# Patient Record
Sex: MALE | Race: WHITE | NOT HISPANIC OR LATINO | Employment: OTHER | URBAN - METROPOLITAN AREA
[De-identification: names, ages, dates, MRNs, and addresses within clinical notes are randomized per-mention and may not be internally consistent; named-entity substitution may affect disease eponyms.]

---

## 2017-02-10 ENCOUNTER — OFFICE VISIT (OUTPATIENT)
Dept: FAMILY MEDICINE | Facility: CLINIC | Age: 72
End: 2017-02-10
Payer: MEDICARE

## 2017-02-10 VITALS
DIASTOLIC BLOOD PRESSURE: 72 MMHG | HEART RATE: 66 BPM | SYSTOLIC BLOOD PRESSURE: 130 MMHG | BODY MASS INDEX: 29.45 KG/M2 | RESPIRATION RATE: 12 BRPM | WEIGHT: 177 LBS | TEMPERATURE: 97.7 F

## 2017-02-10 DIAGNOSIS — E78.5 HYPERLIPIDEMIA LDL GOAL <100: ICD-10-CM

## 2017-02-10 DIAGNOSIS — Z12.11 SCREEN FOR COLON CANCER: ICD-10-CM

## 2017-02-10 DIAGNOSIS — I10 HYPERTENSION GOAL BP (BLOOD PRESSURE) < 140/90: ICD-10-CM

## 2017-02-10 DIAGNOSIS — E11.9 TYPE 2 DIABETES MELLITUS WITHOUT COMPLICATION, WITHOUT LONG-TERM CURRENT USE OF INSULIN (H): Primary | ICD-10-CM

## 2017-02-10 LAB — HBA1C MFR BLD: 6.8 % (ref 4.3–6)

## 2017-02-10 PROCEDURE — 99214 OFFICE O/P EST MOD 30 MIN: CPT | Performed by: FAMILY MEDICINE

## 2017-02-10 PROCEDURE — 83036 HEMOGLOBIN GLYCOSYLATED A1C: CPT | Performed by: FAMILY MEDICINE

## 2017-02-10 PROCEDURE — 80061 LIPID PANEL: CPT | Performed by: FAMILY MEDICINE

## 2017-02-10 PROCEDURE — 36415 COLL VENOUS BLD VENIPUNCTURE: CPT | Performed by: FAMILY MEDICINE

## 2017-02-10 PROCEDURE — 82043 UR ALBUMIN QUANTITATIVE: CPT | Performed by: FAMILY MEDICINE

## 2017-02-10 PROCEDURE — 80053 COMPREHEN METABOLIC PANEL: CPT | Performed by: FAMILY MEDICINE

## 2017-02-10 RX ORDER — AMOXICILLIN 500 MG/1
CAPSULE ORAL
Refills: 0 | COMMUNITY
Start: 2016-07-30 | End: 2019-01-03

## 2017-02-10 NOTE — PROGRESS NOTES
SUBJECTIVE:                                                    Sushant Zepeda is a 71 year old male who presents to clinic today for the following health issues:    Past Medical History   Diagnosis Date     Generalized osteoarthrosis, unspecified site      Abnormal glucose      Unspecified essential hypertension      [place] (hard of hearing), bilateral      Acute right-sided low back pain with right-sided sciatica 6/27/2016       Past Surgical History   Procedure Laterality Date     C appendectomy       Hc remove tonsils/adenoids,<13 y/o       T & A <12y.o.     C total hip arthroplasty       Hip Replacement, Total       Family History   Problem Relation Age of Onset     DIABETES Mother      HEART DISEASE Mother      Eye Disorder Mother      Musculoskeletal Disorder Sister      CEREBROVASCULAR DISEASE Father      Prostate Cancer No family hx of      Cancer - colorectal No family hx of        Social History   Substance Use Topics     Smoking status: Never Smoker      Smokeless tobacco: Never Used     Alcohol Use: 0.0 oz/week     0 Standard drinks or equivalent per week      Comment: occasioanlly       Diabetes Follow-up    Patient is checking blood sugars: once daily.  Results are as follows:         am - 115 to 120    Diabetic concerns: None     Symptoms of hypoglycemia (low blood sugar): none     Paresthesias (numbness or burning in feet) or sores: No     Date of last diabetic eye exam: 6/16       Amount of exercise or physical activity: 2-3 days/week for an average of 15-30 minutes    Problems taking medications regularly: No    Medication side effects: none    Diet: regular (no restrictions)                      Patient Active Problem List   Diagnosis     HYPERLIPIDEMIA LDL GOAL <100     BPH (benign prostatic hyperplasia)     Hypertension goal BP (blood pressure) < 140/90     Advanced directives, counseling/discussion     Health Care Home     Type 2 diabetes mellitus without complication (H)     Acute right-sided  low back pain with right-sided sciatica       PAST MEDICAL HISTORY:                  Past Medical History   Diagnosis Date     Generalized osteoarthrosis, unspecified site      Abnormal glucose      Unspecified essential hypertension      [place] (hard of hearing), bilateral      Acute right-sided low back pain with right-sided sciatica 6/27/2016       PAST SURGICAL HISTORY:                  Past Surgical History   Procedure Laterality Date     C appendectomy       Hc remove tonsils/adenoids,<11 y/o       T & A <12y.o.     C total hip arthroplasty       Hip Replacement, Total       CURRENT MEDICATIONS:                  Current Outpatient Prescriptions   Medication Sig Dispense Refill     gemfibrozil (LOPID) 600 MG tablet Take 1 tablet (600 mg) by mouth 2 times daily 180 tablet 2     lisinopril (PRINIVIL,ZESTRIL) 10 MG tablet Take 1 tablet (10 mg) by mouth daily 90 tablet 2     atorvastatin (LIPITOR) 40 MG tablet Take 1 tablet (40 mg) by mouth daily 90 tablet 3     EPINEPHrine (EPIPEN) 0.3 MG/0.3ML injection Inject 0.3 mLs (0.3 mg) into the muscle once as needed 2 each 1     CO Q-10 100 MG OR CAPS 1 tab QD  0     NIACIN 500 MG OR TBCR two daily 30 0     MULTI-VITAMIN OR TABS 1 tablet daily       GLUCOSAMINE CHONDROITIN OR TABS   0     amoxicillin (AMOXIL) 500 MG capsule TAKE 4 TABLETS BY MOUTH 1 HOUR BEFORE DENTAL APPOINTMENT  0     blood glucose monitoring (NO BRAND SPECIFIED) test strip by In Vitro route 2 times daily. Test as directed, PLEASE DISPENSE PER PT INSURANCE PER PT PREFERENCE 300 each 3     HYDROcodone-acetaminophen (NORCO) 5-325 MG per tablet Take 1 tablet by mouth every 8 hours as needed for moderate to severe pain 40 tablet 0     lidocaine (XYLOCAINE) 5 % ointment Apply topically as needed for moderate pain 50 g 11     predniSONE (DELTASONE) 20 MG tablet Take 3 tabs (60 mg) orally daily for 3 days, 2 tabs (40 mg) orally daily for 3 days, 1 tab (20 mg) orally daily for 3 days, then 1/2 tab (10 mg) orally  for 3 days 20 tablet 0     tiZANidine (ZANAFLEX) 4 MG tablet Take 1 tablet (4 mg) by mouth 3 times daily 90 tablet 0     Blood Glucose Calibration (GLUCOSE CONTROL) solution 1 drop by Test Solution route every 30 days 1 Bottle prn     lancets thin MISC 1 each 2 times daily. PLEASE DISPENSE PER PT INSURANCE PER PT PREFERENCE 100 each 2     Blood Glucose Monitoring Suppl (BLOOD GLUCOSE METER) KIT 1 kit 2 times daily. DISPENSE PER PT INSURANCE PER PT PREFERENCE 1 kit 0     ACCU-CHEK FASTCLIX LANCETS MISC 1 Device 2 times daily. 102 Device 11     ACCU-CHEK SAMANTHA W/DEVICE KIT test twice daily 1 0             FAMILY HISTORY:                   Family History   Problem Relation Age of Onset     DIABETES Mother      HEART DISEASE Mother      Eye Disorder Mother      Musculoskeletal Disorder Sister      CEREBROVASCULAR DISEASE Father      Prostate Cancer No family hx of      Cancer - colorectal No family hx of        HEALTH MAINTENANCE:                    REVIEW OF OUTSIDE RECORDS: NO    REVIEW OF SYSTEMS:  C: NEGATIVE for fever, chills  E: NEGATIVE for vision changes   R: NEGATIVE for significant cough or SOB  CV: NEGATIVE for chest pain, palpitations   GI: NEGATIVE for nausea, abdominal pain, heartburn, or change in bowel habits  : NEGATIVE for frequency, dysuria, or hematuria  M: NEGATIVE for significant arthralgias or myalgia  N: NEGATIVE for weakness, dizziness or paresthesias or headache  NVS:no headache or balance issus  INTEG:no moles or new rashes  LYMPH:no nodes or night sweats    EXAM:    /72 mmHg  Pulse 66  Temp(Src) 97.7  F (36.5  C) (Oral)  Resp 12  Wt 177 lb (80.287 kg)  GENERAL APPEARANCE: healthy, alert and no distress   EXAM:  GENERAL APPEARANCE: healthy, alert and no distress  EYES: EOMI,  PERRL  HENT: ear canals and TM's normal and nose and mouth without ulcers or lesions  RESP: lungs clear to auscultation - no rales, rhonchi or wheezes  CV: regular rates and rhythm, normal S1 S2, no S3 or S4  and no murmur, click or rub -  ABDOMEN:  soft, nontender, no HSM or masses and bowel sounds normal  MS: extremities normal- no gross deformities noted, no evidence of inflammation in joints, FROM in all extremities.  NEURO: Normal strength and tone, sensory exam grossly normal, mentation intact and speech normal  PSYCH: mentation appears normal and affect normal/bright  BACK:no tenderness or pain on straight let raise           ASSESSMENT/PLAN  No diagnosis found.                                 I have discussed with patient the risks, benefits, medications, treatment options and modalities.   I have instructed the patient to call or schedule a follow-up appointment if any problems or failure to improve.                           Problem list and histories reviewed & adjusted, as indicated.  Additional history: as documented        ROS:  Constitutional, HEENT, cardiovascular, pulmonary, GI, , musculoskeletal, neuro, skin, endocrine and psych systems are negative, except as otherwise noted.    OBJECTIVE:                                                    /72 mmHg  Pulse 66  Temp(Src) 97.7  F (36.5  C) (Oral)  Resp 12  Wt 177 lb (80.287 kg)  Body mass index is 29.45 kg/(m^2).  GENERAL: healthy, alert and no distress  EYES: Eyes grossly normal to inspection, PERRL and conjunctivae and sclerae normal  HENT: ear canals and TM's normal, nose and mouth without ulcers or lesions  NECK: no adenopathy, no asymmetry, masses, or scars and thyroid normal to palpation  RESP: lungs clear to auscultation - no rales, rhonchi or wheezes  CV: regular rate and rhythm, normal S1 S2, no S3 or S4, no murmur, click or rub, no peripheral edema and peripheral pulses strong  ABDOMEN: soft, nontender, no hepatosplenomegaly, no masses and bowel sounds normal  MS: no gross musculoskeletal defects noted, no edema  SKIN: no suspicious lesions or rashes  NEURO: Normal strength and tone, mentation intact and speech normal  PSYCH:  mentation appears normal, affect normal/bright         ASSESSMENT/PLAN:                                                      (E11.22,  N18.4) 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 disease, without long-term current use of insulin (H)  (primary encounter diagnosis)  Comment: doing well  Plan: Comprehensive metabolic panel, Lipid Profile         with reflex to direct LDL, PSA, screen,         Hemoglobin A1c            (I10) Hypertension goal BP (blood pressure) < 140/90  Comment:   Plan: at [facility]    (E78.5) Hyperlipidemia LDL goal <100  Comment:   Plan: Lipid Profile with reflex to direct LDL        At goal         Yfn Baum MD  [facility]  Answers for HPI/ROS submitted by the patient on 2/7/2017   Chronic problems general questions HPI Form  Frequency of checking blood sugars:: 1 time a day  Diabetic concerns:: None  Hypoglycemia symptoms:: None  Paraesthesia present:: No  Eye Exam in the last year:: Yes  Date of last Diabetic Eye Exam:: June 2016

## 2017-02-10 NOTE — NURSING NOTE
"Chief Complaint   Patient presents with     Diabetes       Initial /72 mmHg  Pulse 66  Temp(Src) 97.7  F (36.5  C) (Oral)  Resp 12  Wt 177 lb (80.287 kg) Estimated body mass index is 29.45 kg/(m^2) as calculated from the following:    Height as of 8/23/16: 5' 5\" (1.651 m).    Weight as of this encounter: 177 lb (80.287 kg).  Medication Reconciliation: complete   Andry Andrews CMA       "

## 2017-02-11 LAB
ALBUMIN SERPL-MCNC: 4.5 G/DL (ref 3.4–5)
ALP SERPL-CCNC: 101 U/L (ref 40–150)
ALT SERPL W P-5'-P-CCNC: 20 U/L (ref 0–70)
ANION GAP SERPL CALCULATED.3IONS-SCNC: 5 MMOL/L (ref 3–14)
AST SERPL W P-5'-P-CCNC: 13 U/L (ref 0–45)
BILIRUB SERPL-MCNC: 0.6 MG/DL (ref 0.2–1.3)
BUN SERPL-MCNC: 16 MG/DL (ref 7–30)
CALCIUM SERPL-MCNC: 9.2 MG/DL (ref 8.5–10.1)
CHLORIDE SERPL-SCNC: 105 MMOL/L (ref 94–109)
CHOLEST SERPL-MCNC: 147 MG/DL
CO2 SERPL-SCNC: 28 MMOL/L (ref 20–32)
CREAT SERPL-MCNC: 1.06 MG/DL (ref 0.66–1.25)
CREAT UR-MCNC: 76 MG/DL
GFR SERPL CREATININE-BSD FRML MDRD: 69 ML/MIN/1.7M2
GLUCOSE SERPL-MCNC: 129 MG/DL (ref 70–99)
HDLC SERPL-MCNC: 52 MG/DL
LDLC SERPL CALC-MCNC: 69 MG/DL
MICROALBUMIN UR-MCNC: 6 MG/L
MICROALBUMIN/CREAT UR: 8.34 MG/G CR (ref 0–17)
NONHDLC SERPL-MCNC: 95 MG/DL
POTASSIUM SERPL-SCNC: 4.3 MMOL/L (ref 3.4–5.3)
PROT SERPL-MCNC: 8 G/DL (ref 6.8–8.8)
SODIUM SERPL-SCNC: 138 MMOL/L (ref 133–144)
TRIGL SERPL-MCNC: 131 MG/DL

## 2017-03-02 ENCOUNTER — HOSPITAL ENCOUNTER (OUTPATIENT)
Facility: CLINIC | Age: 72
Discharge: HOME | End: 2017-03-02
Attending: INTERNAL MEDICINE | Admitting: INTERNAL MEDICINE
Payer: MEDICARE

## 2017-03-02 VITALS
DIASTOLIC BLOOD PRESSURE: 67 MMHG | RESPIRATION RATE: 12 BRPM | OXYGEN SATURATION: 96 % | SYSTOLIC BLOOD PRESSURE: 128 MMHG

## 2017-03-02 LAB — COLONOSCOPY: NORMAL

## 2017-03-02 PROCEDURE — 45385 COLONOSCOPY W/LESION REMOVAL: CPT | Mod: PT | Performed by: INTERNAL MEDICINE

## 2017-03-02 PROCEDURE — 25000125 ZZHC RX 250: Performed by: INTERNAL MEDICINE

## 2017-03-02 PROCEDURE — G0500 MOD SEDAT ENDO SERVICE >5YRS: HCPCS | Performed by: INTERNAL MEDICINE

## 2017-03-02 PROCEDURE — 88305 TISSUE EXAM BY PATHOLOGIST: CPT | Performed by: INTERNAL MEDICINE

## 2017-03-02 PROCEDURE — 88305 TISSUE EXAM BY PATHOLOGIST: CPT | Mod: 26 | Performed by: INTERNAL MEDICINE

## 2017-03-02 PROCEDURE — 45380 COLONOSCOPY AND BIOPSY: CPT | Mod: PT,XU | Performed by: INTERNAL MEDICINE

## 2017-03-02 PROCEDURE — 25000128 H RX IP 250 OP 636: Performed by: INTERNAL MEDICINE

## 2017-03-02 RX ORDER — FENTANYL CITRATE 50 UG/ML
INJECTION, SOLUTION INTRAMUSCULAR; INTRAVENOUS PRN
Status: DISCONTINUED | OUTPATIENT
Start: 2017-03-02 | End: 2017-03-02 | Stop reason: HOSPADM

## 2017-03-02 RX ORDER — ONDANSETRON 4 MG/1
4 TABLET, ORALLY DISINTEGRATING ORAL EVERY 6 HOURS PRN
Status: DISCONTINUED | OUTPATIENT
Start: 2017-03-02 | End: 2017-03-02 | Stop reason: HOSPADM

## 2017-03-02 RX ORDER — ONDANSETRON 2 MG/ML
4 INJECTION INTRAMUSCULAR; INTRAVENOUS EVERY 6 HOURS PRN
Status: DISCONTINUED | OUTPATIENT
Start: 2017-03-02 | End: 2017-03-02 | Stop reason: HOSPADM

## 2017-03-02 RX ORDER — NALOXONE HYDROCHLORIDE 0.4 MG/ML
.1-.4 INJECTION, SOLUTION INTRAMUSCULAR; INTRAVENOUS; SUBCUTANEOUS
Status: DISCONTINUED | OUTPATIENT
Start: 2017-03-02 | End: 2017-03-02 | Stop reason: HOSPADM

## 2017-03-02 RX ORDER — LIDOCAINE 40 MG/G
CREAM TOPICAL
Status: DISCONTINUED | OUTPATIENT
Start: 2017-03-02 | End: 2017-03-02 | Stop reason: HOSPADM

## 2017-03-02 RX ORDER — FLUMAZENIL 0.1 MG/ML
0.2 INJECTION, SOLUTION INTRAVENOUS
Status: DISCONTINUED | OUTPATIENT
Start: 2017-03-02 | End: 2017-03-02 | Stop reason: HOSPADM

## 2017-03-02 RX ORDER — ONDANSETRON 2 MG/ML
4 INJECTION INTRAMUSCULAR; INTRAVENOUS
Status: DISCONTINUED | OUTPATIENT
Start: 2017-03-02 | End: 2017-03-02 | Stop reason: HOSPADM

## 2017-03-02 NOTE — IP AVS SNAPSHOT
MRN:[id number]                      After Visit Summary   3/2/2017    Sushant Zepeda    MRN: [id number]           Thank you!     Thank you for choosing [facility] for your care. Our goal is always to provide you with excellent care. Hearing back from our patients is one way we can continue to improve our services. Please take a few minutes to complete the written survey that you may receive in the mail after you visit. If you would like to speak to someone directly about your visit please contact Patient Relations at [phone]. Thank you!          Patient Information     Date Of Birth          1945        About your hospital stay     You were admitted on:  March 2, 2017 You last received care in the:  [facility]    You were discharged on:  March 2, 2017       Who to Call     For medical emergencies, please call 911.  For non-urgent questions about your medical care, please call your primary care provider or clinic, [phone]  For questions related to your surgery, please call your surgery clinic        Attending Provider     Provider Specialty    Ru Deal MD Gastroenterology       Primary Care Provider Office Phone # Fax #    Yfn David Baum [phone]       [address] [address]        Further instructions from your care team         Understanding Colon and Rectal Polyps     The colon has a smooth lining composed of millions of cells.     The colon (also called the large intestine) is a muscular tube that forms the last part of the digestive tract. It absorbs water and stores food waste. The colon is about 4 to 6 feet long. The rectum is the last 6 inches of the colon. The colon and rectum have a smooth lining composed of millions of cells. Changes in these cells can lead to growths in the colon that can become cancerous and should be removed.     When the Colon Lining  Changes  Changes that occur in the cells that line the colon or rectum can lead to growths called polyps. Over a period of years, polyps can turn cancerous. Removing polyps early may prevent cancer from ever forming.      Polyps  Polyps are fleshy clumps of tissue that form on the lining of the colon or rectum. Small polyps are usually benign (not cancerous). However, over time, cells in a polyp can change and become cancerous. The larger a polyp grows, the more likely this is to happen. Also, certain types of polyps known as adenomatous polyps are considered premalignant. This means that they will almost always become cancerous if they re not removed.          Cancer  Almost all colorectal cancers start when polyp cells begin growing abnormally. As a cancerous tumor grows, it may involve more and more of the colon or rectum. In time, cancer can also grow beyond the colon or rectum and spread to nearby organs or to glands called lymph nodes. The cells can also travel to other parts of the body. This is known as metastasis. The earlier a cancerous tumor is removed, the better the chance of preventing its spread.        [id number] [place] [address]. All rights reserved. This information is not intended as a substitute for professional medical care. Always follow your healthcare professional's instructions.    Pending Results     Date and Time Order Name Status Description    3/2/2017 1002 Surgical pathology exam In process             Admission Information     Date & Time Provider Department Dept. Phone    3/2/2017 Ru Deal MD [facility] [phone]      Your Vitals Were     Blood Pressure Respirations Pulse Oximetry             123/76 12 96%         MyChart Information     [facility] gives you secure access to your electronic health record. If you see a primary care provider, you can also send messages to your care team and make appointments. If you  have questions, please call your primary care clinic.  If you do not have a primary care provider, please call [phone] and they will assist you.        Care EveryWhere ID     This is your Care EveryWhere ID. This could be used by other organizations to access your [place] medical records  [id number]           Review of your medicines      CONTINUE these medicines which have NOT CHANGED        Dose / Directions    * ACCU-CHEK SAMANTHA W/DEVICE Kit   Used for:  Type II or unspecified type diabetes mellitus without mention of complication, not stated as uncontrolled        test twice daily   Quantity:  1   Refills:  0       * blood glucose monitoring meter device kit   Commonly known as:  no brand specified   Used for:  Type II or unspecified type diabetes mellitus without mention of complication, not stated as uncontrolled        Dose:  1 kit   1 kit 2 times daily. DISPENSE PER PT INSURANCE PER PT PREFERENCE   Quantity:  1 kit   Refills:  0       amoxicillin 500 MG capsule   Commonly known as:  AMOXIL   Used for:  Type 2 diabetes mellitus without complication, without long-term current use of insulin (H)        TAKE 4 TABLETS BY MOUTH 1 HOUR BEFORE DENTAL APPOINTMENT   Refills:  0       atorvastatin 40 MG tablet   Commonly known as:  LIPITOR   Used for:  Hyperlipidemia LDL goal <100        Dose:  40 mg   Take 1 tablet (40 mg) by mouth daily   Quantity:  90 tablet   Refills:  3       blood glucose calibration solution   Commonly known as:  no brand specified   Used for:  Type 2 diabetes, HbA1C goal < 8% (H)        Dose:  1 drop   1 drop by Test Solution route every 30 days   Quantity:  1 Bottle   Refills:  prn       * blood glucose monitoring lancets   Used for:  Type II or unspecified type diabetes mellitus without mention of complication, not stated as uncontrolled        Dose:  1 Device   1 Device 2 times daily.   Quantity:  102 Device   Refills:  11       * thin lancets   Commonly known as:  NO BRAND SPECIFIED    Used for:  Type II or unspecified type diabetes mellitus without mention of complication, not stated as uncontrolled        Dose:  1 each   1 each 2 times daily. PLEASE DISPENSE PER PT INSURANCE PER PT PREFERENCE   Quantity:  100 each   Refills:  2       blood glucose monitoring test strip   Commonly known as:  no brand specified   Used for:  Type 2 diabetes, HbA1C goal < 8% (H)        by In Vitro route 2 times daily. Test as directed, PLEASE DISPENSE PER PT INSURANCE PER PT PREFERENCE   Quantity:  300 each   Refills:  3       coenzyme Q-10 100 MG Caps        1 tab QD   Refills:  0       EPINEPHrine 0.3 MG/0.3ML injection   Used for:  Allergic state, sequela        Dose:  0.3 mg   Inject 0.3 mLs (0.3 mg) into the muscle once as needed   Quantity:  2 each   Refills:  1       gemfibrozil 600 MG tablet   Commonly known as:  LOPID   Used for:  Type 2 diabetes, HbA1C goal < 8% (H), Hypertension goal BP (blood pressure) < 140/90        Dose:  600 mg   Take 1 tablet (600 mg) by mouth 2 times daily   Quantity:  180 tablet   Refills:  2       GLUCOSAMINE CHONDROITIN Tabs        Refills:  0       HYDROcodone-acetaminophen 5-325 MG per tablet   Commonly known as:  NORCO   Used for:  Acute right-sided low back pain with right-sided sciatica        Dose:  1 tablet   Take 1 tablet by mouth every 8 hours as needed for moderate to severe pain   Quantity:  40 tablet   Refills:  0       lidocaine 5 % ointment   Commonly known as:  XYLOCAINE   Used for:  Acute pain of right knee        Apply topically as needed for moderate pain   Quantity:  50 g   Refills:  11       lisinopril 10 MG tablet   Commonly known as:  PRINIVIL/ZESTRIL   Used for:  Essential hypertension, benign        Dose:  10 mg   Take 1 tablet (10 mg) by mouth daily   Quantity:  90 tablet   Refills:  2       Multi-vitamin Tabs tablet   Generic drug:  multivitamin, therapeutic with minerals        1 tablet daily   Refills:  0       Niacin 500 MG Tbcr        two daily    Quantity:  30   Refills:  0       predniSONE 20 MG tablet   Commonly known as:  DELTASONE   Used for:  Acute right-sided low back pain with right-sided sciatica        Take 3 tabs (60 mg) orally daily for 3 days, 2 tabs (40 mg) orally daily for 3 days, 1 tab (20 mg) orally daily for 3 days, then 1/2 tab (10 mg) orally for 3 days   Quantity:  20 tablet   Refills:  0       tiZANidine 4 MG tablet   Commonly known as:  ZANAFLEX   Used for:  Acute right-sided low back pain with right-sided sciatica        Dose:  4 mg   Take 1 tablet (4 mg) by mouth 3 times daily   Quantity:  90 tablet   Refills:  0       * Notice:  This list has 4 medication(s) that are the same as other medications prescribed for you. Read the directions carefully, and ask your doctor or other care provider to review them with you.             Protect others around you: Learn how to safely use, store and throw away your medicines at www.disposemymeds.org.             Medication List: This is a list of all your medications and when to take them. Check marks below indicate your daily home schedule. Keep this list as a reference.      Medications           Morning Afternoon Evening Bedtime As Needed    * ACCU-CHEK SAMANTHA W/DEVICE Kit   test twice daily                                * blood glucose monitoring meter device kit   Commonly known as:  no brand specified   1 kit 2 times daily. DISPENSE PER PT INSURANCE PER PT PREFERENCE                                amoxicillin 500 MG capsule   Commonly known as:  AMOXIL   TAKE 4 TABLETS BY MOUTH 1 HOUR BEFORE DENTAL APPOINTMENT                                atorvastatin 40 MG tablet   Commonly known as:  LIPITOR   Take 1 tablet (40 mg) by mouth daily                                blood glucose calibration solution   Commonly known as:  no brand specified   1 drop by Test Solution route every 30 days                                * blood glucose monitoring lancets   1 Device 2 times daily.                                 * thin lancets   Commonly known as:  NO BRAND SPECIFIED   1 each 2 times daily. PLEASE DISPENSE PER PT INSURANCE PER PT PREFERENCE                                blood glucose monitoring test strip   Commonly known as:  no brand specified   by In Vitro route 2 times daily. Test as directed, PLEASE DISPENSE PER PT INSURANCE PER PT PREFERENCE                                coenzyme Q-10 100 MG Caps   1 tab QD                                EPINEPHrine 0.3 MG/0.3ML injection   Inject 0.3 mLs (0.3 mg) into the muscle once as needed                                gemfibrozil 600 MG tablet   Commonly known as:  LOPID   Take 1 tablet (600 mg) by mouth 2 times daily                                GLUCOSAMINE CHONDROITIN Tabs                                HYDROcodone-acetaminophen 5-325 MG per tablet   Commonly known as:  NORCO   Take 1 tablet by mouth every 8 hours as needed for moderate to severe pain                                lidocaine 5 % ointment   Commonly known as:  XYLOCAINE   Apply topically as needed for moderate pain                                lisinopril 10 MG tablet   Commonly known as:  PRINIVIL/ZESTRIL   Take 1 tablet (10 mg) by mouth daily                                Multi-vitamin Tabs tablet   1 tablet daily   Generic drug:  multivitamin, therapeutic with minerals                                Niacin 500 MG Tbcr   two daily                                predniSONE 20 MG tablet   Commonly known as:  DELTASONE   Take 3 tabs (60 mg) orally daily for 3 days, 2 tabs (40 mg) orally daily for 3 days, 1 tab (20 mg) orally daily for 3 days, then 1/2 tab (10 mg) orally for 3 days                                tiZANidine 4 MG tablet   Commonly known as:  ZANAFLEX   Take 1 tablet (4 mg) by mouth 3 times daily                                * Notice:  This list has 4 medication(s) that are the same as other medications prescribed for you. Read the directions carefully, and ask your doctor or  other care provider to review them with you.

## 2017-03-02 NOTE — PROCEDURES
Pre-Endoscopy History and Physical     Sushant Zepeda MRN# [id number]   YOB: 1945 Age: 71 year old     Date of Procedure: 3/2/2017  Primary care provider: Yfn Baum  Type of Endoscopy: colonoscopy  Reason for Procedure: F/U polyps  Type of Anesthesia Anticipated: Moderate (conscious) sedation    HPI:    Sushant is a 71 year old male who will be undergoing the above procedure.      A history and physical has been performed. The patient's medications and allergies have been reviewed. The risks and benefits of the procedure and the sedation options and risks were discussed with the patient.  All questions were answered and informed consent was obtained.      Allergies   Allergen Reactions     Nsaids      aleve, ibuprofen     hives and swelling        Current Facility-Administered Medications   Medication     lidocaine 1 % 1 mL     lidocaine (LMX4) kit     sodium chloride (PF) 0.9% PF flush 3 mL     sodium chloride (PF) 0.9% PF flush 3 mL     0.9% sodium chloride BOLUS     sodium chloride (PF) 0.9% PF flush 3 mL     ondansetron (ZOFRAN) injection 4 mg       Patient Active Problem List   Diagnosis     HYPERLIPIDEMIA LDL GOAL <100     BPH (benign prostatic hyperplasia)     Hypertension goal BP (blood pressure) < 140/90     Advanced directives, counseling/discussion     Health Care Home     Type 2 diabetes mellitus without complication (H)     Acute right-sided low back pain with right-sided sciatica        Past Medical History   Diagnosis Date     Abnormal glucose      Acute right-sided low back pain with right-sided sciatica 6/27/2016     Generalized osteoarthrosis, unspecified site      Lower [place] (hard of hearing), bilateral      Unspecified essential hypertension         Past Surgical History   Procedure Laterality Date     C appendectomy       Hc remove tonsils/adenoids,<11 y/o       T & A <12y.o.     C total hip arthroplasty       Hip Replacement, Total       Social History   Substance Use  Topics     Smoking status: Never Smoker     Smokeless tobacco: Never Used     Alcohol use 0.0 oz/week     0 Standard drinks or equivalent per week      Comment: occasioanlly       Family History   Problem Relation Age of Onset     DIABETES Mother      HEART DISEASE Mother      Eye Disorder Mother      CEREBROVASCULAR DISEASE Father      Musculoskeletal Disorder Sister      Prostate Cancer No family hx of      Cancer - colorectal No family hx of             Medications:     Prescriptions Prior to Admission   Medication Sig Dispense Refill Last Dose     gemfibrozil (LOPID) 600 MG tablet Take 1 tablet (600 mg) by mouth 2 times daily 180 tablet 2 3/2/2017     lisinopril (PRINIVIL,ZESTRIL) 10 MG tablet Take 1 tablet (10 mg) by mouth daily 90 tablet 2 3/1/2017     atorvastatin (LIPITOR) 40 MG tablet Take 1 tablet (40 mg) by mouth daily 90 tablet 3 3/1/2017     CO Q-10 100 MG OR CAPS 1 tab QD  0 Past Week     NIACIN 500 MG OR TBCR two daily 30 0 3/2/2017     MULTI-VITAMIN OR TABS 1 tablet daily   Past Week     GLUCOSAMINE CHONDROITIN OR TABS   0 3/1/2017     amoxicillin (AMOXIL) 500 MG capsule TAKE 4 TABLETS BY MOUTH 1 HOUR BEFORE DENTAL APPOINTMENT  0      blood glucose monitoring (NO BRAND SPECIFIED) test strip by In Vitro route 2 times daily. Test as directed, PLEASE DISPENSE PER PT INSURANCE PER PT PREFERENCE 300 each 3 Unknown     HYDROcodone-acetaminophen (NORCO) 5-325 MG per tablet Take 1 tablet by mouth every 8 hours as needed for moderate to severe pain 40 tablet 0 More than a month     lidocaine (XYLOCAINE) 5 % ointment Apply topically as needed for moderate pain 50 g 11 Unknown     predniSONE (DELTASONE) 20 MG tablet Take 3 tabs (60 mg) orally daily for 3 days, 2 tabs (40 mg) orally daily for 3 days, 1 tab (20 mg) orally daily for 3 days, then 1/2 tab (10 mg) orally for 3 days 20 tablet 0 More than a month     tiZANidine (ZANAFLEX) 4 MG tablet Take 1 tablet (4 mg) by mouth 3 times daily 90 tablet 0 More than a  "month     EPINEPHrine (EPIPEN) 0.3 MG/0.3ML injection Inject 0.3 mLs (0.3 mg) into the muscle once as needed 2 each 1 Taking     Blood Glucose Calibration (GLUCOSE CONTROL) solution 1 drop by Test Solution route every 30 days 1 Bottle prn Unknown     lancets thin MISC 1 each 2 times daily. PLEASE DISPENSE PER PT INSURANCE PER PT PREFERENCE 100 each 2 Unknown     Blood Glucose Monitoring Suppl (BLOOD GLUCOSE METER) KIT 1 kit 2 times daily. DISPENSE PER PT INSURANCE PER PT PREFERENCE 1 kit 0 Unknown     ACCU-CHEK FASTCLIX LANCETS MISC 1 Device 2 times daily. 102 Device 11 Unknown     ACCU-CHEK SAMANTHA W/DEVICE KIT test twice daily 1 0 Unknown       Scheduled Medications:    sodium chloride (PF)  3 mL Intracatheter Q8H     sodium chloride 0.9%  500 mL Intravenous Once       PRN:  lidocaine, lidocaine 4%, sodium chloride (PF), sodium chloride (PF), ondansetron    PHYSICAL EXAM:   There were no vitals taken for this visit. Estimated body mass index is 29.45 kg/(m^2) as calculated from the following:    Height as of 8/23/16: 1.651 m (5' 5\").    Weight as of 2/10/17: 80.3 kg (177 lb).   RESP: lungs clear to auscultation - no rales, rhonchi or wheezes  CV: regular rates and rhythm    IMPRESSION   ASA Class 2 - Mild systemic disease      Signed Electronically by: Ru Deal MD  March 2, 2017    .            "

## 2017-03-02 NOTE — LETTER
February 13, 2017      Sushant Zepeda  [id number] [address] [id number]              Dear Sushant Zepeda,    Thank you for choosing [facility]. You are scheduled for the following service(s).   Miralax Prep    Procedure:   Colonoscopy   Provider:         Dr. Ru Deal  Date:   March 2nd, 2017 - Thursday                Arrival Time:   9:00 am  *check in at Emergency/Endoscopy desk*  Procedure Time:     9:30 am     Location:   [facility]      Endoscopy Department, First Floor (Enter through ER Doors) *       [address] [place], [place] [address]    [phone] or [phone] () to reschedule   What is a colonoscopy?  A colonoscopy is the most accurate test to detect colon polyps and colon cancer, and the only test where polyps can be removed. During this procedure, a doctor examines the lining of your large intestine and rectum through a flexible tube called a colonoscope.   To produce the best and most accurate results, your colon must be completely clean. You will drink a special bowel cleansing preparation to help clean out your colon. You will also need to follow a special diet several days prior to your scheduled colonoscopy.  What happens during a colonoscopy?  Plan to spend up to two hours, starting at registration time, at the endoscopy center the day of your procedure. The exam itself takes approximately 15 minutes to complete, and recovery is approximately 30 minutes.     Before the exam:    You will change into a gown.    Your medical history will be reviewed with you and you will be given a consent form to sign.     A nurse will insert an intravenous (IV) line into your hand or arm.  During the exam:     Medicine will be given through the IV line to help you relax and feel drowsy.     Your heart rate and oxygen levels will be monitored. If your blood pressure is low, you may be given fluids through the IV line.      The doctor will insert a flexible hollow tube, called a colonoscope, into your rectum.   The scope will be advanced slowly through the large intestine (colon).    You may have a feeling of pressure or fullness.     If an abnormal tissue, or a polyp are found, the doctor may remove it through the endoscope for closer examination, or biopsy. Tissue removal is painless.  What happens after the exam?           The doctor will talk with you about the initial results of your exam.     The doctor will prepare a full report for the physician who referred you for the colonoscopy.     You may feel bloated after the procedure. This is normal.     Medication given during the exam will prohibit you from driving for the rest of the day.     Following the exam, you may resume your normal diet. Avoid alcohol until the next day.     You may resume your regular activities the day after the procedure.     A nurse will provide you with complete discharge instructions before you leave the endoscopy center. Be sure to ask the nurse for specific instructions if you take blood thinners such as aspirin, Coumadin or Plavix.     Any tissue samples removed during the exam will be sent to a lab for evaluation. It may take 5-7 working days for you to be notified of the results.     Miralax-Gatorade  If you have diabetes, ask your regular doctor for diet and medication restrictions.   If you take a medication to thin your blood, such as coumadin or warfarin, please call your primary care provider for directions on when to stop this medication.  If you take aspirin, you may continue to do so.  If you are or may be pregnant, please discuss the risks and benefits of this procedure with your doctor.  You must arrange for a ride for the day of your exam. If you fail to arrange transportation with a responsible adult (someone you know and trust) your procedure will need to be cancelled and rescheduled.  If you must cancel or reschedule your  appointment, please call [phone] as soon as possible.        PREPARATION  To ensure a successful exam, please follow all instructions carefully. Failure to accurately and completely prepare for your exam may result in the need for an additional procedure and both procedures will be billed to your insurance.     Purchase the following over-the-counter supplies at your local pharmacy:      ? 2 tablets bisacodyl, each containing 5 mg of bisacodyl (Dulcolax  laxative NOT Dulcolax  stool softener)   ? 1-8.3 oz. bottle Miralax  powder (238 grams)   ? 64 oz. Gatorade  liquid (NOT red; NOT powdered). Regular Gatorade , Gatorade G2 , Powerade  or  PoweradeZero  are acceptable.   ? 1-10 oz. bottle Magnesium Citrate (NOT red)  7 days before your exam:  Discontinue fiber supplements or medications containing iron. This includes multivitamins with iron, Metamucil  and Fibercon .    3 Days prior to your exam:  Stop eating all high-fiber foods and begin a Low-Fiber Diet. A low fiber diet helps make the cleanout more effective.     Examples of a Low Fiber Diet include:     White bread, white rice, pasta, potato without skin, plain crackers     Fish, white meat chicken, eggs, peanut butter without nuts     Clear beverages (apple juice, white grape juice, Sprite , sparkling water, Gatorade )     Cooked carrots, cooked green beans, cooked spinach        Milk, plain yogurt, cheese     Jelly, salt, pepper, sugar  Avoid: Raw fruits or vegetables, whole wheat or high fiber foods, seeds, nuts, popcorn, bran or bulking agents     2 days prior to your exam     Continue Low Fiber Diet.     Drink at least 8 (eight ounces) glasses of water throughout the day.     Refrigerate the Gatorade  or Powerade , if you wish to drink it cold.     Stop eating solid foods at 11:45 pm.    1 day prior to your exam  Start a Clear Liquid Diet   Examples of a Clear Liquid Diet:     No red liquids; No coffee; No alcohol; No dairy products     May drink clear  caffeinated beverages    Water: drink at least 8 glasses of water during the day     Tea (do not add milk or creamer)     Clear broth or bouillon     Gatorade , Pedialyte  or Powerade  (No red)     Carbonated and non-carbonated soft drinks (Sprite , 7-Up , Ginger ale)     Strained fruit juices without pulp (apple, white grape, white cranberry)     Jell-O , popsicles, hard candy (No red)    At 12 Noon:  Take the 2 bisacodyl (Dulcolax ) tablets    At 4 PM (no later than 6 PM)    Mix 1 bottle of Miralax  (8.3 oz) with 64 oz. of Gatorade  in a large pitcher.     Drink 1 - 8 oz. glass of the Miralax /Gatorade  solution.     Continue drinking 1 - 8 oz. glass every 15 minutes thereafter until the mixture is gone.     Continue clear liquid diet     Colon Cleansing Tips     If you experience nausea or vomiting while taking the prep, rinse your mouth with water,  or mouthwash , take a 15-30 minute break, and then continue taking the prep solution. If you are still unable to complete the prep, without severe nausea or vomiting, you will need to contact your primary care provider (the provider who ordered the test) for a possible anti-nausea medication. Or if you are prone to nausea you may want to ask your primary care provider to prescribe an as needed anti-nausea medication that you may have on hand.    Chill the Miralax /Gatorade  solution in the refrigerator. DO NOT add ice to the solution or your drinking glass.      Set a timer for every 15 minutes. Drink each 8 - oz. glass of solution quickly to help flush your colon.     Stay near a toilet! You will have diarrhea.     Even if you are sitting on the toilet, continue to drink the cleansing solution every 15 minutes.     Drink all of the solution until it is gone.     You will be uncomfortable until the stool has flushed from your colon (in about 2-4 hours). You may feel chilled.     You may suck on a few hard candies (NO red).     Alcohol-free baby wipes or Vaseline  may  help ease skin irritation.     Over-the-counter hydrocortisone creams, hemorrhoid treatments or Tucks may be used if desired.    The day of your exam:            Continue clear liquid diet. Do not eat solid foods.     You may take all of your morning medications.    4 hours before your procedure:     Drink 10 oz. of Magnesium Citrate.    2 hours before your procedure:     Stop drinking clear liquids.     Allow extra time to travel to your procedure as you may need to stop and use a restroom along the way.  You are ready for the exam, if you followed all instructions and your stool is no longer formed, but clear or yellow liquid. If you are unsure whether your colon is clean, please call our department at [phone] before you leave for your appointment.      Bring a list of all of your current medications, including any allergy or over-the-counter medications.      Bring a photo ID, as well as up-to-date insurance information, such as your insurance card and any referral forms that might be required by your insurance company.  DIRECTIONS  From the north ([facility], [place])  Take [address] south, exit to [address] [address]. Get into the left hand jani, turn left (east), go one-half mile to [address]. Turn left (north) on [address]. Go north to first stoplight, take a right on the newly [address] and follow it to the Emergency entrance.  From the south ([facility])  Take 35 north to the east split, [address], and exit to [address] [address]. Turn left (west) on [address] [address] to [address]. Turn right (north) on [address]. Go north to first stoplight, take a right on the newly [address] and follow it to the Emergency entrance.    From the east via 35[address] ([facility])  Take [address] south to [address] [address] exit. Turn right on [address] [address]. Go west to [address]. Turn right (north) on [address], go to the first stoplight, take a  right and follow the newly constructed road, [email or web address], to the Emergency entrance.    From the east via [address] ([facility])  Take [address] west to [address]. Turn left (south) on [address] to [email or web address]. Turn left (east) on [email or web address] and follow the newly constructed road to the Emergency entrance.    From the west via [address] ([facility])  Take [address] east to [address]. Turn right (south) on [address] to [email or web address].  Turn left (east) on [email or web address] and follow the newly constructed road to the Emergency entrance.  Cut along line  -------------------------------------------------------------------------------------------------------------------  SHOPPING LIST FOR OVER-THE-COUNTER COLONOSCOPY PREP:  ? 2 tablets bisacodyl, each containing 5 mg of bisacodyl (Dulcolax  laxative                     NOT Dulcolax  stool softener)   ? 1-8.3 oz. bottle Miralax  powder (238 grams)   ? 64 oz. Gatorade  liquid (NOT red; NOT powdered). Regular Gatorade ,                     Gatorade G2 , Powerade  or  PoweradeZero  are acceptable.   ? 1-10 oz. bottle Magnesium Citrate (NOT red)

## 2017-03-02 NOTE — DISCHARGE INSTRUCTIONS
Understanding Colon and Rectal Polyps     The colon has a smooth lining composed of millions of cells.     The colon (also called the large intestine) is a muscular tube that forms the last part of the digestive tract. It absorbs water and stores food waste. The colon is about 4 to 6 feet long. The rectum is the last 6 inches of the colon. The colon and rectum have a smooth lining composed of millions of cells. Changes in these cells can lead to growths in the colon that can become cancerous and should be removed.     When the Colon Lining Changes  Changes that occur in the cells that line the colon or rectum can lead to growths called polyps. Over a period of years, polyps can turn cancerous. Removing polyps early may prevent cancer from ever forming.      Polyps  Polyps are fleshy clumps of tissue that form on the lining of the colon or rectum. Small polyps are usually benign (not cancerous). However, over time, cells in a polyp can change and become cancerous. The larger a polyp grows, the more likely this is to happen. Also, certain types of polyps known as adenomatous polyps are considered premalignant. This means that they will almost always become cancerous if they re not removed.          Cancer  Almost all colorectal cancers start when polyp cells begin growing abnormally. As a cancerous tumor grows, it may involve more and more of the colon or rectum. In time, cancer can also grow beyond the colon or rectum and spread to nearby organs or to glands called lymph nodes. The cells can also travel to other parts of the body. This is known as metastasis. The earlier a cancerous tumor is removed, the better the chance of preventing its spread.        [id number] [facility], [address] [place] [address], [place] [address]. All rights reserved. This information is not intended as a substitute for professional medical care. Always follow your healthcare professional's instructions.

## 2017-03-03 LAB — COPATH REPORT: NORMAL

## 2017-04-07 DIAGNOSIS — E78.5 HYPERLIPIDEMIA LDL GOAL <100: ICD-10-CM

## 2017-04-07 RX ORDER — ATORVASTATIN CALCIUM 40 MG/1
TABLET, FILM COATED ORAL
Qty: 90 TABLET | Refills: 3 | Status: SHIPPED | OUTPATIENT
Start: 2017-04-07 | End: 2018-04-01

## 2017-04-07 NOTE — TELEPHONE ENCOUNTER
atorvastatin (LIPITOR) 40 MG tablet     Last Written Prescription Date: 4/7/16  Last Fill Quantity: 90, # refills: 3  Last Office Visit with [facility] or [facility] prescribing provider: 2/10/2017         Lab Results   Component Value Date    CHOL 147 02/10/2017     Lab Results   Component Value Date    HDL 52 02/10/2017     Lab Results   Component Value Date    LDL 69 02/10/2017     Lab Results   Component Value Date    TRIG 131 02/10/2017     Lab Results   Component Value Date    CHOLHDLRATIO 2.6 08/21/2015       JERMAINE Adair  April 7, 2017  8:41 AM

## 2017-05-11 ENCOUNTER — TRANSFERRED RECORDS (OUTPATIENT)
Dept: HEALTH INFORMATION MANAGEMENT | Facility: CLINIC | Age: 72
End: 2017-05-11

## 2017-05-26 DIAGNOSIS — I10 ESSENTIAL HYPERTENSION, BENIGN: ICD-10-CM

## 2017-05-26 RX ORDER — LISINOPRIL 10 MG/1
TABLET ORAL
Qty: 90 TABLET | Refills: 0 | Status: SHIPPED | OUTPATIENT
Start: 2017-05-26 | End: 2017-06-14 | Stop reason: DRUGHIGH

## 2017-05-26 NOTE — TELEPHONE ENCOUNTER
lisinopril (PRINIVIL,ZESTRIL) 10 MG tablet      Last Written Prescription Date:9/7/16   Last Fill Quantity: 90  # refills:2  Last Office Visit with [facility], [facility] or [facility] prescribing provider: Sarika 2/10/17       Potassium   Date Value Ref Range Status   02/10/2017 4.3 3.4 - 5.3 mmol/L Final     Creatinine   Date Value Ref Range Status   02/10/2017 1.06 0.66 - 1.25 mg/dL Final     BP Readings from Last 3 Encounters:   03/02/17 128/67   02/10/17 130/72   09/30/16 122/62

## 2017-05-26 NOTE — TELEPHONE ENCOUNTER
Prescription approved per [facility] Refill Protocol.  For 1 X fill pt due for f/u     Rand Johnson RN

## 2017-06-01 DIAGNOSIS — I10 HYPERTENSION GOAL BP (BLOOD PRESSURE) < 140/90: ICD-10-CM

## 2017-06-01 DIAGNOSIS — E11.9 TYPE 2 DIABETES, HBA1C GOAL < 8% (H): ICD-10-CM

## 2017-06-01 DIAGNOSIS — E78.5 HYPERLIPIDEMIA LDL GOAL <100: Primary | ICD-10-CM

## 2017-06-01 NOTE — TELEPHONE ENCOUNTER
Gemfibrozil 600mg        Last Written Prescription Date: 10/19/16  Last Fill Quantity: 180, # refills: 2    Last Office Visit with [facility] or [facility] prescribing provider:  02/10/17 Dr. Baum   Future Office Visit:      Cholesterol   Date Value Ref Range Status   02/10/2017 147 <200 mg/dL Final     HDL Cholesterol   Date Value Ref Range Status   02/10/2017 52 >39 mg/dL Final     LDL Cholesterol Calculated   Date Value Ref Range Status   02/10/2017 69 <100 mg/dL Final     Comment:     Desirable:       <100 mg/dl     Triglycerides   Date Value Ref Range Status   02/10/2017 131 <150 mg/dL Final     Comment:     Fasting specimen     Cholesterol/HDL Ratio   Date Value Ref Range Status   08/21/2015 2.6 0.0 - 5.0 Final     ALT   Date Value Ref Range Status   02/10/2017 20 0 - 70 U/L Final

## 2017-06-03 NOTE — TELEPHONE ENCOUNTER
Routing refill request to provider for review/approval because:  Drug interaction warning  Funmi Carrillo RN, BSN

## 2017-06-05 RX ORDER — GEMFIBROZIL 600 MG/1
TABLET, FILM COATED ORAL
Qty: 180 TABLET | Refills: 0 | Status: SHIPPED | OUTPATIENT
Start: 2017-06-05 | End: 2017-09-30

## 2017-06-14 ENCOUNTER — OFFICE VISIT (OUTPATIENT)
Dept: FAMILY MEDICINE | Facility: CLINIC | Age: 72
End: 2017-06-14
Payer: MEDICARE

## 2017-06-14 VITALS
SYSTOLIC BLOOD PRESSURE: 145 MMHG | WEIGHT: 171.6 LBS | HEIGHT: 65 IN | RESPIRATION RATE: 14 BRPM | DIASTOLIC BLOOD PRESSURE: 69 MMHG | TEMPERATURE: 97.6 F | BODY MASS INDEX: 28.59 KG/M2 | HEART RATE: 63 BPM | OXYGEN SATURATION: 97 %

## 2017-06-14 DIAGNOSIS — I10 ESSENTIAL HYPERTENSION, BENIGN: Primary | ICD-10-CM

## 2017-06-14 DIAGNOSIS — Z11.59 NEED FOR HEPATITIS C SCREENING TEST: ICD-10-CM

## 2017-06-14 DIAGNOSIS — Z23 NEED FOR PNEUMOCOCCAL VACCINATION: ICD-10-CM

## 2017-06-14 DIAGNOSIS — E11.9 TYPE 2 DIABETES MELLITUS WITHOUT COMPLICATION, WITHOUT LONG-TERM CURRENT USE OF INSULIN (H): ICD-10-CM

## 2017-06-14 LAB — HBA1C MFR BLD: 6.5 % (ref 4.3–6)

## 2017-06-14 PROCEDURE — 80048 BASIC METABOLIC PNL TOTAL CA: CPT | Performed by: FAMILY MEDICINE

## 2017-06-14 PROCEDURE — G0472 HEP C SCREEN HIGH RISK/OTHER: HCPCS | Performed by: FAMILY MEDICINE

## 2017-06-14 PROCEDURE — 84443 ASSAY THYROID STIM HORMONE: CPT | Performed by: FAMILY MEDICINE

## 2017-06-14 PROCEDURE — 99214 OFFICE O/P EST MOD 30 MIN: CPT | Mod: 25 | Performed by: FAMILY MEDICINE

## 2017-06-14 PROCEDURE — 86803 HEPATITIS C AB TEST: CPT | Performed by: FAMILY MEDICINE

## 2017-06-14 PROCEDURE — 90670 PCV13 VACCINE IM: CPT | Performed by: FAMILY MEDICINE

## 2017-06-14 PROCEDURE — 36415 COLL VENOUS BLD VENIPUNCTURE: CPT | Performed by: FAMILY MEDICINE

## 2017-06-14 PROCEDURE — G0009 ADMIN PNEUMOCOCCAL VACCINE: HCPCS | Performed by: FAMILY MEDICINE

## 2017-06-14 PROCEDURE — 83036 HEMOGLOBIN GLYCOSYLATED A1C: CPT | Performed by: FAMILY MEDICINE

## 2017-06-14 RX ORDER — LISINOPRIL 20 MG/1
20 TABLET ORAL DAILY
Qty: 90 TABLET | Refills: 3 | Status: SHIPPED | OUTPATIENT
Start: 2017-06-14 | End: 2018-06-27

## 2017-06-14 NOTE — PROGRESS NOTES
Answers for HPI/ROS submitted by the patient on 6/11/2017   Chronic problems general questions HPI Form  Frequency of checking blood sugars:: 1 time a day  Diabetic concerns:: None  Hypoglycemia symptoms:: None  Paraesthesia present:: No  Eye Exam in the last year:: Yes  Date of last Diabetic Eye Exam:: 6/6/2016  Past Medical History:   Diagnosis Date     Abnormal glucose      Acute right-sided low back pain with right-sided sciatica 6/27/2016     Generalized osteoarthrosis, unspecified site      [place] (hard of hearing), bilateral      Unspecified essential hypertension        Past Surgical History:   Procedure Laterality Date     C APPENDECTOMY       C TOTAL HIP ARTHROPLASTY      Hip Replacement, Total     COLONOSCOPY N/A 3/2/2017    Procedure: COMBINED COLONOSCOPY, SINGLE OR MULTIPLE BIOPSY/POLYPECTOMY BY BIOPSY;  Surgeon: Ru Deal MD;  Location: [facility]     HC REMOVE TONSILS/ADENOIDS,<11 Y/O      T & A <12y.o.       Family History   Problem Relation Age of Onset     DIABETES Mother      HEART DISEASE Mother      Eye Disorder Mother      CEREBROVASCULAR DISEASE Father      Musculoskeletal Disorder Sister      Prostate Cancer No family hx of      Cancer - colorectal No family hx of        Social History   Substance Use Topics     Smoking status: Never Smoker     Smokeless tobacco: Never Used     Alcohol use 0.0 oz/week     0 Standard drinks or equivalent per week      Comment: occasioanlly     Subjective:   Sushant Zepeda is a 72 year old male with hypertension.  Current Outpatient Prescriptions   Medication Sig Dispense Refill     lisinopril (PRINIVIL/ZESTRIL) 20 MG tablet Take 1 tablet (20 mg) by mouth daily 90 tablet 3     gemfibrozil (LOPID) 600 MG tablet TAKE 1 TABLET BY MOUTH TWICE DAILY 180 tablet 0     atorvastatin (LIPITOR) 40 MG tablet TAKE ONE TABLET BY MOUTH ONE TIME DAILY 90 tablet 3     amoxicillin (AMOXIL) 500 MG capsule TAKE 4 TABLETS BY MOUTH 1 HOUR BEFORE DENTAL APPOINTMENT  0     blood  "glucose monitoring (NO BRAND SPECIFIED) test strip by In Vitro route 2 times daily. Test as directed, PLEASE DISPENSE PER PT INSURANCE PER PT PREFERENCE 300 each 3     EPINEPHrine (EPIPEN) 0.3 MG/0.3ML injection Inject 0.3 mLs (0.3 mg) into the muscle once as needed 2 each 1     Blood Glucose Calibration (GLUCOSE CONTROL) solution 1 drop by Test Solution route every 30 days 1 Bottle prn     lancets thin MISC 1 each 2 times daily. PLEASE DISPENSE PER PT INSURANCE PER PT PREFERENCE 100 each 2     Blood Glucose Monitoring Suppl (BLOOD GLUCOSE METER) KIT 1 kit 2 times daily. DISPENSE PER PT INSURANCE PER PT PREFERENCE 1 kit 0     ACCU-CHEK FASTCLIX LANCETS MISC 1 Device 2 times daily. 102 Device 11     CO Q-10 100 MG OR CAPS 1 tab QD  0     NIACIN 500 MG OR TBCR two daily 30 0     ACCU-CHEK SAMANTHA W/DEVICE KIT test twice daily 1 0     MULTI-VITAMIN OR TABS 1 tablet daily       GLUCOSAMINE CHONDROITIN OR TABS   0     [DISCONTINUED] lisinopril (PRINIVIL/ZESTRIL) 10 MG tablet TAKE 1 TABLET (10 MG) BY MOUTH DAILY 90 tablet 0      Hypertension ROS: taking medications as instructed, no medication side effects noted, no TIA's, no chest pain on exertion, no dyspnea on exertion, no swelling of ankles.   New concerns: diabetes, asvd and hypertension not at goal    Increase his med dose.     Objective:   /69 (BP Location: Left arm, Patient Position: Chair, Cuff Size: Adult Regular)  Pulse 63  Temp 97.6  F (36.4  C) (Oral)  Resp 14  Ht 5' 5\" (1.651 m)  Wt 171 lb 9.6 oz (77.8 kg)  SpO2 97%  BMI 28.56 kg/m2   Appearance healthy, alert and cooperative.  General exam BP noted to be moderately elevated today in office, S1, S2 normal, no gallop, no murmur, chest clear, no JVD, no HSM, no edema.   Lab review: labs reviewed, I note that basic metabolic panel  normal.     Assessment:    Hypertension poorly controlled    Get pharm d monitoring and six week follow up .     Plan:   orders and follow up as documented in EpicCare, " reviewed diet, exercise and weight control, recommended sodium restriction.  (I10) Essential hypertension, benign  (primary encounter diagnosis)  Comment:   Plan: lisinopril (PRINIVIL/ZESTRIL) 20 MG tablet,         Basic metabolic panel  (Ca, Cl, CO2, Creat,         Gluc, K, Na, BUN)            (E11.9) Type 2 diabetes mellitus without complication, without long-term current use of insulin (H)  Comment:   Plan: Hemoglobin A1c, TSH            (Z11.59) Need for hepatitis C screening test  Comment:   Plan: **Hepatitis C Screen Reflex to RNA FUTURE         anytime            (Z23) Need for pneumococcal vaccination  Comment:   Plan: PNEUMOCOCCAL CONJ VACCINE 13 VALENT IM

## 2017-06-14 NOTE — MR AVS SNAPSHOT
After Visit Summary   6/14/2017    Sushant Zepeda    MRN: [id number]           Patient Information     Date Of Birth          1945        Visit Information        Provider Department      6/14/2017 10:15 AM Yfn Baum MD [facility]        Today's Diagnoses     Essential hypertension, benign    -  1    Type 2 diabetes mellitus without complication, without long-term current use of insulin (H)        Need for hepatitis C screening test        Need for pneumococcal vaccination           Follow-ups after your visit        Who to contact     If you have questions or need follow up information about today's clinic visit or your schedule please contact [facility] directly at [phone].  Normal or non-critical lab and imaging results will be communicated to you by MyChart, letter or phone within 4 business days after the clinic has received the results. If you do not hear from us within 7 days, please contact the clinic through "[employer]hart or phone. If you have a critical or abnormal lab result, we will notify you by phone as soon as possible.  Submit refill requests through [employer] or call your pharmacy and they will forward the refill request to us. Please allow 3 business days for your refill to be completed.          Additional Information About Your Visit        MyChart Information     [employer] gives you secure access to your electronic health record. If you see a primary care provider, you can also send messages to your care team and make appointments. If you have questions, please call your primary care clinic.  If you do not have a primary care provider, please call [phone] and they will assist you.        Care EveryWhere ID     This is your Care EveryWhere ID. This could be used by other organizations to access your [facility] medical records  [id number]        Your Vitals Were     Pulse Temperature Respirations Height Pulse Oximetry BMI  "(Body Mass Index)    63 97.6  F (36.4  C) (Oral) 14 5' 5\" (1.651 m) 97% 28.56 kg/m2       Blood Pressure from Last 3 Encounters:   06/14/17 145/69   03/02/17 128/67   02/10/17 130/72    Weight from Last 3 Encounters:   06/14/17 171 lb 9.6 oz (77.8 kg)   02/10/17 177 lb (80.3 kg)   09/30/16 173 lb 12.8 oz (78.8 kg)              We Performed the Following     **Hepatitis C Screen Reflex to RNA FUTURE anytime     Basic metabolic panel  (Ca, Cl, CO2, Creat, Gluc, K, Na, BUN)     Hemoglobin A1c     PNEUMOCOCCAL CONJ VACCINE 13 VALENT IM     TSH          Today's Medication Changes          These changes are accurate as of: 6/14/17  9:56 PM.  If you have any questions, ask your nurse or doctor.               These medicines have changed or have updated prescriptions.        Dose/Directions    lisinopril 20 MG tablet   Commonly known as:  PRINIVIL/ZESTRIL   This may have changed:  See the new instructions.   Used for:  Essential hypertension, benign   Changed by:  Yfn Baum MD        Dose:  20 mg   Take 1 tablet (20 mg) by mouth daily   Quantity:  90 tablet   Refills:  3            Where to get your medicines      These medications were sent to [place] [address] [place] [facility] [address]  [address] [facility] [address]     Phone:  [phone]     lisinopril 20 MG tablet                Primary Care Provider Office Phone # Fax #    Yfn Baum [phone]       [facility] [address] [facility] [address]        Thank you!     Thank you for choosing [facility]  for your care. Our goal is always to provide you with excellent care. Hearing back from our patients is one way we can continue to improve our services. Please take a few minutes to complete the written survey that you may receive in the mail after your visit with us. Thank you!             Your Updated Medication List - Protect others around you: Learn how to " safely use, store and throw away your medicines at www.disposemymeds.org.          This list is accurate as of: 6/14/17  9:56 PM.  Always use your most recent med list.                   Brand Name Dispense Instructions for use    * ACCU-CHEK SAMANTHA W/DEVICE Kit     1    test twice daily       * blood glucose monitoring meter device kit    no brand specified    1 kit    1 kit 2 times daily. DISPENSE PER PT INSURANCE PER PT PREFERENCE       amoxicillin 500 MG capsule    AMOXIL     TAKE 4 TABLETS BY MOUTH 1 HOUR BEFORE DENTAL APPOINTMENT       atorvastatin 40 MG tablet    LIPITOR    90 tablet    TAKE ONE TABLET BY MOUTH ONE TIME DAILY       blood glucose calibration solution    no brand specified    1 Bottle    1 drop by Test Solution route every 30 days       * blood glucose monitoring lancets     102 Device    1 Device 2 times daily.       * thin lancets    NO BRAND SPECIFIED    100 each    1 each 2 times daily. PLEASE DISPENSE PER PT INSURANCE PER PT PREFERENCE       blood glucose monitoring test strip    no brand specified    300 each    by In Vitro route 2 times daily. Test as directed, PLEASE DISPENSE PER PT INSURANCE PER PT PREFERENCE       coenzyme Q-10 100 MG Caps      1 tab QD       EPINEPHrine 0.3 MG/0.3ML injection     2 each    Inject 0.3 mLs (0.3 mg) into the muscle once as needed       gemfibrozil 600 MG tablet    LOPID    180 tablet    TAKE 1 TABLET BY MOUTH TWICE DAILY       GLUCOSAMINE CHONDROITIN Tabs          lisinopril 20 MG tablet    PRINIVIL/ZESTRIL    90 tablet    Take 1 tablet (20 mg) by mouth daily       Multi-vitamin Tabs tablet   Generic drug:  multivitamin, therapeutic with minerals      1 tablet daily       Niacin 500 MG Tbcr     30    two daily       * Notice:  This list has 4 medication(s) that are the same as other medications prescribed for you. Read the directions carefully, and ask your doctor or other care provider to review them with you.

## 2017-06-15 LAB
ANION GAP SERPL CALCULATED.3IONS-SCNC: 13 MMOL/L (ref 3–14)
BUN SERPL-MCNC: 26 MG/DL (ref 7–30)
CALCIUM SERPL-MCNC: 9.2 MG/DL (ref 8.5–10.1)
CHLORIDE SERPL-SCNC: 105 MMOL/L (ref 94–109)
CO2 SERPL-SCNC: 21 MMOL/L (ref 20–32)
CREAT SERPL-MCNC: 1.06 MG/DL (ref 0.66–1.25)
GFR SERPL CREATININE-BSD FRML MDRD: 69 ML/MIN/1.7M2
GLUCOSE SERPL-MCNC: 150 MG/DL (ref 70–99)
HCV AB SERPL QL IA: NORMAL
POTASSIUM SERPL-SCNC: 4.6 MMOL/L (ref 3.4–5.3)
SODIUM SERPL-SCNC: 139 MMOL/L (ref 133–144)
TSH SERPL DL<=0.05 MIU/L-ACNC: 1.32 MU/L (ref 0.4–4)

## 2017-07-05 ENCOUNTER — ALLIED HEALTH/NURSE VISIT (OUTPATIENT)
Dept: FAMILY MEDICINE | Facility: CLINIC | Age: 72
End: 2017-07-05
Payer: MEDICARE

## 2017-07-05 VITALS — DIASTOLIC BLOOD PRESSURE: 62 MMHG | SYSTOLIC BLOOD PRESSURE: 132 MMHG

## 2017-07-05 DIAGNOSIS — I10 HYPERTENSION GOAL BP (BLOOD PRESSURE) < 140/90: Primary | ICD-10-CM

## 2017-07-05 PROCEDURE — 99207 ZZC NO CHARGE NURSE ONLY: CPT | Performed by: FAMILY MEDICINE

## 2017-07-05 NOTE — PROGRESS NOTES
Sushant Zepeda is enrolled/participating in the retail pharmacy Blood Pressure Goals Achievement Program (BPGAP).  Sushant Zepeda was evaluated at [facility] on July 5, 2017 at which time his blood pressure was:    BP Readings from Last 3 Encounters:   07/05/17 132/62   06/14/17 145/69   03/02/17 128/67     Reviewed lifestyle modifications for blood pressure control and reduction: including making healthy food choices, managing weight, getting regular exercise, smoking cessation, reducing alcohol consumption, monitoring blood pressure regularly.     Sushant Zepeda is not experiencing symptoms.    Follow-Up: BP is at goal of < 140/90mmHg (patient 18+ years of age with or without diabetes).  Recommended follow-up at pharmacy in 6 months.     Sushant Zepeda was evaluated for enrollment into the PGEN study today.    Patient eligible for enrollment:  No  Patient interested in enrollment:  No    Completed by: Terrie Cano, PharmD  [facility]

## 2017-07-05 NOTE — MR AVS SNAPSHOT
After Visit Summary   7/5/2017    Sushant Zepeda    MRN: [id number]           Patient Information     Date Of Birth          1945        Visit Information        Provider Department      7/5/2017 2:54 PM Yfn Baum MD [facility]        Today's Diagnoses     Hypertension goal BP (blood pressure) < 140/90    -  1       Follow-ups after your visit        Who to contact     If you have questions or need follow up information about today's clinic visit or your schedule please contact [facility] directly at [phone].  Normal or non-critical lab and imaging results will be communicated to you by [employer], letter or phone within 4 business days after the clinic has received the results. If you do not hear from us within 7 days, please contact the clinic through [employer] or phone. If you have a critical or abnormal lab result, we will notify you by phone as soon as possible.  Submit refill requests through [employer] or call your pharmacy and they will forward the refill request to us. Please allow 3 business days for your refill to be completed.          Additional Information About Your Visit        MyChart Information     [employer] gives you secure access to your electronic health record. If you see a primary care provider, you can also send messages to your care team and make appointments. If you have questions, please call your primary care clinic.  If you do not have a primary care provider, please call [phone] and they will assist you.        Care EveryWhere ID     This is your Care EveryWhere ID. This could be used by other organizations to access your [facility] medical records  [id number]         Blood Pressure from Last 3 Encounters:   07/05/17 132/62   06/14/17 145/69   03/02/17 128/67    Weight from Last 3 Encounters:   06/14/17 171 lb 9.6 oz (77.8 kg)   02/10/17 177 lb (80.3 kg)   09/30/16 173 lb 12.8 oz (78.8 kg)              Today, you  had the following     No orders found for display       Primary Care Provider Office Phone # Fax #    Yfn David Baum [phone]       [facility] [address] [facility] [address]        Equal Access to Services     DANIELLE AUGUSTO : Hadii aad ku hadkario Soomaali, waaxda luqadaha, qaybta kaalmada adeegyada, waxay idiin hayaan adeeg khnicolesh lawestley ferrer. So [facility] [phone].    ATENCIÓN: Si habla español, tiene a fernández disposición servicios gratuitos de asistencia lingüística. Llame al [phone].    We comply with applicable federal civil rights laws and [place] laws. We do not discriminate on the basis of race, color, national origin, age, disability sex, sexual orientation or gender identity.            Thank you!     Thank you for choosing [facility]  for your care. Our goal is always to provide you with excellent care. Hearing back from our patients is one way we can continue to improve our services. Please take a few minutes to complete the written survey that you may receive in the mail after your visit with us. Thank you!             Your Updated Medication List - Protect others around you: Learn how to safely use, store and throw away your medicines at www.disposemymeds.org.          This list is accurate as of: 7/5/17  2:56 PM.  Always use your most recent med list.                   Brand Name Dispense Instructions for use Diagnosis    * ACCU-CHEK SAMANTHA W/DEVICE Kit     1    test twice daily    Type II or unspecified type diabetes mellitus without mention of complication, not stated as uncontrolled       * blood glucose monitoring meter device kit    no brand specified    1 kit    1 kit 2 times daily. DISPENSE PER PT INSURANCE PER PT PREFERENCE    Type II or unspecified type diabetes mellitus without mention of complication, not stated as uncontrolled       amoxicillin 500 MG capsule    AMOXIL     TAKE 4 TABLETS BY MOUTH 1 HOUR BEFORE DENTAL  APPOINTMENT    Type 2 diabetes mellitus without complication, without long-term current use of insulin (H)       atorvastatin 40 MG tablet    LIPITOR    90 tablet    TAKE ONE TABLET BY MOUTH ONE TIME DAILY    Hyperlipidemia LDL goal <100       blood glucose calibration solution    no brand specified    1 Bottle    1 drop by Test Solution route every 30 days    Type 2 diabetes, HbA1C goal < 8% (H)       * blood glucose monitoring lancets     102 Device    1 Device 2 times daily.    Type II or unspecified type diabetes mellitus without mention of complication, not stated as uncontrolled       * thin lancets    NO BRAND SPECIFIED    100 each    1 each 2 times daily. PLEASE DISPENSE PER PT INSURANCE PER PT PREFERENCE    Type II or unspecified type diabetes mellitus without mention of complication, not stated as uncontrolled       blood glucose monitoring test strip    no brand specified    300 each    by In Vitro route 2 times daily. Test as directed, PLEASE DISPENSE PER PT INSURANCE PER PT PREFERENCE    Type 2 diabetes, HbA1C goal < 8% (H)       coenzyme Q-10 100 MG Caps      1 tab QD        EPINEPHrine 0.3 MG/0.3ML injection     2 each    Inject 0.3 mLs (0.3 mg) into the muscle once as needed    Allergic state, sequela       gemfibrozil 600 MG tablet    LOPID    180 tablet    TAKE 1 TABLET BY MOUTH TWICE DAILY    Hyperlipidemia LDL goal <100       GLUCOSAMINE CHONDROITIN Tabs           lisinopril 20 MG tablet    PRINIVIL/ZESTRIL    90 tablet    Take 1 tablet (20 mg) by mouth daily    Essential hypertension, benign       Multi-vitamin Tabs tablet   Generic drug:  multivitamin, therapeutic with minerals      1 tablet daily        Niacin 500 MG Tbcr     30    two daily        * Notice:  This list has 4 medication(s) that are the same as other medications prescribed for you. Read the directions carefully, and ask your doctor or other care provider to review them with you.

## 2017-09-30 DIAGNOSIS — E78.5 HYPERLIPIDEMIA LDL GOAL <100: ICD-10-CM

## 2017-10-02 RX ORDER — GEMFIBROZIL 600 MG/1
TABLET, FILM COATED ORAL
Qty: 180 TABLET | Refills: 1 | Status: SHIPPED | OUTPATIENT
Start: 2017-10-02 | End: 2018-04-01

## 2017-10-02 NOTE — TELEPHONE ENCOUNTER
Prescription approved per [facility] Refill Protocol.    Madeline DOAN RN, BSN, PHN  [place] [facility]

## 2017-10-02 NOTE — TELEPHONE ENCOUNTER
Pending Prescriptions:                       Disp   Refills    gemfibrozil (LOPID) 600 MG tablet [Pharma*180 ta*0            Sig: TAKE 1 TABLET BY MOUTH TWICE DAILY               Last Written Prescription Date: 6/5/2017  Last Fill Quantity: 180, # refills: 0    Last Office Visit with FMLUCIA, ADDY or [facility] prescribing provider:  6/14/2017Sarika     Future Office Visit:      Cholesterol   Date Value Ref Range Status   02/10/2017 147 <200 mg/dL Final     HDL Cholesterol   Date Value Ref Range Status   02/10/2017 52 >39 mg/dL Final     LDL Cholesterol Calculated   Date Value Ref Range Status   02/10/2017 69 <100 mg/dL Final     Comment:     Desirable:       <100 mg/dl     Triglycerides   Date Value Ref Range Status   02/10/2017 131 <150 mg/dL Final     Comment:     Fasting specimen     Cholesterol/HDL Ratio   Date Value Ref Range Status   08/21/2015 2.6 0.0 - 5.0 Final     ALT   Date Value Ref Range Status   02/10/2017 20 0 - 70 U/L Final

## 2017-12-06 ENCOUNTER — OFFICE VISIT (OUTPATIENT)
Dept: FAMILY MEDICINE | Facility: CLINIC | Age: 72
End: 2017-12-06
Payer: MEDICARE

## 2017-12-06 VITALS
HEART RATE: 66 BPM | DIASTOLIC BLOOD PRESSURE: 62 MMHG | RESPIRATION RATE: 12 BRPM | SYSTOLIC BLOOD PRESSURE: 125 MMHG | OXYGEN SATURATION: 97 % | WEIGHT: 173.2 LBS | BODY MASS INDEX: 28.82 KG/M2 | TEMPERATURE: 97.3 F

## 2017-12-06 DIAGNOSIS — E11.9 TYPE 2 DIABETES MELLITUS WITHOUT COMPLICATION, WITHOUT LONG-TERM CURRENT USE OF INSULIN (H): Primary | ICD-10-CM

## 2017-12-06 LAB — HBA1C MFR BLD: 6.3 % (ref 4.3–6)

## 2017-12-06 PROCEDURE — 36415 COLL VENOUS BLD VENIPUNCTURE: CPT | Performed by: FAMILY MEDICINE

## 2017-12-06 PROCEDURE — 83036 HEMOGLOBIN GLYCOSYLATED A1C: CPT | Performed by: FAMILY MEDICINE

## 2017-12-06 PROCEDURE — 99213 OFFICE O/P EST LOW 20 MIN: CPT | Performed by: FAMILY MEDICINE

## 2017-12-06 NOTE — PROGRESS NOTES
"  SUBJECTIVE:   Sushant Zepeda is a 72 year old male who presents to clinic today for the following health issues:      Diabetes Follow-up    Patient is checking blood sugars: once daily.  Results are as follows:       am - 107        Diabetic concerns: None     Symptoms of hypoglycemia (low blood sugar): none     Paresthesias (numbness or burning in feet) or sores: No     Date of last diabetic eye exam: 6/17  BP Readings from Last 2 Encounters:   07/05/17 132/62   06/14/17 145/69     Hemoglobin A1C (%)   Date Value   06/14/2017 6.5 (H)   02/10/2017 6.8 (H)     LDL Cholesterol Calculated (mg/dL)   Date Value   02/10/2017 69   02/19/2016 62     LDL Cholesterol Direct (mg/dL)   Date Value   08/23/2016 83         Amount of exercise or physical activity: walking     Problems taking medications regularly: No    Medication side effects: none    Diet: regular (no restrictions)        asvd and diabetes, no chest pain or associated symptoms     Problem list and histories reviewed & adjusted, as indicated.  Additional history: as documented    BP Readings from Last 3 Encounters:   12/06/17 125/62   07/05/17 132/62   06/14/17 145/69    Wt Readings from Last 3 Encounters:   12/06/17 173 lb 3.2 oz (78.6 kg)   06/14/17 171 lb 9.6 oz (77.8 kg)   02/10/17 177 lb (80.3 kg)                      Reviewed and updated as needed this visit by clinical staff     Reviewed and updated as needed this visit by Provider         ROS:  Constitutional, HEENT, cardiovascular, pulmonary, GI, , musculoskeletal, neuro, skin, endocrine and psych systems are negative, except as otherwise noted.      OBJECTIVE:   /62 (BP Location: Left arm, Patient Position: Chair, Cuff Size: Adult Large)  Pulse 66  Temp 97.3  F (36.3  C) (Oral)  Resp 12  Wt 173 lb 3.2 oz (78.6 kg)  HC 65\" (165.1 cm)  SpO2 97%  BMI 28.82 kg/m2  Body mass index is 28.82 kg/(m^2).  GENERAL: healthy, alert and no distress  EYES: Eyes grossly normal to inspection, PERRL and " conjunctivae and sclerae normal  HENT: ear canals and TM's normal, nose and mouth without ulcers or lesions  NECK: no adenopathy, no asymmetry, masses, or scars and thyroid normal to palpation  RESP: lungs clear to auscultation - no rales, rhonchi or wheezes  CV: regular rate and rhythm, normal S1 S2, no S3 or S4, no murmur, click or rub, no peripheral edema and peripheral pulses strong  ABDOMEN: soft, nontender, no hepatosplenomegaly, no masses and bowel sounds normal  MS: no gross musculoskeletal defects noted, no edema  SKIN: no suspicious lesions or rashes  NEURO: Normal strength and tone, mentation intact and speech normal  PSYCH: mentation appears normal, affect normal/bright    Diagnostic Test Results:  a1c in range     ASSESSMENT/PLAN:       (E11.9) Type 2 diabetes mellitus without complication, without long-term current use of insulin (H)  (primary encounter diagnosis)  Comment:   Plan: Hemoglobin A1c SIX MONTH             Yfn Baum MD  [facility]

## 2017-12-06 NOTE — MR AVS SNAPSHOT
"              After Visit Summary   12/6/2017    Sushant Zepeda    MRN: [id number]           Patient Information     Date Of Birth          1945        Visit Information        Provider Department      12/6/2017 10:00 AM Yfn Baum MD [facility]        Today's Diagnoses     Type 2 diabetes mellitus without complication, without long-term current use of insulin (H)    -  1       Follow-ups after your visit        Who to contact     If you have questions or need follow up information about today's clinic visit or your schedule please contact [facility] directly at [phone].  Normal or non-critical lab and imaging results will be communicated to you by MyChart, letter or phone within 4 business days after the clinic has received the results. If you do not hear from us within 7 days, please contact the clinic through [employer] or phone. If you have a critical or abnormal lab result, we will notify you by phone as soon as possible.  Submit refill requests through [employer] or call your pharmacy and they will forward the refill request to us. Please allow 3 business days for your refill to be completed.          Additional Information About Your Visit        MyChart Information     [employer] gives you secure access to your electronic health record. If you see a primary care provider, you can also send messages to your care team and make appointments. If you have questions, please call your primary care clinic.  If you do not have a primary care provider, please call [phone] and they will assist you.        Care EveryWhere ID     This is your Care EveryWhere ID. This could be used by other organizations to access your [place] medical records  [id number]        Your Vitals Were     Pulse Temperature Respirations Head Circumference Pulse Oximetry BMI (Body Mass Index)    66 97.3  F (36.3  C) (Oral) 12 65\" (165.1 cm) 97% 28.82 kg/m2       Blood Pressure from Last " 3 Encounters:   12/06/17 125/62   07/05/17 132/62   06/14/17 145/69    Weight from Last 3 Encounters:   12/06/17 173 lb 3.2 oz (78.6 kg)   06/14/17 171 lb 9.6 oz (77.8 kg)   02/10/17 177 lb (80.3 kg)              We Performed the Following     Hemoglobin A1c        Primary Care Provider Office Phone # Fax #    Yfn Baum [phone] [address] [facility] [address]        Equal Access to Services     [facility]: Hadii aad ku hadasho Soomaali, waaxda luqadaha, qaybta kaalmada adeegyada, milton reyes haynoemy tenorio . So [facility] [phone].    ATENCIÓN: Si habla español, tiene a fernández disposición servicios gratuitos de asistencia lingüística. [facility] [phone].    We comply with applicable federal civil rights laws and [place] laws. We do not discriminate on the basis of race, color, national origin, age, disability, sex, sexual orientation, or gender identity.            Thank you!     Thank you for choosing [facility]  for your care. Our goal is always to provide you with excellent care. Hearing back from our patients is one way we can continue to improve our services. Please take a few minutes to complete the written survey that you may receive in the mail after your visit with us. Thank you!             Your Updated Medication List - Protect others around you: Learn how to safely use, store and throw away your medicines at www.disposemymeds.org.          This list is accurate as of: 12/6/17 10:37 AM.  Always use your most recent med list.                   Brand Name Dispense Instructions for use Diagnosis    * ACCU-CHEK SAMANTHA W/DEVICE Kit     1    test twice daily    Type II or unspecified type diabetes mellitus without mention of complication, not stated as uncontrolled       * blood glucose monitoring meter device kit    no brand specified    1 kit    1 kit 2 times daily. DISPENSE PER PT INSURANCE PER PT PREFERENCE    Type II or  unspecified type diabetes mellitus without mention of complication, not stated as uncontrolled       amoxicillin 500 MG capsule    AMOXIL     TAKE 4 TABLETS BY MOUTH 1 HOUR BEFORE DENTAL APPOINTMENT    Type 2 diabetes mellitus without complication, without long-term current use of insulin (H)       atorvastatin 40 MG tablet    LIPITOR    90 tablet    TAKE ONE TABLET BY MOUTH ONE TIME DAILY    Hyperlipidemia LDL goal <100       blood glucose calibration solution    no brand specified    1 Bottle    1 drop by Test Solution route every 30 days    Type 2 diabetes, HbA1C goal < 8% (H)       * blood glucose monitoring lancets     102 Device    1 Device 2 times daily.    Type II or unspecified type diabetes mellitus without mention of complication, not stated as uncontrolled       * thin lancets    NO BRAND SPECIFIED    100 each    1 each 2 times daily. PLEASE DISPENSE PER PT INSURANCE PER PT PREFERENCE    Type II or unspecified type diabetes mellitus without mention of complication, not stated as uncontrolled       blood glucose monitoring test strip    no brand specified    300 each    by In Vitro route 2 times daily. Test as directed, PLEASE DISPENSE PER PT INSURANCE PER PT PREFERENCE    Type 2 diabetes, HbA1C goal < 8% (H)       coenzyme Q-10 100 MG Caps      1 tab QD        EPINEPHrine 0.3 MG/0.3ML injection 2-pack    EPIPEN/ADRENACLICK/or ANY BX GENERIC EQUIV    2 each    Inject 0.3 mLs (0.3 mg) into the muscle once as needed    Allergic state, sequela       gemfibrozil 600 MG tablet    LOPID    180 tablet    TAKE 1 TABLET BY MOUTH TWICE DAILY    Hyperlipidemia LDL goal <100       GLUCOSAMINE CHONDROITIN Tabs           lisinopril 20 MG tablet    PRINIVIL/ZESTRIL    90 tablet    Take 1 tablet (20 mg) by mouth daily    Essential hypertension, benign       Multi-vitamin Tabs tablet   Generic drug:  multivitamin, therapeutic with minerals      1 tablet daily        Niacin 500 MG Tbcr     30    two daily        * Notice:   This list has 4 medication(s) that are the same as other medications prescribed for you. Read the directions carefully, and ask your doctor or other care provider to review them with you.

## 2017-12-22 ENCOUNTER — TELEPHONE (OUTPATIENT)
Dept: FAMILY MEDICINE | Facility: CLINIC | Age: 72
End: 2017-12-22

## 2017-12-22 NOTE — LETTER
[facility]  [id number] [place] [id number] [phone]          December 22, 2017    Sushant Zepeda                                                                                                                     [address] [facility] [address]      To Whom It May Concern:    Sushant Zepeda is a patient of mine. He has diabetes that is well controlled by diet only.    Component      Latest Ref Rng & Units 12/6/2017   Hemoglobin A1C      4.3 - 6.0 % 6.3 (H)           Sincerely,         Yfn Baum MD

## 2017-12-22 NOTE — TELEPHONE ENCOUNTER
Pt calls, needs letter written for [employer] appointment 12/26 that diabetes is in good control and not on meds, also wants a1c result included, would like to , routed to ISAAC LAZO MD, please confirm, inform pt when final and placed up front    **letter started, please tweek as needed**    Radha Frazier RN, BSN  Message handled by Nurse Triage.

## 2018-02-16 ENCOUNTER — OFFICE VISIT (OUTPATIENT)
Dept: FAMILY MEDICINE | Facility: CLINIC | Age: 73
End: 2018-02-16
Payer: MEDICARE

## 2018-02-16 VITALS
RESPIRATION RATE: 16 BRPM | WEIGHT: 171.3 LBS | SYSTOLIC BLOOD PRESSURE: 122 MMHG | HEART RATE: 70 BPM | OXYGEN SATURATION: 98 % | HEIGHT: 65 IN | DIASTOLIC BLOOD PRESSURE: 70 MMHG | BODY MASS INDEX: 28.54 KG/M2 | TEMPERATURE: 97.8 F

## 2018-02-16 DIAGNOSIS — H11.432: Primary | ICD-10-CM

## 2018-02-16 PROCEDURE — 99214 OFFICE O/P EST MOD 30 MIN: CPT | Performed by: FAMILY MEDICINE

## 2018-02-16 ASSESSMENT — ENCOUNTER SYMPTOMS
EYE REDNESS: 1
PHOTOPHOBIA: 0
EYE PAIN: 0
EYE DISCHARGE: 0
DOUBLE VISION: 0
NEUROLOGICAL NEGATIVE: 1
CONSTITUTIONAL NEGATIVE: 1
BLURRED VISION: 0

## 2018-02-16 NOTE — PROGRESS NOTES
HPI    SUBJECTIVE:   Sushant Zepeda is a 72 year old male who presents to clinic today for the following health issues:      Eye(s) Problem      Duration: Early Today the Pt noticed his eye problem in the morning.    Description:  Location: left eye  Pain: no  Redness: YES  Discharge: no    Accompanying signs and symptoms: None    History (Trauma, foreign body exposure,): Pt states that it feels like he has an eye lash stuck in his eye.     Precipitating or alleviating factors (contact use): None    Therapies tried and outcome: None    Woke this am with marked redness in medial aspect of L eye.  Slight sensation of FB, but really very minor.  No blurry vision, photophobia, HA, weeping, d/c.  Has regular eye exams no previous concerns for glaucoma.  No congestion, cough, sore throat.  No recent hammering, sawing, grinding.      Review of Systems   Constitutional: Negative.    HENT: Negative.    Eyes: Positive for redness. Negative for blurred vision, double vision, photophobia, pain and discharge.   Neurological: Negative.          Physical Exam   Constitutional: He is oriented to person, place, and time and well-developed, well-nourished, and in no distress.   Eyes: Pupils are equal, round, and reactive to light. Left eye exhibits no chemosis and no exudate. No foreign body present in the left eye. Left conjunctiva is injected. Left conjunctiva has no hemorrhage. Left eye exhibits normal extraocular motion.   Slit lamp exam:       The left eye shows no corneal abrasion and no fluorescein uptake.   Neurological: He is alert and oriented to person, place, and time.   Skin: Skin is warm and dry.   Vitals reviewed.    (H11.432) Hyperemia of eye, left  (primary encounter diagnosis)  Comment: marked dilation of the conjunctival vessels on the medial aspect of the eye.  No FB, no gladys hemorrhage.  Suspect small irritant overnight last night  Plan: liquid tears every 3-4 hours.  F/u if not improving in 2-3 days.      RTC  in 2-3d prn    Samm Baeza MD

## 2018-02-16 NOTE — MR AVS SNAPSHOT
"              After Visit Summary   2/16/2018    Sushant Zepeda    MRN: [id number]           Patient Information     Date Of Birth          1945        Visit Information        Provider Department      2/16/2018 3:20 PM Smam Baeza MD [facility]        Today's Diagnoses     Hyperemia of eye, left    -  1       Follow-ups after your visit        Follow-up notes from your care team     Return in about 2 weeks (around 3/2/2018).      Who to contact     If you have questions or need follow up information about today's clinic visit or your schedule please contact [facility] directly at [phone].  Normal or non-critical lab and imaging results will be communicated to you by MyChart, letter or phone within 4 business days after the clinic has received the results. If you do not hear from us within 7 days, please contact the clinic through [employer] or phone. If you have a critical or abnormal lab result, we will notify you by phone as soon as possible.  Submit refill requests through [employer] or call your pharmacy and they will forward the refill request to us. Please allow 3 business days for your refill to be completed.          Additional Information About Your Visit        MyChart Information     [employer] gives you secure access to your electronic health record. If you see a primary care provider, you can also send messages to your care team and make appointments. If you have questions, please call your primary care clinic.  If you do not have a primary care provider, please call [phone] and they will assist you.        Care EveryWhere ID     This is your Care EveryWhere ID. This could be used by other organizations to access your [place] medical records  [id number]        Your Vitals Were     Pulse Temperature Respirations Height Pulse Oximetry BMI (Body Mass Index)    70 97.8  F (36.6  C) (Oral) 16 5' 5\" (1.651 m) 98% 28.51 kg/m2       Blood Pressure from " Last 3 Encounters:   02/16/18 122/70   12/06/17 125/62   07/05/17 132/62    Weight from Last 3 Encounters:   02/16/18 171 lb 4.8 oz (77.7 kg)   12/06/17 173 lb 3.2 oz (78.6 kg)   06/14/17 171 lb 9.6 oz (77.8 kg)              Today, you had the following     No orders found for display       Primary Care Provider Office Phone # Fax #    Yfn David Baum [phone] [address] [facility] [address]        Equal Access to Services     [facility]: Hadii aad ku hadasho Soomaali, waaxda luqadaha, qaybta kaalmada adeegyada, milton reyes haysophien adeeg eduin tenorio . So [facility] [phone].    ATENCIÓN: Si habla español, tiene a fernández disposición servicios gratuitos de asistencia lingüística. Llame al [phone].    We comply with applicable federal civil rights laws and [place] laws. We do not discriminate on the basis of race, color, national origin, age, disability, sex, sexual orientation, or gender identity.            Thank you!     Thank you for choosing [facility]  for your care. Our goal is always to provide you with excellent care. Hearing back from our patients is one way we can continue to improve our services. Please take a few minutes to complete the written survey that you may receive in the mail after your visit with us. Thank you!             Your Updated Medication List - Protect others around you: Learn how to safely use, store and throw away your medicines at www.disposemymeds.org.          This list is accurate as of 2/16/18  3:41 PM.  Always use your most recent med list.                   Brand Name Dispense Instructions for use Diagnosis    * ACCU-CHEK SAMANTHA W/DEVICE Kit     1    test twice daily    Type II or unspecified type diabetes mellitus without mention of complication, not stated as uncontrolled       * blood glucose monitoring meter device kit    no brand specified    1 kit    1 kit 2 times daily. DISPENSE PER PT INSURANCE PER PT  PREFERENCE    Type II or unspecified type diabetes mellitus without mention of complication, not stated as uncontrolled       amoxicillin 500 MG capsule    AMOXIL     TAKE 4 TABLETS BY MOUTH 1 HOUR BEFORE DENTAL APPOINTMENT    Type 2 diabetes mellitus without complication, without long-term current use of insulin (H)       atorvastatin 40 MG tablet    LIPITOR    90 tablet    TAKE ONE TABLET BY MOUTH ONE TIME DAILY    Hyperlipidemia LDL goal <100       blood glucose calibration solution    no brand specified    1 Bottle    1 drop by Test Solution route every 30 days    Type 2 diabetes, HbA1C goal < 8% (H)       * blood glucose monitoring lancets     102 Device    1 Device 2 times daily.    Type II or unspecified type diabetes mellitus without mention of complication, not stated as uncontrolled       * thin lancets    NO BRAND SPECIFIED    100 each    1 each 2 times daily. PLEASE DISPENSE PER PT INSURANCE PER PT PREFERENCE    Type II or unspecified type diabetes mellitus without mention of complication, not stated as uncontrolled       blood glucose monitoring test strip    no brand specified    300 each    by In Vitro route 2 times daily. Test as directed, PLEASE DISPENSE PER PT INSURANCE PER PT PREFERENCE    Type 2 diabetes, HbA1C goal < 8% (H)       coenzyme Q-10 100 MG Caps      1 tab QD        EPINEPHrine 0.3 MG/0.3ML injection 2-pack    EPIPEN/ADRENACLICK/or ANY BX GENERIC EQUIV    2 each    Inject 0.3 mLs (0.3 mg) into the muscle once as needed    Allergic state, sequela       gemfibrozil 600 MG tablet    LOPID    180 tablet    TAKE 1 TABLET BY MOUTH TWICE DAILY    Hyperlipidemia LDL goal <100       GLUCOSAMINE CHONDROITIN Tabs           lisinopril 20 MG tablet    PRINIVIL/ZESTRIL    90 tablet    Take 1 tablet (20 mg) by mouth daily    Essential hypertension, benign       Multi-vitamin Tabs tablet   Generic drug:  multivitamin, therapeutic with minerals      1 tablet daily        Niacin 500 MG Tbcr     30     two daily        * Notice:  This list has 4 medication(s) that are the same as other medications prescribed for you. Read the directions carefully, and ask your doctor or other care provider to review them with you.

## 2018-03-07 ENCOUNTER — OFFICE VISIT (OUTPATIENT)
Dept: FAMILY MEDICINE | Facility: CLINIC | Age: 73
End: 2018-03-07
Payer: MEDICARE

## 2018-03-07 VITALS
TEMPERATURE: 97.4 F | BODY MASS INDEX: 28.41 KG/M2 | SYSTOLIC BLOOD PRESSURE: 121 MMHG | HEART RATE: 63 BPM | RESPIRATION RATE: 12 BRPM | OXYGEN SATURATION: 99 % | HEIGHT: 65 IN | DIASTOLIC BLOOD PRESSURE: 67 MMHG | WEIGHT: 170.5 LBS

## 2018-03-07 DIAGNOSIS — N18.2 CKD (CHRONIC KIDNEY DISEASE) STAGE 2, GFR 60-89 ML/MIN: ICD-10-CM

## 2018-03-07 DIAGNOSIS — I10 HYPERTENSION GOAL BP (BLOOD PRESSURE) < 140/90: Primary | ICD-10-CM

## 2018-03-07 DIAGNOSIS — E11.9 TYPE 2 DIABETES MELLITUS WITHOUT COMPLICATION, WITHOUT LONG-TERM CURRENT USE OF INSULIN (H): ICD-10-CM

## 2018-03-07 LAB
ALBUMIN SERPL-MCNC: 4.5 G/DL (ref 3.4–5)
ALP SERPL-CCNC: 93 U/L (ref 40–150)
ALT SERPL W P-5'-P-CCNC: 15 U/L (ref 0–70)
ANION GAP SERPL CALCULATED.3IONS-SCNC: 8 MMOL/L (ref 3–14)
AST SERPL W P-5'-P-CCNC: 18 U/L (ref 0–45)
BILIRUB SERPL-MCNC: 0.5 MG/DL (ref 0.2–1.3)
BUN SERPL-MCNC: 25 MG/DL (ref 7–30)
CALCIUM SERPL-MCNC: 9.2 MG/DL (ref 8.5–10.1)
CHLORIDE SERPL-SCNC: 105 MMOL/L (ref 94–109)
CHOLEST SERPL-MCNC: 141 MG/DL
CO2 SERPL-SCNC: 23 MMOL/L (ref 20–32)
CREAT SERPL-MCNC: 1.22 MG/DL (ref 0.66–1.25)
CREAT UR-MCNC: 61 MG/DL
GFR SERPL CREATININE-BSD FRML MDRD: 58 ML/MIN/1.7M2
GLUCOSE SERPL-MCNC: 105 MG/DL (ref 70–99)
HBA1C MFR BLD: 6.5 % (ref 4.3–6)
HDLC SERPL-MCNC: 53 MG/DL
LDLC SERPL CALC-MCNC: 72 MG/DL
MICROALBUMIN UR-MCNC: 9 MG/L
MICROALBUMIN/CREAT UR: 13.91 MG/G CR (ref 0–17)
NONHDLC SERPL-MCNC: 88 MG/DL
POTASSIUM SERPL-SCNC: 4.8 MMOL/L (ref 3.4–5.3)
PROT SERPL-MCNC: 7.8 G/DL (ref 6.8–8.8)
SODIUM SERPL-SCNC: 136 MMOL/L (ref 133–144)
TRIGL SERPL-MCNC: 82 MG/DL

## 2018-03-07 PROCEDURE — 82043 UR ALBUMIN QUANTITATIVE: CPT | Performed by: FAMILY MEDICINE

## 2018-03-07 PROCEDURE — 36415 COLL VENOUS BLD VENIPUNCTURE: CPT | Performed by: FAMILY MEDICINE

## 2018-03-07 PROCEDURE — 80053 COMPREHEN METABOLIC PANEL: CPT | Performed by: FAMILY MEDICINE

## 2018-03-07 PROCEDURE — 83036 HEMOGLOBIN GLYCOSYLATED A1C: CPT | Performed by: FAMILY MEDICINE

## 2018-03-07 PROCEDURE — 80061 LIPID PANEL: CPT | Performed by: FAMILY MEDICINE

## 2018-03-07 PROCEDURE — 99213 OFFICE O/P EST LOW 20 MIN: CPT | Performed by: FAMILY MEDICINE

## 2018-03-07 NOTE — MR AVS SNAPSHOT
After Visit Summary   3/7/2018    Sushant Zepeda    MRN: [id number]           Patient Information     Date Of Birth          1945        Visit Information        Provider Department      3/7/2018 9:00 AM Yfn Baum MD [facility]        Today's Diagnoses     Hypertension goal BP (blood pressure) < 140/90    -  1    Type 2 diabetes mellitus without complication, without long-term current use of insulin (H)          Care Instructions    Good work!! Follow up again in July           Follow-ups after your visit        Who to contact     If you have questions or need follow up information about today's clinic visit or your schedule please contact [facility] directly at [phone].  Normal or non-critical lab and imaging results will be communicated to you by [employer], letter or phone within 4 business days after the clinic has received the results. If you do not hear from us within 7 days, please contact the clinic through [employer] or phone. If you have a critical or abnormal lab result, we will notify you by phone as soon as possible.  Submit refill requests through [employer] or call your pharmacy and they will forward the refill request to us. Please allow 3 business days for your refill to be completed.          Additional Information About Your Visit        MyChart Information     [employer] gives you secure access to your electronic health record. If you see a primary care provider, you can also send messages to your care team and make appointments. If you have questions, please call your primary care clinic.  If you do not have a primary care provider, please call [phone] and they will assist you.        Care EveryWhere ID     This is your Care EveryWhere ID. This could be used by other organizations to access your [place] medical records  [id number]        Your Vitals Were     Pulse Temperature Respirations Height Pulse Oximetry BMI (Body  "Mass Index)    63 97.4  F (36.3  C) (Oral) 12 5' 5\" (1.651 m) 99% 28.37 kg/m2       Blood Pressure from Last 3 Encounters:   03/07/18 121/67   02/16/18 122/70   12/06/17 125/62    Weight from Last 3 Encounters:   03/07/18 170 lb 8 oz (77.3 kg)   02/16/18 171 lb 4.8 oz (77.7 kg)   12/06/17 173 lb 3.2 oz (78.6 kg)              We Performed the Following     Albumin Random Urine Quantitative with Creat Ratio     Comprehensive metabolic panel     Hemoglobin A1c     Lipid panel reflex to direct LDL Fasting        Primary Care Provider Office Phone # Fax #    Yfn Baum [phone] [address] [facility] [address]        Equal Access to Services     [facility]ANIEL[facility] : Hadii aad ku hadasho Soomaali, waaxda luqadaha, qaybta kaalmada adeegyada, milton reyes hayaagenny tenorio . So [facility] [phone].    ATENCIÓN: Si habla español, tiene a fernández disposición servicios gratuitos de asistencia lingüística. Zoey al [phone].    We comply with applicable federal civil rights laws and [place] laws. We do not discriminate on the basis of race, color, national origin, age, disability, sex, sexual orientation, or gender identity.            Thank you!     Thank you for choosing [facility]  for your care. Our goal is always to provide you with excellent care. Hearing back from our patients is one way we can continue to improve our services. Please take a few minutes to complete the written survey that you may receive in the mail after your visit with us. Thank you!             Your Updated Medication List - Protect others around you: Learn how to safely use, store and throw away your medicines at www.disposemymeds.org.          This list is accurate as of 3/7/18  9:11 AM.  Always use your most recent med list.                   Brand Name Dispense Instructions for use Diagnosis    * ACCU-CHEK SAMANTHA W/DEVICE Kit     1    test twice daily    Type II or unspecified type " diabetes mellitus without mention of complication, not stated as uncontrolled       * blood glucose monitoring meter device kit    no brand specified    1 kit    1 kit 2 times daily. DISPENSE PER PT INSURANCE PER PT PREFERENCE    Type II or unspecified type diabetes mellitus without mention of complication, not stated as uncontrolled       amoxicillin 500 MG capsule    AMOXIL     TAKE 4 TABLETS BY MOUTH 1 HOUR BEFORE DENTAL APPOINTMENT    Type 2 diabetes mellitus without complication, without long-term current use of insulin (H)       atorvastatin 40 MG tablet    LIPITOR    90 tablet    TAKE ONE TABLET BY MOUTH ONE TIME DAILY    Hyperlipidemia LDL goal <100       blood glucose calibration solution    no brand specified    1 Bottle    1 drop by Test Solution route every 30 days    Type 2 diabetes, HbA1C goal < 8% (H)       * blood glucose monitoring lancets     102 Device    1 Device 2 times daily.    Type II or unspecified type diabetes mellitus without mention of complication, not stated as uncontrolled       * thin lancets    NO BRAND SPECIFIED    100 each    1 each 2 times daily. PLEASE DISPENSE PER PT INSURANCE PER PT PREFERENCE    Type II or unspecified type diabetes mellitus without mention of complication, not stated as uncontrolled       blood glucose monitoring test strip    no brand specified    300 each    by In Vitro route 2 times daily. Test as directed, PLEASE DISPENSE PER PT INSURANCE PER PT PREFERENCE    Type 2 diabetes, HbA1C goal < 8% (H)       coenzyme Q-10 100 MG Caps      1 tab QD        EPINEPHrine 0.3 MG/0.3ML injection 2-pack    EPIPEN/ADRENACLICK/or ANY BX GENERIC EQUIV    2 each    Inject 0.3 mLs (0.3 mg) into the muscle once as needed    Allergic state, sequela       gemfibrozil 600 MG tablet    LOPID    180 tablet    TAKE 1 TABLET BY MOUTH TWICE DAILY    Hyperlipidemia LDL goal <100       GLUCOSAMINE CHONDROITIN Tabs           lisinopril 20 MG tablet    PRINIVIL/ZESTRIL    90 tablet     Take 1 tablet (20 mg) by mouth daily    Essential hypertension, benign       Multi-vitamin Tabs tablet   Generic drug:  multivitamin, therapeutic with minerals      1 tablet daily        Niacin 500 MG Tbcr     30    two daily        * Notice:  This list has 4 medication(s) that are the same as other medications prescribed for you. Read the directions carefully, and ask your doctor or other care provider to review them with you.

## 2018-03-07 NOTE — PROGRESS NOTES
Answers for HPI/ROS submitted by the patient on 3/7/2018   Chronic problems general questions HPI Form  Frequency of checking blood sugars:: 1 time a day  Diabetic concerns:: None  Hypoglycemia symptoms:: None  Paraesthesia present:: No  Eye Exam in the last year:: Yes  Date of last Diabetic Eye Exam:: june 23, 2017    SUBJECTIVE:   Sushant Zepeda is a 72 year old male who presents to clinic today for the following health issues:      Patient is here for a diabetes follow up., with ckd mild and hypertension    Past Medical History:   Diagnosis Date     Abnormal glucose      Acute right-sided low back pain with right-sided sciatica 6/27/2016     Generalized osteoarthrosis, unspecified site      [place] (hard of hearing), bilateral      Unspecified essential hypertension        Past Surgical History:   Procedure Laterality Date     C APPENDECTOMY       C TOTAL HIP ARTHROPLASTY      Hip Replacement, Total     COLONOSCOPY N/A 3/2/2017    Procedure: COMBINED COLONOSCOPY, SINGLE OR MULTIPLE BIOPSY/POLYPECTOMY BY BIOPSY;  Surgeon: Ru Deal MD;  Location:  GI     HC REMOVE TONSILS/ADENOIDS,<13 Y/O      T & A <12y.o.       Family History   Problem Relation Age of Onset     DIABETES Mother      HEART DISEASE Mother      Eye Disorder Mother      CEREBROVASCULAR DISEASE Father      Musculoskeletal Disorder Sister      Prostate Cancer No family hx of      Cancer - colorectal No family hx of        Social History   Substance Use Topics     Smoking status: Never Smoker     Smokeless tobacco: Never Used     Alcohol use 0.0 oz/week     0 Standard drinks or equivalent per week      Comment: occasioanlly      REVIEW OF SYSTEMS    Generally has been  feeling well until this episode. No problems with vision, hearing, dental or neck pain.Has  airborne or ingestion allergy  No chest pain, palpitations, dyspnea, change in bowel habits, blood  in stool or dyspepsia.  No rashes, changing moles, weakness, lassitude or back  problems.  No chronic issues . No dysuria  Patient  a smoker. No problems with significant headaches.  On exam the vital signs are stable  Weight is Body mass index is 28.37 kg/(m^2).   Eyes show dixie  No neck masses or thyromegaly.Ear nose and throat shows normal   No bruits, murmers, rubs or extrasounds. No cardiomegaly or chest wall tenderness. Lungs clear, no abdominal masses or organomegaly. No CVA tenderness.  Skin eval no rash  No hernias, good range of motion neck, back and extremities. No abnormal skin lesions. Normal genitalia. Good peripheral pulses. No adenopathy.  Normal gait and stance. Neck is supple.  Back exam shows good rom     (I10) Hypertension goal BP (blood pressure) < 140/90  (primary encounter diagnosis)  Comment:   Current Outpatient Prescriptions   Medication     gemfibrozil (LOPID) 600 MG tablet     lisinopril (PRINIVIL/ZESTRIL) 20 MG tablet     atorvastatin (LIPITOR) 40 MG tablet     amoxicillin (AMOXIL) 500 MG capsule     blood glucose monitoring (NO BRAND SPECIFIED) test strip     EPINEPHrine (EPIPEN) 0.3 MG/0.3ML injection     Blood Glucose Calibration (GLUCOSE CONTROL) solution     lancets thin MISC     Blood Glucose Monitoring Suppl (BLOOD GLUCOSE METER) KIT     ACCU-CHEK FASTCLIX LANCETS MISC     CO Q-10 100 MG OR CAPS     NIACIN 500 MG OR TBCR     ACCU-CHEK SAMANTHA W/DEVICE KIT     MULTI-VITAMIN OR TABS     GLUCOSAMINE CHONDROITIN OR TABS     No current facility-administered medications for this visit.        Plan: continue diet and exercise     (E11.9) Type 2 diabetes mellitus without complication, without long-term current use of insulin (H)  Comment:   Plan: Hemoglobin A1c, Lipid panel reflex to direct         LDL Fasting, Comprehensive metabolic panel,         Albumin Random Urine Quantitative with Creat         Ratio        Review labs

## 2018-04-01 DIAGNOSIS — E78.5 HYPERLIPIDEMIA LDL GOAL <100: ICD-10-CM

## 2018-04-02 NOTE — TELEPHONE ENCOUNTER
"Requested Prescriptions   Pending Prescriptions Disp Refills     gemfibrozil (LOPID) 600 MG tablet [Pharmacy Med Name: GEMFIBROZIL 600 MG TABLET]  Last Written Prescription Date:  10/2/2017  Last Fill Quantity: 180 tablet,  # refills: 1   Last office visit: 3/7/2018 with prescribing provider:  Sarika    180 tablet 1     Sig: TAKE 1 TABLET BY MOUTH TWICE DAILY    Fibrates Passed    4/1/2018  2:05 PM       Passed - Lipid panel on file in past 12 months    Recent Labs   Lab Test  03/07/18   0916   08/21/15   0959   CHOL  141   < >  140   TRIG  82   < >  91   HDL  53   < >  53   LDL  72   < >  69   NHDL  88   < >   --    VLDL   --    --   18   CHOLHDLRATIO   --    --   2.6    < > = values in this interval not displayed.          Passed - No abnormal creatine kinase in past 12 months    No lab results found.          Passed - Recent (12 mo) or future (30 days) visit within the authorizing provider's specialty    Patient had office visit in the last 12 months or has a visit in the next 30 days with authorizing provider or within the authorizing provider's specialty.  See \"Patient Info\" tab in inbasket, or \"Choose Columns\" in Meds & Orders section of the refill encounter.           Passed - Patient is age 18 or older        atorvastatin (LIPITOR) 40 MG tablet [Pharmacy Med Name: ATORVASTATIN 40 MG TABLET]  Last Written Prescription Date:  4/7/2017  Last Fill Quantity: 90 tablet,  # refills: 3   Last office visit: 3/7/2018 with prescribing provider:  Sarika    90 tablet 3     Sig: TAKE ONE TABLET BY MOUTH ONE TIME DAILY    Statins Protocol Passed    4/1/2018  2:05 PM       Passed - LDL on file in past 12 months    Recent Labs   Lab Test  03/07/18   0916   LDL  72          Passed - No abnormal creatine kinase in past 12 months    No lab results found.        Passed - Recent (12 mo) or future (30 days) visit within the authorizing provider's specialty    Patient had office visit in the last 12 months or has a visit in the " "next 30 days with authorizing provider or within the authorizing provider's specialty.  See \"Patient Info\" tab in inbasket, or \"Choose Columns\" in Meds & Orders section of the refill encounter.           Passed - Patient is age 18 or older          "

## 2018-04-03 RX ORDER — ATORVASTATIN CALCIUM 40 MG/1
TABLET, FILM COATED ORAL
Qty: 90 TABLET | Refills: 3 | Status: SHIPPED | OUTPATIENT
Start: 2018-04-03 | End: 2019-03-18

## 2018-04-03 RX ORDER — GEMFIBROZIL 600 MG/1
TABLET, FILM COATED ORAL
Qty: 180 TABLET | Refills: 1 | Status: SHIPPED | OUTPATIENT
Start: 2018-04-03 | End: 2018-07-12

## 2018-04-06 DIAGNOSIS — E11.9 TYPE 2 DIABETES, HBA1C GOAL < 8% (H): ICD-10-CM

## 2018-04-09 NOTE — TELEPHONE ENCOUNTER
Prescription approved per [facility] Refill Protocol  Omaira Spaulding RN BS

## 2018-04-09 NOTE — TELEPHONE ENCOUNTER
"Requested Prescriptions   Pending Prescriptions Disp Refills     RAHUL CONTOUR test strip [Pharmacy Med Name: CONTOUR TEST STRIPS]  Last Written Prescription Date:  12/9/2016  Last Fill Quantity: 300each,  # refills: 3   Last office visit: 3/7/2018 with prescribing provider:  Sarika    300 strip 2     Sig: TEST TWICE DAILY    Diabetic Supplies Protocol Passed    4/6/2018 10:10 PM       Passed - Patient is 18 years of age or older       Passed - Recent (6 mo) or future (30 days) visit within the authorizing provider's specialty    Patient had office visit in the last 6 months or has a visit in the next 30 days with authorizing provider.  See \"Patient Info\" tab in inbasket, or \"Choose Columns\" in Meds & Orders section of the refill encounter.              "

## 2018-04-19 ENCOUNTER — OFFICE VISIT (OUTPATIENT)
Dept: FAMILY MEDICINE | Facility: CLINIC | Age: 73
End: 2018-04-19
Payer: MEDICARE

## 2018-04-19 VITALS
OXYGEN SATURATION: 98 % | RESPIRATION RATE: 12 BRPM | SYSTOLIC BLOOD PRESSURE: 116 MMHG | BODY MASS INDEX: 28.69 KG/M2 | TEMPERATURE: 97.5 F | WEIGHT: 172.4 LBS | DIASTOLIC BLOOD PRESSURE: 62 MMHG | HEART RATE: 71 BPM

## 2018-04-19 DIAGNOSIS — N18.2 SECONDARY DIABETES MELLITUS WITH STAGE 2 CHRONIC KIDNEY DISEASE (H): ICD-10-CM

## 2018-04-19 DIAGNOSIS — E13.22 SECONDARY DIABETES MELLITUS WITH STAGE 2 CHRONIC KIDNEY DISEASE (H): ICD-10-CM

## 2018-04-19 DIAGNOSIS — N18.2 CKD (CHRONIC KIDNEY DISEASE) STAGE 2, GFR 60-89 ML/MIN: Primary | ICD-10-CM

## 2018-04-19 PROCEDURE — 99213 OFFICE O/P EST LOW 20 MIN: CPT | Performed by: FAMILY MEDICINE

## 2018-04-19 NOTE — PROGRESS NOTES
SUBJECTIVE:   Sushant Zepeda is a 72 year old male who presents to clinic today for the following health issues:      ED/UC Followup:    Facility:  [facility] in [place]  Date of visit: 4/12/18  Reason for visit: right ear pain  Current Status: still doesn't feel right     Past Medical History:   Diagnosis Date     Abnormal glucose      Acute right-sided low back pain with right-sided sciatica 6/27/2016     Generalized osteoarthrosis, unspecified site      [place] (hard of hearing), bilateral      Unspecified essential hypertension        Past Surgical History:   Procedure Laterality Date     C APPENDECTOMY       C TOTAL HIP ARTHROPLASTY      Hip Replacement, Total     COLONOSCOPY N/A 3/2/2017    Procedure: COMBINED COLONOSCOPY, SINGLE OR MULTIPLE BIOPSY/POLYPECTOMY BY BIOPSY;  Surgeon: Ru Deal MD;  Location:  GI     HC REMOVE TONSILS/ADENOIDS,<11 Y/O      T & A <12y.o.       Family History   Problem Relation Age of Onset     DIABETES Mother      HEART DISEASE Mother      Eye Disorder Mother      CEREBROVASCULAR DISEASE Father      Musculoskeletal Disorder Sister      Prostate Cancer No family hx of      Cancer - colorectal No family hx of        Social History   Substance Use Topics     Smoking status: Never Smoker     Smokeless tobacco: Never Used     Alcohol use 0.0 oz/week     0 Standard drinks or equivalent per week      Comment: occasioanlly              Patient complains of painful ear times 5days and  Mild uri symptoms.  No lassitude, sore throat, nausea, vomitting. Associated congestion no cough.  Right ear  plugging and sense of hearing loss.  No history of asthma or allergy. No significant travel hisory or water exposure.  No problems with vision, hearing, productive cough, wheeze, chest pain  abd pain, bowel changes, rash, or specific joint pain.  [facility] has had previous Otitis as a clinical issue.  Allergies, meds and HPH reviewed.  Exam shows  Pharynx N, neck supple, soft abdoman and  lungs are clear. .  Normal heart sds, no rash,no adenopathy, TM is dull left and dull and minimally red right  dtrs are symmetrical and thyroid normal  A-Otitis    Media   P-fever control, decongestants and meds  as directed.  Follow up prn    He travels by air weekly , grounded him for 10 more days  .  asom resolving after barotrauma

## 2018-04-19 NOTE — MR AVS SNAPSHOT
After Visit Summary   4/19/2018    Sushant Zepeda    MRN: [id number]           Patient Information     Date Of Birth          1945        Visit Information        Provider Department      4/19/2018 9:15 AM Yfn Baum MD [facility]         Follow-ups after your visit        Who to contact     If you have questions or need follow up information about today's clinic visit or your schedule please contact [facility] directly at [phone].  Normal or non-critical lab and imaging results will be communicated to you by MyChart, letter or phone within 4 business days after the clinic has received the results. If you do not hear from us within 7 days, please contact the clinic through [employer] or phone. If you have a critical or abnormal lab result, we will notify you by phone as soon as possible.  Submit refill requests through [employer] or call your pharmacy and they will forward the refill request to us. Please allow 3 business days for your refill to be completed.          Additional Information About Your Visit        MyChart Information     [employer] gives you secure access to your electronic health record. If you see a primary care provider, you can also send messages to your care team and make appointments. If you have questions, please call your primary care clinic.  If you do not have a primary care provider, please call [phone] and they will assist you.        Care EveryWhere ID     This is your Care EveryWhere ID. This could be used by other organizations to access your [place] medical records  [id number]        Your Vitals Were     Pulse Temperature Respirations Pulse Oximetry BMI (Body Mass Index)       71 97.5  F (36.4  C) (Oral) 12 98% 28.69 kg/m2        Blood Pressure from Last 3 Encounters:   04/19/18 116/62   03/07/18 121/67   02/16/18 122/70    Weight from Last 3 Encounters:   04/19/18 172 lb 6.4 oz (78.2 kg)   03/07/18 170 lb 8  oz (77.3 kg)   02/16/18 171 lb 4.8 oz (77.7 kg)              Today, you had the following     No orders found for display       Primary Care Provider Office Phone # Fax #    Yfn Baum [phone] [address]  [facility] [id number]        Equal Access to Services     [facility] : Hadii aad ku hadasho Soomaali, waaxda luqadaha, qaybta kaalmada adeegyada, waxay idiin hayaan adeeg kharash la'aan . So [facility] [phone].    ATENCIÓN: Si habla español, tiene a fernández disposición servicios gratuitos de asistencia lingüística. Llame al [phone].    We comply with applicable federal civil rights laws and [place] laws. We do not discriminate on the basis of race, color, national origin, age, disability, sex, sexual orientation, or gender identity.            Thank you!     Thank you for choosing [facility]  for your care. Our goal is always to provide you with excellent care. Hearing back from our patients is one way we can continue to improve our services. Please take a few minutes to complete the written survey that you may receive in the mail after your visit with us. Thank you!             Your Updated Medication List - Protect others around you: Learn how to safely use, store and throw away your medicines at www.disposemymeds.org.          This list is accurate as of 4/19/18  9:27 AM.  Always use your most recent med list.                   Brand Name Dispense Instructions for use Diagnosis    * ACCU-CHEK SAMANTHA W/DEVICE Kit     1    test twice daily    Type II or unspecified type diabetes mellitus without mention of complication, not stated as uncontrolled       * blood glucose monitoring meter device kit    no brand specified    1 kit    1 kit 2 times daily. DISPENSE PER PT INSURANCE PER PT PREFERENCE    Type II or unspecified type diabetes mellitus without mention of complication, not stated as uncontrolled       amoxicillin 500 MG capsule    AMOXIL      TAKE 4 TABLETS BY MOUTH 1 HOUR BEFORE DENTAL APPOINTMENT    Type 2 diabetes mellitus without complication, without long-term current use of insulin (H)       atorvastatin 40 MG tablet    LIPITOR    90 tablet    TAKE ONE TABLET BY MOUTH ONE TIME DAILY    Hyperlipidemia LDL goal <100       RAHUL CONTOUR test strip   Generic drug:  blood glucose monitoring     300 strip    TEST TWICE DAILY    Type 2 diabetes, HbA1C goal < 8% (H)       blood glucose calibration solution    no brand specified    1 Bottle    1 drop by Test Solution route every 30 days    Type 2 diabetes, HbA1C goal < 8% (H)       * blood glucose monitoring lancets     102 Device    1 Device 2 times daily.    Type II or unspecified type diabetes mellitus without mention of complication, not stated as uncontrolled       * thin lancets    NO BRAND SPECIFIED    100 each    1 each 2 times daily. PLEASE DISPENSE PER PT INSURANCE PER PT PREFERENCE    Type II or unspecified type diabetes mellitus without mention of complication, not stated as uncontrolled       coenzyme Q-10 100 MG Caps      1 tab QD        EPINEPHrine 0.3 MG/0.3ML injection 2-pack    EPIPEN/ADRENACLICK/or ANY BX GENERIC EQUIV    2 each    Inject 0.3 mLs (0.3 mg) into the muscle once as needed    Allergic state, sequela       gemfibrozil 600 MG tablet    LOPID    180 tablet    TAKE 1 TABLET BY MOUTH TWICE DAILY    Hyperlipidemia LDL goal <100       GLUCOSAMINE CHONDROITIN Tabs           lisinopril 20 MG tablet    PRINIVIL/ZESTRIL    90 tablet    Take 1 tablet (20 mg) by mouth daily    Essential hypertension, benign       Multi-vitamin Tabs tablet   Generic drug:  multivitamin, therapeutic with minerals      1 tablet daily        Niacin 500 MG Tbcr     30    two daily        * Notice:  This list has 4 medication(s) that are the same as other medications prescribed for you. Read the directions carefully, and ask your doctor or other care provider to review them with you.

## 2018-06-27 DIAGNOSIS — I10 ESSENTIAL HYPERTENSION, BENIGN: ICD-10-CM

## 2018-06-27 RX ORDER — LISINOPRIL 20 MG/1
TABLET ORAL
Qty: 90 TABLET | Refills: 3 | Status: SHIPPED | OUTPATIENT
Start: 2018-06-27 | End: 2019-03-18

## 2018-06-27 NOTE — TELEPHONE ENCOUNTER
"Last Written Prescription Date:  6.14.17  Last Fill Quantity: 90,  # refills: 3   Last office visit: 4/19/2018 with prescribing provider:  Sarika   Future Office Visit:      Requested Prescriptions   Pending Prescriptions Disp Refills     lisinopril (PRINIVIL/ZESTRIL) 20 MG tablet [Pharmacy Med Name: LISINOPRIL 20 MG TABLET] 90 tablet 3     Sig: TAKE 1 TABLET (20 MG) BY MOUTH DAILY    ACE Inhibitors (Including Combos) Protocol Passed    6/27/2018  8:07 AM       Passed - Blood pressure under 140/90 in past 12 months    BP Readings from Last 3 Encounters:   04/19/18 116/62   03/07/18 121/67   02/16/18 122/70                Passed - Recent (12 mo) or future (30 days) visit within the authorizing provider's specialty    Patient had office visit in the last 12 months or has a visit in the next 30 days with authorizing provider or within the authorizing provider's specialty.  See \"Patient Info\" tab in inbasket, or \"Choose Columns\" in Meds & Orders section of the refill encounter.           Passed - Patient is age 18 or older       Passed - Normal serum creatinine on file in past 12 months    Recent Labs   Lab Test  03/07/18   0916   CR  1.22            Passed - Normal serum potassium on file in past 12 months    Recent Labs   Lab Test  03/07/18   0916   POTASSIUM  4.8             Prescription approved per [facility] Refill Protocol  Omaira Spaulding RN BS    "

## 2018-07-12 ENCOUNTER — OFFICE VISIT (OUTPATIENT)
Dept: FAMILY MEDICINE | Facility: CLINIC | Age: 73
End: 2018-07-12
Payer: MEDICARE

## 2018-07-12 VITALS
RESPIRATION RATE: 12 BRPM | TEMPERATURE: 97.5 F | SYSTOLIC BLOOD PRESSURE: 118 MMHG | DIASTOLIC BLOOD PRESSURE: 66 MMHG | WEIGHT: 168.5 LBS | OXYGEN SATURATION: 98 % | BODY MASS INDEX: 28.04 KG/M2 | HEART RATE: 62 BPM

## 2018-07-12 DIAGNOSIS — E11.9 TYPE 2 DIABETES MELLITUS WITHOUT COMPLICATION, WITHOUT LONG-TERM CURRENT USE OF INSULIN (H): Primary | ICD-10-CM

## 2018-07-12 DIAGNOSIS — N40.0 BENIGN PROSTATIC HYPERPLASIA WITHOUT LOWER URINARY TRACT SYMPTOMS: ICD-10-CM

## 2018-07-12 DIAGNOSIS — E78.5 HYPERLIPIDEMIA LDL GOAL <100: ICD-10-CM

## 2018-07-12 DIAGNOSIS — I10 HYPERTENSION GOAL BP (BLOOD PRESSURE) < 140/90: ICD-10-CM

## 2018-07-12 LAB — HBA1C MFR BLD: 6.3 % (ref 0–5.6)

## 2018-07-12 PROCEDURE — 83036 HEMOGLOBIN GLYCOSYLATED A1C: CPT | Performed by: FAMILY MEDICINE

## 2018-07-12 PROCEDURE — 99214 OFFICE O/P EST MOD 30 MIN: CPT | Performed by: FAMILY MEDICINE

## 2018-07-12 PROCEDURE — 36415 COLL VENOUS BLD VENIPUNCTURE: CPT | Performed by: FAMILY MEDICINE

## 2018-07-12 RX ORDER — GEMFIBROZIL 600 MG/1
600 TABLET, FILM COATED ORAL 2 TIMES DAILY
Qty: 180 TABLET | Refills: 1 | Status: SHIPPED | OUTPATIENT
Start: 2018-07-12 | End: 2019-03-18

## 2018-07-12 NOTE — PROGRESS NOTES
SUBJECTIVE:   Sushant Zepeda is a 73 year old male who presents to clinic today for the following health issues:      Diabetes Follow-up    Patient is checking blood sugars: once daily.  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    Problems taking medications regularly: No    Medication side effects: none    Diet: carbohydrate counting  Past Medical History:   Diagnosis Date     Abnormal glucose      Acute right-sided low back pain with right-sided sciatica 6/27/2016     Generalized osteoarthrosis, unspecified site      [place] (hard of hearing), bilateral      Unspecified essential hypertension        Past Surgical History:   Procedure Laterality Date     C APPENDECTOMY       C TOTAL HIP ARTHROPLASTY      Hip Replacement, Total     COLONOSCOPY N/A 3/2/2017    Procedure: COMBINED COLONOSCOPY, SINGLE OR MULTIPLE BIOPSY/POLYPECTOMY BY BIOPSY;  Surgeon: Ru Deal MD;  Location:  GI     HC REMOVE TONSILS/ADENOIDS,<13 Y/O      T & A <12y.o.       Family History   Problem Relation Age of Onset     Diabetes Mother      HEART DISEASE Mother      Eye Disorder Mother      Cerebrovascular Disease Father      Musculoskeletal Disorder Sister      Prostate Cancer No family hx of      Cancer - colorectal No family hx of        Social History   Substance Use Topics     Smoking status: Never Smoker     Smokeless tobacco: Never Used     Alcohol use 0.0 oz/week     0 Standard drinks or equivalent  per week      Comment: occasioanlly              Reviewed and updated as needed this visit by Provider         ROS: busy and feeling well  Constitutional, HEENT, cardiovascular, pulmonary, GI, , musculoskeletal, neuro, skin, endocrine and psych systems are negative, except as otherwise noted.    OBJECTIVE:     /66 (BP Location: Left arm, Patient Position: Chair, Cuff Size: Adult Regular)  Pulse 62  Temp 97.5  F (36.4  C) (Oral)  Resp 12  Wt 168 lb 8 oz (76.4 kg)  SpO2 98%  BMI 28.04 kg/m2  Body mass index is 28.04 kg/(m^2).  GENERAL: healthy, alert and no distress  EYES: Eyes grossly normal to inspection, PERRL and conjunctivae and sclerae normal  HENT: ear canals and TM's normal, nose and mouth without ulcers or lesions  NECK: no adenopathy, no asymmetry, masses, or scars and thyroid normal to palpation  RESP: lungs clear to auscultation - no rales, rhonchi or wheezes  CV: regular rate and rhythm, normal S1 S2, no S3 or S4, no murmur, click or rub, no peripheral edema and peripheral pulses strong  ABDOMEN: soft, nontender, no hepatosplenomegaly, no masses and bowel sounds normal  MS: no gross musculoskeletal defects noted, no edema  SKIN: no suspicious lesions or rashes  NEURO: Normal strength and tone, mentation intact and speech normal  PSYCH: mentation appears normal, affect normal/bright        ASSESSMENT/PLAN:   (E11.9) Type 2 diabetes mellitus without complication, without long-term current use of insulin (H)  (primary encounter diagnosis)  Comment:   Plan: Hemoglobin A1c        Doing well    (I10) Hypertension goal BP (blood pressure) < 140/90  Comment:   Plan:   Current Outpatient Prescriptions   Medication     ACCU-CHEK SAMANTHA W/DEVICE KIT     ACCU-CHEK FASTCLIX LANCETS MISC     amoxicillin (AMOXIL) 500 MG capsule     atorvastatin (LIPITOR) 40 MG tablet     RAHUL CONTOUR test strip     Blood Glucose Calibration (GLUCOSE CONTROL) solution     Blood Glucose Monitoring Suppl (BLOOD GLUCOSE METER)  KIT     CO Q-10 100 MG OR CAPS     EPINEPHrine (EPIPEN) 0.3 MG/0.3ML injection     gemfibrozil (LOPID) 600 MG tablet     GLUCOSAMINE CHONDROITIN OR TABS     lancets thin MISC     MULTI-VITAMIN OR TABS     NIACIN 500 MG OR TBCR     lisinopril (PRINIVIL/ZESTRIL) 20 MG tablet     [DISCONTINUED] gemfibrozil (LOPID) 600 MG tablet     No current facility-administered medications for this visit.          (N40.0) Benign prostatic hyperplasia without lower urinary tract symptoms  Comment: no symptoms       (E78.5) Hyperlipidemia LDL goal <100  Comment:   Plan: gemfibrozil (LOPID) 600 MG tablet              Yfn Baum MD  [facility]

## 2018-07-12 NOTE — MR AVS SNAPSHOT
After Visit Summary   7/12/2018    Sushant Zepeda    MRN: [id number]           Patient Information     Date Of Birth          1945        Visit Information        Provider Department      7/12/2018 8:45 AM Yfn Baum MD [facility]        Today's Diagnoses     Type 2 diabetes mellitus without complication, without long-term current use of insulin (H)    -  1    Hypertension goal BP (blood pressure) < 140/90        Benign prostatic hyperplasia without lower urinary tract symptoms        Hyperlipidemia LDL goal <100           Follow-ups after your visit        Follow-up notes from your care team     Return in about 4 months (around 11/12/2018) for Lab Work, Routine Visit.      Who to contact     If you have questions or need follow up information about today's clinic visit or your schedule please contact [facility] directly at [phone].  Normal or non-critical lab and imaging results will be communicated to you by Wipebookhart, letter or phone within 4 business days after the clinic has received the results. If you do not hear from us within 7 days, please contact the clinic through Wipebookhart or phone. If you have a critical or abnormal lab result, we will notify you by phone as soon as possible.  Submit refill requests through [employer] or call your pharmacy and they will forward the refill request to us. Please allow 3 business days for your refill to be completed.          Additional Information About Your Visit        MyChart Information     [employer] gives you secure access to your electronic health record. If you see a primary care provider, you can also send messages to your care team and make appointments. If you have questions, please call your primary care clinic.  If you do not have a primary care provider, please call [phone] and they will assist you.        Care EveryWhere ID     This is your Care EveryWhere ID. This could be used by  other organizations to access your [place] medical records  [id number]        Your Vitals Were     Pulse Temperature Respirations Pulse Oximetry BMI (Body Mass Index)       62 97.5  F (36.4  C) (Oral) 12 98% 28.04 kg/m2        Blood Pressure from Last 3 Encounters:   07/12/18 118/66   04/19/18 116/62   03/07/18 121/67    Weight from Last 3 Encounters:   07/12/18 168 lb 8 oz (76.4 kg)   04/19/18 172 lb 6.4 oz (78.2 kg)   03/07/18 170 lb 8 oz (77.3 kg)              We Performed the Following     Hemoglobin A1c          Today's Medication Changes          These changes are accurate as of 7/12/18  8:59 AM.  If you have any questions, ask your nurse or doctor.               These medicines have changed or have updated prescriptions.        Dose/Directions    gemfibrozil 600 MG tablet   Commonly known as:  LOPID   This may have changed:  See the new instructions.   Used for:  Hyperlipidemia LDL goal <100   Changed by:  Yfn Baum MD        Dose:  600 mg   Take 1 tablet (600 mg) by mouth 2 times daily   Quantity:  180 tablet   Refills:  1            Where to get your medicines      These medications were sent to [place] [address] [place] [facility] [address]  [address] [facility] [address]     Phone:  [phone]     gemfibrozil 600 MG tablet                Primary Care Provider Office Phone # Fax #    Yfn Baum [phone] [address] [facility] [address]        Equal Access to Services     DANIELLE CASAS : Hadii mayra ku hadasho Sobrittaniali, waaxda luqadaha, qaybta kaalmada adeegyada, milton idiwalter ferrer. So [facility] [phone].    ATENCIÓN: Si habla español, tiene a fernández disposición servicios gratuitos de asistencia lingüística. Llame al [phone].    We comply with applicable federal civil rights laws and [place] laws. We do not discriminate on the basis of race, color, national origin, age, disability, sex, sexual  orientation, or gender identity.            Thank you!     Thank you for choosing [facility]  for your care. Our goal is always to provide you with excellent care. Hearing back from our patients is one way we can continue to improve our services. Please take a few minutes to complete the written survey that you may receive in the mail after your visit with us. Thank you!             Your Updated Medication List - Protect others around you: Learn how to safely use, store and throw away your medicines at www.disposemymeds.org.          This list is accurate as of 7/12/18  8:59 AM.  Always use your most recent med list.                   Brand Name Dispense Instructions for use Diagnosis    * ACCU-CHEK SAMANTHA W/DEVICE Kit     1    test twice daily    Type II or unspecified type diabetes mellitus without mention of complication, not stated as uncontrolled       * blood glucose monitoring meter device kit    no brand specified    1 kit    1 kit 2 times daily. DISPENSE PER PT INSURANCE PER PT PREFERENCE    Type II or unspecified type diabetes mellitus without mention of complication, not stated as uncontrolled       amoxicillin 500 MG capsule    AMOXIL     TAKE 4 TABLETS BY MOUTH 1 HOUR BEFORE DENTAL APPOINTMENT    Type 2 diabetes mellitus without complication, without long-term current use of insulin (H)       atorvastatin 40 MG tablet    LIPITOR    90 tablet    TAKE ONE TABLET BY MOUTH ONE TIME DAILY    Hyperlipidemia LDL goal <100       RAHUL CONTOUR test strip   Generic drug:  blood glucose monitoring     300 strip    TEST TWICE DAILY    Type 2 diabetes, HbA1C goal < 8% (H)       blood glucose calibration solution    no brand specified    1 Bottle    1 drop by Test Solution route every 30 days    Type 2 diabetes, HbA1C goal < 8% (H)       * blood glucose monitoring lancets     102 Device    1 Device 2 times daily.    Type II or unspecified type diabetes mellitus without mention of complication, not  stated as uncontrolled       * thin lancets    NO BRAND SPECIFIED    100 each    1 each 2 times daily. PLEASE DISPENSE PER PT INSURANCE PER PT PREFERENCE    Type II or unspecified type diabetes mellitus without mention of complication, not stated as uncontrolled       coenzyme Q-10 100 MG Caps      1 tab QD        EPINEPHrine 0.3 MG/0.3ML injection 2-pack    EPIPEN/ADRENACLICK/or ANY BX GENERIC EQUIV    2 each    Inject 0.3 mLs (0.3 mg) into the muscle once as needed    Allergic state, sequela       gemfibrozil 600 MG tablet    LOPID    180 tablet    Take 1 tablet (600 mg) by mouth 2 times daily    Hyperlipidemia LDL goal <100       GLUCOSAMINE CHONDROITIN Tabs           lisinopril 20 MG tablet    PRINIVIL/ZESTRIL    90 tablet    TAKE 1 TABLET (20 MG) BY MOUTH DAILY    Essential hypertension, benign       Multi-vitamin Tabs tablet   Generic drug:  multivitamin, therapeutic with minerals      1 tablet daily        Niacin 500 MG Tbcr     30    two daily        * Notice:  This list has 4 medication(s) that are the same as other medications prescribed for you. Read the directions carefully, and ask your doctor or other care provider to review them with you.

## 2018-07-16 ENCOUNTER — TRANSFERRED RECORDS (OUTPATIENT)
Dept: HEALTH INFORMATION MANAGEMENT | Facility: CLINIC | Age: 73
End: 2018-07-16

## 2018-10-08 DIAGNOSIS — T78.40XS ALLERGIC STATE, SEQUELA: ICD-10-CM

## 2018-10-08 NOTE — TELEPHONE ENCOUNTER
"Requested Prescriptions   Pending Prescriptions Disp Refills     EPINEPHrine (EPIPEN/ADRENACLICK/OR ANY BX GENERIC EQUIV) 0.3 MG/0.3ML injection 2-pack  Last Written Prescription Date:  2/19/16  Last Fill Quantity: 2,  # refills: 1   Last office visit: 7/12/2018 with prescribing provider:  7/12/18   Future Office Visit:           Sig: Inject 0.3 mLs (0.3 mg) into the muscle once as needed    Anaphylaxis Kits Protocol Passed    10/8/2018  2:34 PM       Passed - Recent (12 mo) or future (30 days) visit witin the authorizing provider's specialty    Patient had office visit in the last 12 months or has a visit in the next 30 days with authorizing provider or within the authorizing provider's specialty.  See \"Patient Info\" tab in inbasket, or \"Choose Columns\" in Meds & Orders section of the refill encounter.           Passed - Patient is age 5 or older        Junito Talbot   10/08/18 2:35 PM     "

## 2018-10-10 RX ORDER — EPINEPHRINE 0.3 MG/.3ML
0.3 INJECTION SUBCUTANEOUS
Qty: 0.6 ML | Refills: 3 | Status: SHIPPED | OUTPATIENT
Start: 2018-10-10 | End: 2020-04-18

## 2018-10-10 NOTE — TELEPHONE ENCOUNTER
See last refill, routed to ISAAC LAZO MD, if ok approve and close, may send alternate per fax  Radha Frazier RN, BSN  Message handled by Nurse Triage.

## 2018-10-17 ENCOUNTER — ALLIED HEALTH/NURSE VISIT (OUTPATIENT)
Dept: NURSING | Facility: CLINIC | Age: 73
End: 2018-10-17
Payer: MEDICARE

## 2018-10-17 DIAGNOSIS — Z23 NEED FOR PROPHYLACTIC VACCINATION AND INOCULATION AGAINST INFLUENZA: Primary | ICD-10-CM

## 2018-10-17 PROCEDURE — 90662 IIV NO PRSV INCREASED AG IM: CPT

## 2018-10-17 PROCEDURE — 99207 ZZC NO CHARGE NURSE ONLY: CPT

## 2018-10-17 PROCEDURE — G0008 ADMIN INFLUENZA VIRUS VAC: HCPCS

## 2018-10-17 NOTE — MR AVS SNAPSHOT
After Visit Summary   10/17/2018    Sushant Zepeda    MRN: [id number]           Patient Information     Date Of Birth          1945        Visit Information        Provider Department      10/17/2018 11:30 AM [facility]        Today's Diagnoses     Need for prophylactic vaccination and inoculation against influenza    -  1       Follow-ups after your visit        Your next 10 appointments already scheduled     Oct 17, 2018 11:30 AM CDT   Nurse Only with [facility]   [facility] ([facility]    [address] [place] [id number] [phone]              Who to contact     If you have questions or need follow up information about today's clinic visit or your schedule please contact [facility] directly at [phone].  Normal or non-critical lab and imaging results will be communicated to you by MyChart, letter or phone within 4 business days after the clinic has received the results. If you do not hear from us within 7 days, please contact the clinic through MyChart or phone. If you have a critical or abnormal lab result, we will notify you by phone as soon as possible.  Submit refill requests through [employer] or call your pharmacy and they will forward the refill request to us. Please allow 3 business days for your refill to be completed.          Additional Information About Your Visit        MyChart Information     [employer] gives you secure access to your electronic health record. If you see a primary care provider, you can also send messages to your care team and make appointments. If you have questions, please call your primary care clinic.  If you do not have a primary care provider, please call [phone] and they will assist you.        Care EveryWhere ID     This is your Care EveryWhere ID. This could be used by other organizations to access your [facility]  records  [id number]         Blood Pressure from Last 3 Encounters:   07/12/18 118/66   04/19/18 116/62   03/07/18 121/67    Weight from Last 3 Encounters:   07/12/18 168 lb 8 oz (76.4 kg)   04/19/18 172 lb 6.4 oz (78.2 kg)   03/07/18 170 lb 8 oz (77.3 kg)              We Performed the Following     ADMIN INFLUENZA (For MEDICARE Patients ONLY) []     FLU VACCINE, INCREASED ANTIGEN, PRESV FREE, AGE 65+ [[address]]        Primary Care Provider Office Phone # Fax #    Yfn David Baum [phone] [address] [facility] [address]        Equal Access to Services     DANIELLE CASAS : Hadii aad ku hadasho Soomaali, waaxda luqadaha, qaybta kaalmada adeegyada, milton tenorio . So [facility] [phone].    ATENCIÓN: Si habla español, tiene a fernández disposición servicios gratuitos de asistencia lingüística. [facility] [phone].    We comply with applicable federal civil rights laws and [place] laws. We do not discriminate on the basis of race, color, national origin, age, disability, sex, sexual orientation, or gender identity.            Thank you!     Thank you for choosing [facility]  for your care. Our goal is always to provide you with excellent care. Hearing back from our patients is one way we can continue to improve our services. Please take a few minutes to complete the written survey that you may receive in the mail after your visit with us. Thank you!             Your Updated Medication List - Protect others around you: Learn how to safely use, store and throw away your medicines at www.disposemymeds.org.          This list is accurate as of 10/17/18 11:29 AM.  Always use your most recent med list.                   Brand Name Dispense Instructions for use Diagnosis    * ACCU-CHEK SAMANTHA W/DEVICE Kit     1    test twice daily    Type II or unspecified type diabetes mellitus without mention of complication, not stated as uncontrolled       *  blood glucose monitoring meter device kit    no brand specified    1 kit    1 kit 2 times daily. DISPENSE PER PT INSURANCE PER PT PREFERENCE    Type II or unspecified type diabetes mellitus without mention of complication, not stated as uncontrolled       amoxicillin 500 MG capsule    AMOXIL     TAKE 4 TABLETS BY MOUTH 1 HOUR BEFORE DENTAL APPOINTMENT    Type 2 diabetes mellitus without complication, without long-term current use of insulin (H)       atorvastatin 40 MG tablet    LIPITOR    90 tablet    TAKE ONE TABLET BY MOUTH ONE TIME DAILY    Hyperlipidemia LDL goal <100       RAHUL CONTOUR test strip   Generic drug:  blood glucose monitoring     300 strip    TEST TWICE DAILY    Type 2 diabetes, HbA1C goal < 8% (H)       blood glucose calibration solution    no brand specified    1 Bottle    1 drop by Test Solution route every 30 days    Type 2 diabetes, HbA1C goal < 8% (H)       * blood glucose monitoring lancets     102 Device    1 Device 2 times daily.    Type II or unspecified type diabetes mellitus without mention of complication, not stated as uncontrolled       * thin lancets    NO BRAND SPECIFIED    100 each    1 each 2 times daily. PLEASE DISPENSE PER PT INSURANCE PER PT PREFERENCE    Type II or unspecified type diabetes mellitus without mention of complication, not stated as uncontrolled       coenzyme Q-10 100 MG Caps      1 tab QD        EPINEPHrine 0.3 MG/0.3ML injection 2-pack    EPIPEN/ADRENACLICK/or ANY BX GENERIC EQUIV    0.6 mL    Inject 0.3 mLs (0.3 mg) into the muscle once as needed    Allergic state, sequela       gemfibrozil 600 MG tablet    LOPID    180 tablet    Take 1 tablet (600 mg) by mouth 2 times daily    Hyperlipidemia LDL goal <100       GLUCOSAMINE CHONDROITIN Tabs           lisinopril 20 MG tablet    PRINIVIL/ZESTRIL    90 tablet    TAKE 1 TABLET (20 MG) BY MOUTH DAILY    Essential hypertension, benign       Multi-vitamin Tabs tablet   Generic drug:  multivitamin, therapeutic with  minerals      1 tablet daily        Niacin 500 MG Tbcr     30    two daily        * Notice:  This list has 4 medication(s) that are the same as other medications prescribed for you. Read the directions carefully, and ask your doctor or other care provider to review them with you.

## 2018-10-17 NOTE — PROGRESS NOTES

## 2018-11-16 ENCOUNTER — RADIANT APPOINTMENT (OUTPATIENT)
Dept: GENERAL RADIOLOGY | Facility: CLINIC | Age: 73
End: 2018-11-16
Attending: FAMILY MEDICINE
Payer: MEDICARE

## 2018-11-16 ENCOUNTER — OFFICE VISIT (OUTPATIENT)
Dept: FAMILY MEDICINE | Facility: CLINIC | Age: 73
End: 2018-11-16
Payer: MEDICARE

## 2018-11-16 VITALS
DIASTOLIC BLOOD PRESSURE: 50 MMHG | WEIGHT: 171 LBS | HEART RATE: 62 BPM | SYSTOLIC BLOOD PRESSURE: 110 MMHG | TEMPERATURE: 98.2 F | BODY MASS INDEX: 28.49 KG/M2 | OXYGEN SATURATION: 98 % | HEIGHT: 65 IN | RESPIRATION RATE: 12 BRPM

## 2018-11-16 DIAGNOSIS — Z96.641 HISTORY OF ARTHROPLASTY OF RIGHT HIP: ICD-10-CM

## 2018-11-16 DIAGNOSIS — M79.651 PAIN OF RIGHT THIGH: ICD-10-CM

## 2018-11-16 DIAGNOSIS — M79.651 PAIN OF RIGHT THIGH: Primary | ICD-10-CM

## 2018-11-16 PROCEDURE — 99214 OFFICE O/P EST MOD 30 MIN: CPT | Performed by: FAMILY MEDICINE

## 2018-11-16 PROCEDURE — 73502 X-RAY EXAM HIP UNI 2-3 VIEWS: CPT

## 2018-11-16 NOTE — PROGRESS NOTES
SUBJECTIVE:   Sushant Zepeda is a 73 year old male who presents to clinic today for the following health issues:      Musculoskeletal problem/pain      Duration: week    Description  Location: R upper leg-dull ache, more walking sx get worse    Intensity:  moderate    Accompanying signs and symptoms: none    History  Previous similar problem: YES- R hip replacement 2002  Previous evaluation:  Not on leg    Precipitating or alleviating factors:-2 weeks ago back went out  Trauma or overuse: no   Aggravating factors include: walking and climbing stairs    Therapies tried and outcome: muscle relaxers-not sure if worked    Pain gets better when seated or laying down.  Worse when walking.  Patient threw his back out a couple weeks ago and has been going to the chiro for that, it feels almost completely better now.  Pain is located along his entire right anterior thigh.  He notes that the pain is particularly bad when he gets up from sitting.         Problem list and histories reviewed & adjusted, as indicated.  Additional history: as documented    Patient Active Problem List   Diagnosis     HYPERLIPIDEMIA LDL GOAL <100     BPH (benign prostatic hyperplasia)     Hypertension goal BP (blood pressure) < 140/90     Advanced directives, counseling/discussion     Health Care Home     Type 2 diabetes mellitus without complication (H)     Acute right-sided low back pain with right-sided sciatica     CKD (chronic kidney disease) stage 2, GFR 60-89 ml/min     Past Surgical History:   Procedure Laterality Date     C APPENDECTOMY       C TOTAL HIP ARTHROPLASTY      Hip Replacement, Total     COLONOSCOPY N/A 3/2/2017    Procedure: COMBINED COLONOSCOPY, SINGLE OR MULTIPLE BIOPSY/POLYPECTOMY BY BIOPSY;  Surgeon: Ru Deal MD;  Location: [facility]     HC REMOVE TONSILS/ADENOIDS,<11 Y/O      T & A <12y.o.       Social History   Substance Use Topics     Smoking status: Never Smoker     Smokeless tobacco: Never Used     Alcohol  "use 0.0 oz/week     0 Standard drinks or equivalent per week      Comment: occasioanlly     Family History   Problem Relation Age of Onset     Diabetes Mother      HEART DISEASE Mother      Eye Disorder Mother      Cerebrovascular Disease Father      Musculoskeletal Disorder Sister      Prostate Cancer No family hx of      Cancer - colorectal No family hx of            Reviewed and updated as needed this visit by clinical staff  Tobacco       Reviewed and updated as needed this visit by Provider         ROS:  Constitutional, HEENT, cardiovascular, pulmonary, gi and gu systems are negative, except as otherwise noted.    OBJECTIVE:     /50 (BP Location: Right arm, Patient Position: Chair, Cuff Size: Adult Regular)  Pulse 62  Temp 98.2  F (36.8  C) (Oral)  Resp 12  Ht 5' 5\" (1.651 m)  Wt 171 lb (77.6 kg)  SpO2 98%  BMI 28.46 kg/m2  Body mass index is 28.46 kg/(m^2).  GENERAL: healthy, alert and no distress  MS: No swelling or tenderness along the right anterior thigh.  No masses noted.  Normal ROM in the right hip with negative RHONDA testing.  No anterior hip joint pain on palpation.  SKIN: No overlying rashes or erythema    Diagnostic Test Results:  PELVIS AND RIGHT  HIP ONE VIEW   11/16/2018 12:13 PM      HISTORY: Right anterior thigh pain for the past week, history of hip  replacement in 2001.      COMPARISON: 12/21/2015.         IMPRESSION: There are postoperative changes of right total hip  arthroplasty. Hardware appears intact and well seated. No fracture is  visualized. There are mild degenerative changes in the left hip.     ORESTES MACKEY MD    ASSESSMENT/PLAN:     1. Pain of right thigh  2. History of arthroplasty of right hip  - Most likely pain from iliopsoas spasm given worsening pain when standing  - Advised doing regular hip flexor stretches and low back stretches (demonstrated in clinic)  - XR with no acute changes  - XR Pelvis and Hip Right 1 View; Future    Follow up in 2-4 weeks if pain " persists    Sanjuana Penaloza,   [facility]

## 2018-11-16 NOTE — MR AVS SNAPSHOT
"              After Visit Summary   11/16/2018    Sushant Zepeda    MRN: [id number]           Patient Information     Date Of Birth          1945        Visit Information        Provider Department      11/16/2018 11:20 AM Sanjuana Penaloza, DO [facility]        Today's Diagnoses     Pain of right thigh    -  1    History of arthroplasty of right hip           Follow-ups after your visit        Follow-up notes from your care team     Return in about 4 weeks (around 12/14/2018).      Who to contact     If you have questions or need follow up information about today's clinic visit or your schedule please contact [facility] directly at [phone].  Normal or non-critical lab and imaging results will be communicated to you by MyChart, letter or phone within 4 business days after the clinic has received the results. If you do not hear from us within 7 days, please contact the clinic through [facility] or phone. If you have a critical or abnormal lab result, we will notify you by phone as soon as possible.  Submit refill requests through [employer] or call your pharmacy and they will forward the refill request to us. Please allow 3 business days for your refill to be completed.          Additional Information About Your Visit        MyChart Information     [employer] gives you secure access to your electronic health record. If you see a primary care provider, you can also send messages to your care team and make appointments. If you have questions, please call your primary care clinic.  If you do not have a primary care provider, please call [phone] and they will assist you.        Care EveryWhere ID     This is your Care EveryWhere ID. This could be used by other organizations to access your [facility] medical records  [id number]        Your Vitals Were     Pulse Temperature Respirations Height Pulse Oximetry BMI (Body Mass Index)    62 98.2  F (36.8  C) (Oral) 12 5' 5\" (1.651 " m) 98% 28.46 kg/m2       Blood Pressure from Last 3 Encounters:   11/16/18 110/50   07/12/18 118/66   04/19/18 116/62    Weight from Last 3 Encounters:   11/16/18 171 lb (77.6 kg)   07/12/18 168 lb 8 oz (76.4 kg)   04/19/18 172 lb 6.4 oz (78.2 kg)               Primary Care Provider Office Phone # Fax #    Yfn David Baum [phone] [address] [facility] [address]        Equal Access to Services     [facility] : Hadii aad ku hadasho Soomaali, waaxda luqadaha, qaybta kaalmada adeegyada, milton reyes haysophien trang tenorio . So [facility] [phone].    ATENCIÓN: Si habla español, tiene a fernández disposición servicios gratuitos de asistencia lingüística. Llame al [phone].    We comply with applicable federal civil rights laws and [place] laws. We do not discriminate on the basis of race, color, national origin, age, disability, sex, sexual orientation, or gender identity.            Thank you!     Thank you for choosing [facility]  for your care. Our goal is always to provide you with excellent care. Hearing back from our patients is one way we can continue to improve our services. Please take a few minutes to complete the written survey that you may receive in the mail after your visit with us. Thank you!             Your Updated Medication List - Protect others around you: Learn how to safely use, store and throw away your medicines at www.disposemymeds.org.          This list is accurate as of 11/16/18 11:59 PM.  Always use your most recent med list.                   Brand Name Dispense Instructions for use Diagnosis    * ACCU-CHEK SAMANTHA W/DEVICE Kit     1    test twice daily    Type II or unspecified type diabetes mellitus without mention of complication, not stated as uncontrolled       * blood glucose monitoring meter device kit    no brand specified    1 kit    1 kit 2 times daily. DISPENSE PER PT INSURANCE PER PT PREFERENCE    Type II or  unspecified type diabetes mellitus without mention of complication, not stated as uncontrolled       amoxicillin 500 MG capsule    AMOXIL     TAKE 4 TABLETS BY MOUTH 1 HOUR BEFORE DENTAL APPOINTMENT    Type 2 diabetes mellitus without complication, without long-term current use of insulin (H)       atorvastatin 40 MG tablet    LIPITOR    90 tablet    TAKE ONE TABLET BY MOUTH ONE TIME DAILY    Hyperlipidemia LDL goal <100       RAHUL CONTOUR test strip   Generic drug:  blood glucose monitoring     300 strip    TEST TWICE DAILY    Type 2 diabetes, HbA1C goal < 8% (H)       blood glucose calibration solution    no brand specified    1 Bottle    1 drop by Test Solution route every 30 days    Type 2 diabetes, HbA1C goal < 8% (H)       * blood glucose monitoring lancets     102 Device    1 Device 2 times daily.    Type II or unspecified type diabetes mellitus without mention of complication, not stated as uncontrolled       * thin lancets    NO BRAND SPECIFIED    100 each    1 each 2 times daily. PLEASE DISPENSE PER PT INSURANCE PER PT PREFERENCE    Type II or unspecified type diabetes mellitus without mention of complication, not stated as uncontrolled       coenzyme Q-10 100 MG Caps      1 tab QD        EPINEPHrine 0.3 MG/0.3ML injection 2-pack    EPIPEN/ADRENACLICK/or ANY BX GENERIC EQUIV    0.6 mL    Inject 0.3 mLs (0.3 mg) into the muscle once as needed    Allergic state, sequela       gemfibrozil 600 MG tablet    LOPID    180 tablet    Take 1 tablet (600 mg) by mouth 2 times daily    Hyperlipidemia LDL goal <100       GLUCOSAMINE CHONDROITIN Tabs           lisinopril 20 MG tablet    PRINIVIL/ZESTRIL    90 tablet    TAKE 1 TABLET (20 MG) BY MOUTH DAILY    Essential hypertension, benign       Multi-vitamin Tabs tablet   Generic drug:  multivitamin, therapeutic with minerals      1 tablet daily        Niacin 500 MG Tbcr     30    two daily        * Notice:  This list has 4 medication(s) that are the same as other  medications prescribed for you. Read the directions carefully, and ask your doctor or other care provider to review them with you.

## 2018-11-29 ENCOUNTER — TRANSFERRED RECORDS (OUTPATIENT)
Dept: HEALTH INFORMATION MANAGEMENT | Facility: CLINIC | Age: 73
End: 2018-11-29

## 2018-12-03 ENCOUNTER — TRANSFERRED RECORDS (OUTPATIENT)
Dept: HEALTH INFORMATION MANAGEMENT | Facility: CLINIC | Age: 73
End: 2018-12-03

## 2018-12-03 ENCOUNTER — TELEPHONE (OUTPATIENT)
Dept: FAMILY MEDICINE | Facility: CLINIC | Age: 73
End: 2018-12-03

## 2018-12-03 DIAGNOSIS — N28.89 RENAL MASS: Primary | ICD-10-CM

## 2018-12-03 NOTE — TELEPHONE ENCOUNTER
Routed to TC, do you have results? See note below  Radha Frazier RN, BSN  Message handled by Nurse Triage.

## 2018-12-03 NOTE — TELEPHONE ENCOUNTER
Results ordered by Dr Castellanos, at [facility], pt wants your input, advise  Radha Frazier RN, BSN  Message handled by Nurse Triage.

## 2018-12-03 NOTE — TELEPHONE ENCOUNTER
Reason for call:  Other   Patient called regarding (reason for call): call back  Additional comments: Patient had an MRI scan that showed some concerns. Patient dropped off documents that show results. Patient is looking to see Dr. Baum as soon as possible to see what he needs to do for treatment. Dr. Baum is booked up for the week. Is it possible to work the patient into the schedule if his diagnosis is of concern?    Phone number to reach patient:  Home number on file [phone] (home)    Best Time:  any    Can we leave a detailed message on this number?  YES    Jaimie GAMA - TC/FD  12/3/2018 10:46 AM

## 2018-12-05 ENCOUNTER — HOSPITAL ENCOUNTER (OUTPATIENT)
Dept: ULTRASOUND IMAGING | Facility: CLINIC | Age: 73
Discharge: HOME | End: 2018-12-05
Attending: FAMILY MEDICINE | Admitting: FAMILY MEDICINE
Payer: MEDICARE

## 2018-12-05 DIAGNOSIS — N28.89 RENAL MASS: ICD-10-CM

## 2018-12-05 PROCEDURE — 76770 US EXAM ABDO BACK WALL COMP: CPT

## 2018-12-07 ENCOUNTER — TELEPHONE (OUTPATIENT)
Dept: FAMILY MEDICINE | Facility: CLINIC | Age: 73
End: 2018-12-07

## 2018-12-07 DIAGNOSIS — N28.1 RENAL CYST, LEFT: Primary | ICD-10-CM

## 2018-12-07 NOTE — TELEPHONE ENCOUNTER
Renal US results:  IMPRESSION:   1. A large complex cystic lesion or multiple adjacent cystic lesions  within the left kidney. This finding measures up to 13.7 cm, and is  new since 10/20/2003. CT is recommended for further characterization.  2. Large post void residual in the bladder.      He should get a CT scan to evaluate this further.  I'll put orders in.  Have him call radiology to schedule

## 2018-12-07 NOTE — TELEPHONE ENCOUNTER
Pt called TC requesting 12/5/18 renal US results.  Dr. Baum told him he would review right away.    Please advise.   Nacho Cummins RN

## 2018-12-07 NOTE — TELEPHONE ENCOUNTER
Called pt, informed, provided number to call if no call received may call [facility] scheduling number, FYI to MD Radha TRIANA, RN, BSN  Message handled by Nurse Triage.

## 2018-12-14 ENCOUNTER — HOSPITAL ENCOUNTER (OUTPATIENT)
Dept: CT IMAGING | Facility: CLINIC | Age: 73
Discharge: HOME | End: 2018-12-14
Attending: FAMILY MEDICINE | Admitting: FAMILY MEDICINE
Payer: MEDICARE

## 2018-12-14 DIAGNOSIS — N28.1 RENAL CYST, LEFT: ICD-10-CM

## 2018-12-14 LAB
CREAT BLD-MCNC: 1.4 MG/DL (ref 0.66–1.25)
GFR SERPL CREATININE-BSD FRML MDRD: 50 ML/MIN/1.7M2
RADIOLOGIST FLAGS: ABNORMAL

## 2018-12-14 PROCEDURE — 82565 ASSAY OF CREATININE: CPT

## 2018-12-14 PROCEDURE — 74177 CT ABD & PELVIS W/CONTRAST: CPT

## 2018-12-14 PROCEDURE — 25000128 H RX IP 250 OP 636: Performed by: RADIOLOGY

## 2018-12-14 RX ORDER — IOPAMIDOL 755 MG/ML
500 INJECTION, SOLUTION INTRAVASCULAR ONCE
Status: COMPLETED | OUTPATIENT
Start: 2018-12-14 | End: 2018-12-14

## 2018-12-14 RX ADMIN — IOPAMIDOL 86 ML: 755 INJECTION, SOLUTION INTRAVENOUS at 15:32

## 2018-12-14 RX ADMIN — SODIUM CHLORIDE 53 ML: 9 INJECTION, SOLUTION INTRAVENOUS at 15:32

## 2018-12-17 ENCOUNTER — TELEPHONE (OUTPATIENT)
Dept: FAMILY MEDICINE | Facility: CLINIC | Age: 73
End: 2018-12-17

## 2018-12-17 DIAGNOSIS — N13.8 BENIGN PROSTATIC HYPERPLASIA WITH URINARY OBSTRUCTION: ICD-10-CM

## 2018-12-17 DIAGNOSIS — N13.9 OBSTRUCTIVE UROPATHY: Primary | ICD-10-CM

## 2018-12-17 DIAGNOSIS — N40.1 BENIGN PROSTATIC HYPERPLASIA WITH URINARY OBSTRUCTION: ICD-10-CM

## 2018-12-17 NOTE — TELEPHONE ENCOUNTER
Pt has an appointment 12/20 @   3:30pm with [facility]: [facility] [phone].    Pt has been informed.    [facility]: [facility] [phone] [address]  [address]  [place], [place]    Barb-Referrals

## 2018-12-17 NOTE — TELEPHONE ENCOUNTER
Will refer to [facility] urology for management.  Referral is not urgent since he's asymptomatic, but I'd prefer if he could get in with them in the next week.  Barb can you help with this please?  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  Currently stable, but with hydronephrosis on CT scan

## 2018-12-17 NOTE — TELEPHONE ENCOUNTER
[facility] [phone]. Note to Dr. Penaloza    Notice of critical results CT Abd and Pelvis 12/14/18   Nacho Cummins RN

## 2018-12-17 NOTE — TELEPHONE ENCOUNTER
We called Sushant to let him know we are reviewing CT results today.  To help Dr. Stone make a decision for follow up,   Are you having any abdominal pain?   No abdominal pain, not all.   Last time urinated and about how much?   Urinated twice this morning. It seems to me like normal amounts.  I usually go twice in the mornings so I would say normal.    Nacho Cummins RN

## 2018-12-17 NOTE — TELEPHONE ENCOUNTER
Patient calling back.  Not having any symptoms.  Only pain is in back and leg and on and off and no pain currently.  Urination seems normal.  Went this morning when he got up and has gone another time since getting up and was normal amount.  Call him back at [phone].  Marcia Espinoza RN

## 2018-12-17 NOTE — TELEPHONE ENCOUNTER
Per Dr. Penaloza. Spoke with Sarika Baltazar.  Refer to [facility]: [facility] [place] [phone].  Please have Barb help to get him scheduled with them ASAP for obstructive uropathy.   Pt informed.  Call warm transferred to Barb in referrals.  Nacho Cummins RN

## 2018-12-20 DIAGNOSIS — N13.9 OBSTRUCTIVE UROPATHY: Primary | ICD-10-CM

## 2018-12-21 ENCOUNTER — OFFICE VISIT (OUTPATIENT)
Dept: UROLOGY | Facility: CLINIC | Age: 73
End: 2018-12-21
Attending: FAMILY MEDICINE
Payer: MEDICARE

## 2018-12-21 VITALS
SYSTOLIC BLOOD PRESSURE: 120 MMHG | DIASTOLIC BLOOD PRESSURE: 60 MMHG | BODY MASS INDEX: 27.32 KG/M2 | HEIGHT: 65 IN | WEIGHT: 164 LBS | OXYGEN SATURATION: 96 % | HEART RATE: 69 BPM

## 2018-12-21 DIAGNOSIS — N13.9 OBSTRUCTIVE UROPATHY: ICD-10-CM

## 2018-12-21 LAB
ALBUMIN UR-MCNC: NEGATIVE MG/DL
APPEARANCE UR: CLEAR
BILIRUB UR QL STRIP: NEGATIVE
COLOR UR AUTO: YELLOW
GLUCOSE UR STRIP-MCNC: NEGATIVE MG/DL
HGB UR QL STRIP: NEGATIVE
KETONES UR STRIP-MCNC: NEGATIVE MG/DL
LEUKOCYTE ESTERASE UR QL STRIP: ABNORMAL
NITRATE UR QL: NEGATIVE
PH UR STRIP: 6 PH (ref 5–7)
RESIDUAL VOLUME (RV) (EXTERNAL): NORMAL
SOURCE: ABNORMAL
SP GR UR STRIP: 1.01 (ref 1–1.03)
UROBILINOGEN UR STRIP-ACNC: 0.2 EU/DL (ref 0.2–1)

## 2018-12-21 PROCEDURE — 99202 OFFICE O/P NEW SF 15 MIN: CPT | Mod: 25 | Performed by: UROLOGY

## 2018-12-21 PROCEDURE — 81003 URINALYSIS AUTO W/O SCOPE: CPT | Performed by: UROLOGY

## 2018-12-21 PROCEDURE — 51798 US URINE CAPACITY MEASURE: CPT | Performed by: UROLOGY

## 2018-12-21 RX ORDER — CEFAZOLIN SODIUM 1 G
1 VIAL (EA) INJECTION SEE ADMIN INSTRUCTIONS
Status: CANCELLED | OUTPATIENT
Start: 2018-12-21

## 2018-12-21 ASSESSMENT — MIFFLIN-ST. JEOR: SCORE: 1415.78

## 2018-12-21 ASSESSMENT — PAIN SCALES - GENERAL: PAINLEVEL: NO PAIN (0)

## 2018-12-21 NOTE — NURSING NOTE
Chief Complaint   Patient presents with     Consult     New pt is here due obstructive uropathy.    PVR is 983 mL.   Cecily Valdez CMA

## 2018-12-21 NOTE — PROGRESS NOTES
Sushant Zepeda is a very pleasant 73-year-old male with left hydronephrosis and a distended bladder. He has noticed no flank pain or abdominal pain but occasional lower back spasms. He's had no dysuria, hematuria or urinary tract infections. He has a remote history of urolithiasis.  He has also noticed some slowing of his stream over the years. His postvoid residual today is over 900 ml. His CT scan shows left hydronephrosis with obscuring of the pelvic films because of a hip prosthesis.It seems the left ureter is dilated down to the bladder.  Other past medical history:Generalized osteoarthrosis, hearing loss, hypertension, abnormal glucose appendectomy, tonsillectomy, hip arthroplasty, Colonoscopy with polypectomy  Family history: No prostate cancer  Medications:Lipitor,coenzyme Q10, EpiPen, Lopid, glucosamine/chondroitin, lisinopril, niacin, multivitamin  Allergies: NSAIDs  Creatinine: 1.4  Exam: Alert and oriented, with spouse, normal vital signs, normal appearance. Normocephalic, normal respirations, neuro grossly intact. Normal sphincter tone, no rectal mass or impaction, benign feeling prostate, cannot feel seminal vesicles  Assessment: Left hydronephrosis with little remaining parenchyma, normal right kidney. Renal insufficiency.  Plan: Video cystoscopy, left retrograde ureteropyelogram,discussed  Procedure, alternatives and risks,, possible need for TUR P, possible need for long-term intermittent catheterizations rule out distal left ureteral tumor or left-sided bladder mass

## 2018-12-21 NOTE — LETTER
12/21/2018       RE: Sushant Zepeda  [address]  [facility] [address]     Dear Colleague,    Thank you for referring your patient, Sushant Zepeda, to the [facility] [place] at [facility]. Please see a copy of my visit note below.    Sushant Zepeda is a very pleasant 73-year-old male with left hydronephrosis and a distended bladder. He has noticed no flank pain or abdominal pain but occasional lower back spasms. He's had no dysuria, hematuria or urinary tract infections. He has a remote history of urolithiasis.  He has also noticed some slowing of his stream over the years. His postvoid residual today is over 900 ml. His CT scan shows left hydronephrosis with obscuring of the pelvic films because of a hip prosthesis.It seems the left ureter is dilated down to the bladder.  Other past medical history:Generalized osteoarthrosis, hearing loss, hypertension, abnormal glucose appendectomy, tonsillectomy, hip arthroplasty, Colonoscopy with polypectomy  Family history: No prostate cancer  Medications:Lipitor,coenzyme Q10, EpiPen, Lopid, glucosamine/chondroitin, lisinopril, niacin, multivitamin  Allergies: NSAIDs  Creatinine: 1.4  Exam: Alert and oriented, with spouse, normal vital signs, normal appearance. Normocephalic, normal respirations, neuro grossly intact. Normal sphincter tone, no rectal mass or impaction, benign feeling prostate, cannot feel seminal vesicles  Assessment: Left hydronephrosis with little remaining parenchyma, normal right kidney. Renal insufficiency.  Plan: Video cystoscopy, left retrograde ureteropyelogram,discussed  Procedure, alternatives and risks,, possible need for TUR P, possible need for long-term intermittent catheterizations rule out distal left ureteral tumor or left-sided bladder mass    Sincerely,    Addy Sofia MD

## 2018-12-31 ENCOUNTER — TRANSFERRED RECORDS (OUTPATIENT)
Dept: HEALTH INFORMATION MANAGEMENT | Facility: CLINIC | Age: 73
End: 2018-12-31

## 2019-01-03 ENCOUNTER — OFFICE VISIT (OUTPATIENT)
Dept: FAMILY MEDICINE | Facility: CLINIC | Age: 74
End: 2019-01-03
Payer: MEDICARE

## 2019-01-03 VITALS
DIASTOLIC BLOOD PRESSURE: 65 MMHG | OXYGEN SATURATION: 98 % | WEIGHT: 170 LBS | HEART RATE: 69 BPM | HEIGHT: 65 IN | TEMPERATURE: 98 F | SYSTOLIC BLOOD PRESSURE: 127 MMHG | BODY MASS INDEX: 28.32 KG/M2 | RESPIRATION RATE: 16 BRPM

## 2019-01-03 DIAGNOSIS — N18.4 CHRONIC KIDNEY DISEASE, STAGE IV (SEVERE) (H): ICD-10-CM

## 2019-01-03 DIAGNOSIS — N18.2 SECONDARY DIABETES MELLITUS WITH STAGE 2 CHRONIC KIDNEY DISEASE (H): ICD-10-CM

## 2019-01-03 DIAGNOSIS — E13.22 SECONDARY DIABETES MELLITUS WITH STAGE 2 CHRONIC KIDNEY DISEASE (H): ICD-10-CM

## 2019-01-03 DIAGNOSIS — Z01.818 PREOP GENERAL PHYSICAL EXAM: Primary | ICD-10-CM

## 2019-01-03 LAB
ALBUMIN UR-MCNC: NEGATIVE MG/DL
APPEARANCE UR: CLEAR
BILIRUB UR QL STRIP: NEGATIVE
COLOR UR AUTO: YELLOW
GLUCOSE UR STRIP-MCNC: NEGATIVE MG/DL
HBA1C MFR BLD: 6.7 % (ref 0–5.6)
HGB UR QL STRIP: NEGATIVE
KETONES UR STRIP-MCNC: NEGATIVE MG/DL
LEUKOCYTE ESTERASE UR QL STRIP: ABNORMAL
NITRATE UR QL: NEGATIVE
PH UR STRIP: 5.5 PH (ref 5–7)
RBC #/AREA URNS AUTO: NORMAL /HPF
SOURCE: ABNORMAL
SP GR UR STRIP: 1.01 (ref 1–1.03)
UROBILINOGEN UR STRIP-ACNC: 0.2 EU/DL (ref 0.2–1)
WBC #/AREA URNS AUTO: NORMAL /HPF

## 2019-01-03 PROCEDURE — 80048 BASIC METABOLIC PNL TOTAL CA: CPT | Performed by: FAMILY MEDICINE

## 2019-01-03 PROCEDURE — 99214 OFFICE O/P EST MOD 30 MIN: CPT | Performed by: FAMILY MEDICINE

## 2019-01-03 PROCEDURE — 84460 ALANINE AMINO (ALT) (SGPT): CPT | Performed by: FAMILY MEDICINE

## 2019-01-03 PROCEDURE — 81001 URINALYSIS AUTO W/SCOPE: CPT | Performed by: FAMILY MEDICINE

## 2019-01-03 PROCEDURE — 83036 HEMOGLOBIN GLYCOSYLATED A1C: CPT | Performed by: FAMILY MEDICINE

## 2019-01-03 PROCEDURE — 36415 COLL VENOUS BLD VENIPUNCTURE: CPT | Performed by: FAMILY MEDICINE

## 2019-01-03 SDOH — HEALTH STABILITY: MENTAL HEALTH: HOW OFTEN DO YOU HAVE A DRINK CONTAINING ALCOHOL?: NEVER

## 2019-01-03 ASSESSMENT — MIFFLIN-ST. JEOR: SCORE: 1442.99

## 2019-01-03 NOTE — PROGRESS NOTES
[facility]  [id number] [place] [id number]  [phone]  Dept: [phone]    PRE-OP EVALUATION:  Today's date: 1/3/2019    Sushant Zepeda (: 1945) presents for pre-operative evaluation assessment as requested by Dr. Addy Sofia.  He requires evaluation and anesthesia risk assessment prior to undergoing surgery/procedure for cystoscopy and TURP possible.    Fax number for surgical facility: [facility]  Primary Physician: Yfn Baum  Type of Anesthesia Anticipated: General    Patient has a Health Care Directive or Living Will:  NO    Preop Questions 2018   Who is doing your surgery? Dr Addy Sofia   What are you having done? They are looking inside my inlarged bladder to find out what is restricting urnine flow and what has caused the left kidney to quit working   Date of Surgery/Procedure: 2019   Facility or Hospital where procedure/surgery will be performed: [facility]   1.  Do you have a history of Heart attack, stroke, stent, coronary bypass surgery, or other heart surgery? No   2.  Do you ever have any pain or discomfort in your chest? No   3.  Do you have a history of  Heart Failure? No   4.   Are you troubled by shortness of breath when:  walking on a level surface, or up a slight hill, or at night? No   5.  Do you currently have a cold, bronchitis or other respiratory infection? No   6.  Do you have a cough, shortness of breath, or wheezing? No   7.  Do you sometimes get pains in the calves of your legs when you walk? No   8. Do you or anyone in your family have previous history of blood clots? No   9.  Do you or does anyone in your family have a serious bleeding problem such as prolonged bleeding following surgeries or cuts? No   10. Have you ever had problems with anemia or been told to take iron pills? No   11. Have you had any abnormal blood loss such as black, tarry or bloody stools? No   12. Have you ever had a  blood transfusion? No   13. Have you or any of your relatives ever had problems with anesthesia? No   14. Do you have sleep apnea, excessive snoring or daytime drowsiness? No   15. Do you have any prosthetic heart valves? No   16. Do you have prosthetic joints? YES - Rt hip replacement         HPI:     HPI related to upcoming procedure: pt has been having back pain that resulted CT abdomen that showed enlarged bladder and cyst on the kidney.      See problem list for active medical problems.  Problems all longstanding and stable, except as noted/documented.  See ROS for pertinent symptoms related to these conditions.                                                                                                                                                          .  DIABETES - Patient has a longstanding history of DiabetesType Type II . Patient is being treated with diet and denies significant side effects. Control has been good. Complicating factors include but are not limited to: hypertension.                                                                                                                            .  HYPERTENSION - Patient has longstanding history of HTN , currently denies any symptoms referable to elevated blood pressure. Specifically denies chest pain, palpitations, dyspnea, orthopnea, PND or peripheral edema. Blood pressure readings have been in normal range. Current medication regimen is as listed below. Patient denies any side effects of medication.                                                                                                                                                                                          .  RENAL INSUFFICIENCY - Patient has a longstanding history of mild chronic renal insufficiency. Last Cr 1.4.                                                                                                                                                                                .    MEDICAL HISTORY:     Patient Active Problem List    Diagnosis Date Noted     CKD (chronic kidney disease) stage 2, GFR 60-89 ml/min 03/07/2018     Priority: Medium     Acute right-sided low back pain with right-sided sciatica 06/27/2016     Priority: Medium     Type 2 diabetes mellitus without complication (H) 10/31/2015     Priority: Medium     Health Care Home 08/14/2013     Priority: Medium     Rita Neil RN-BSN, [facility]  [phone].  FPA / [facility]                    Hypertension goal BP (blood pressure) < 140/90 10/21/2011     Priority: Medium     Advanced directives, counseling/discussion 10/21/2011     Priority: Medium     Advance Directive Problem List Overview:   Name Relationship Phone    Primary Health Care Agent            Alternative Health Care Agent          Discussed advance care planning with patient; information given to patient to review. 10/21/2011          BPH (benign prostatic hyperplasia) 04/19/2011     Priority: Medium     HYPERLIPIDEMIA LDL GOAL <100 10/31/2010     Priority: Medium      Past Medical History:   Diagnosis Date     Abnormal glucose      Acute right-sided low back pain with right-sided sciatica 6/27/2016     Generalized osteoarthrosis, unspecified site      [place] (hard of hearing), bilateral      Mumps      Unspecified essential hypertension      Past Surgical History:   Procedure Laterality Date     C APPENDECTOMY       C TOTAL HIP ARTHROPLASTY      Hip Replacement, Total     COLONOSCOPY N/A 3/2/2017    Procedure: COMBINED COLONOSCOPY, SINGLE OR MULTIPLE BIOPSY/POLYPECTOMY BY BIOPSY;  Surgeon: Ru Deal MD;  Location:  GI     HC REMOVE TONSILS/ADENOIDS,<11 Y/O      T & A <12y.o.     Current Outpatient Medications   Medication Sig Dispense Refill     ACCU-CHEK SAMANTHA W/DEVICE KIT test twice daily 1 0     ACCU-CHEK FASTCLIX LANCETS MISC 1 Device 2 times daily. 102 Device 11     amoxicillin (AMOXIL) 500 MG  capsule TAKE 4 TABLETS BY MOUTH 1 HOUR BEFORE DENTAL APPOINTMENT  0     atorvastatin (LIPITOR) 40 MG tablet TAKE ONE TABLET BY MOUTH ONE TIME DAILY 90 tablet 3     RAHUL CONTOUR test strip TEST TWICE DAILY 300 strip 2     Blood Glucose Calibration (GLUCOSE CONTROL) solution 1 drop by Test Solution route every 30 days 1 Bottle prn     Blood Glucose Monitoring Suppl (BLOOD GLUCOSE METER) KIT 1 kit 2 times daily. DISPENSE PER PT INSURANCE PER PT PREFERENCE 1 kit 0     CO Q-10 100 MG OR CAPS 1 tab QD  0     EPINEPHrine (EPIPEN/ADRENACLICK/OR ANY BX GENERIC EQUIV) 0.3 MG/0.3ML injection 2-pack Inject 0.3 mLs (0.3 mg) into the muscle once as needed 0.6 mL 3     gemfibrozil (LOPID) 600 MG tablet Take 1 tablet (600 mg) by mouth 2 times daily 180 tablet 1     GLUCOSAMINE CHONDROITIN OR TABS   0     lancets thin MISC 1 each 2 times daily. PLEASE DISPENSE PER PT INSURANCE PER PT PREFERENCE 100 each 2     lisinopril (PRINIVIL/ZESTRIL) 20 MG tablet TAKE 1 TABLET (20 MG) BY MOUTH DAILY 90 tablet 3     MULTI-VITAMIN OR TABS 1 tablet daily       NIACIN 500 MG OR TBCR two daily 30 0     OTC products: Aspirin    Allergies   Allergen Reactions     Nsaids      aleve, ibuprofen     hives and swelling      Latex Allergy: NO    Social History     Tobacco Use     Smoking status: Never Smoker     Smokeless tobacco: Never Used   Substance Use Topics     Alcohol use: No     Alcohol/week: 0.0 oz     Frequency: Never     History   Drug Use No       REVIEW OF SYSTEMS:   CONSTITUTIONAL: NEGATIVE for fever, chills, change in weight  INTEGUMENTARY/SKIN: NEGATIVE for worrisome rashes, moles or lesions  EYES: NEGATIVE for vision changes or irritation  ENT/MOUTH: hearing loss.  RESP: NEGATIVE for significant cough or SOB  BREAST: NEGATIVE for masses, tenderness or discharge  CV: NEGATIVE for chest pain, palpitations or peripheral edema  GI: NEGATIVE for nausea, abdominal pain, heartburn, or change in bowel habits   male  ":frequency  MUSCULOSKELETAL:low back pain.  NEURO: NEGATIVE for weakness, dizziness or paresthesias  ENDOCRINE: NEGATIVE for temperature intolerance, skin/hair changes  HEME: NEGATIVE for bleeding problems  PSYCHIATRIC: NEGATIVE for changes in mood or affect    EXAM:   /65 (BP Location: Right arm, Patient Position: Chair, Cuff Size: Adult Regular)   Pulse 69   Temp 98  F (36.7  C) (Oral)   Resp 16   Ht 1.651 m (5' 5\")   Wt 77.1 kg (170 lb)   SpO2 98%   BMI 28.29 kg/m      GENERAL APPEARANCE: healthy, alert and no distress     EYES: EOMI,  PERRL     HENT: ear canals and TM's normal and nose and mouth without ulcers or lesions     NECK: no adenopathy, no asymmetry, masses, or scars and thyroid normal to palpation     RESP: lungs clear to auscultation - no rales, rhonchi or wheezes     CV: regular rates and rhythm, normal S1 S2, no S3 or S4 and no murmur, click or rub     ABDOMEN:  soft, nontender, no HSM or masses and bowel sounds normal     MS: gait normal     NEURO: Normal strength and tone, sensory exam grossly normal, mentation intact and speech normal     PSYCH: mentation appears normal. and affect normal/bright     LYMPHATICS: No cervical adenopathy    DIAGNOSTICS:     Labs Resulted Today:   Results for orders placed or performed in visit on 01/03/19   HEMOGLOBIN A1C   Result Value Ref Range    Hemoglobin A1C 6.7 (H) 0 - 5.6 %   *UA reflex to Microscopic and Culture ([facility] (except [place] and [place])   Result Value Ref Range    Color Urine Yellow     Appearance Urine Clear     Glucose Urine Negative NEG^Negative mg/dL    Bilirubin Urine Negative NEG^Negative    Ketones Urine Negative NEG^Negative mg/dL    Specific Gravity Urine 1.015 1.003 - 1.035    Blood Urine Negative NEG^Negative    pH Urine 5.5 5.0 - 7.0 pH    Protein Albumin Urine Negative NEG^Negative mg/dL    Urobilinogen Urine 0.2 0.2 - 1.0 EU/dL    Nitrite Urine Negative NEG^Negative    Leukocyte Esterase Urine " Trace (A) NEG^Negative    Source Midstream Urine    Urine Microscopic   Result Value Ref Range    WBC Urine 0 - 5 OTO5^0 - 5 /HPF    RBC Urine O - 2 OTO2^O - 2 /HPF     Labs Drawn and in Process:   Unresulted Labs Ordered in the Past 30 Days of this Admission     Date and Time Order Name Status Description    1/3/2019 1125 BASIC METABOLIC PANEL In process     1/3/2019 1125 ALT In process           Recent Labs   Lab Test 07/12/18  0859 03/07/18  0916  06/14/17  1043   NA  --  136  --  139   POTASSIUM  --  4.8  --  4.6   CR  --  1.22  --  1.06   A1C 6.3* 6.5*   < > 6.5*    < > = values in this interval not displayed.        IMPRESSION:   Reason for surgery/procedure: cystoscopy with possible TURP  Diagnosis/reason for consult: pre op    The proposed surgical procedure is considered INTERMEDIATE risk.    REVISED CARDIAC RISK INDEX  The patient has the following serious cardiovascular risks for perioperative complications such as (MI, PE, VFib and 3  AV Block):  No serious cardiac risks  INTERPRETATION: 0 risks: Class I (very low risk - 0.4% complication rate)    The patient has the following additional risks for perioperative complications:  No identified additional risks      ICD-10-CM    1. Preop general physical exam Z01.818        RECOMMENDATIONS:         --Patient is to take all scheduled medications on the day of surgery EXCEPT for modifications listed below.    ACE Inhibitor or Angiotensin Receptor Blocker (ARB) Use  Ace inhibitor or Angiotensin Receptor Blocker (ARB) and should HOLD this medication for the 24 hours prior to surgery.      APPROVAL GIVEN to proceed with proposed procedure, without further diagnostic evaluation       Signed Electronically by: Andrew Willis MD    Copy of this evaluation report is provided to requesting physician.    [place] Preop Guidelines    Revised Cardiac Risk Index

## 2019-01-04 LAB
ALT SERPL W P-5'-P-CCNC: 20 U/L (ref 0–70)
ANION GAP SERPL CALCULATED.3IONS-SCNC: 9 MMOL/L (ref 3–14)
BUN SERPL-MCNC: 31 MG/DL (ref 7–30)
CALCIUM SERPL-MCNC: 9.1 MG/DL (ref 8.5–10.1)
CHLORIDE SERPL-SCNC: 105 MMOL/L (ref 94–109)
CO2 SERPL-SCNC: 22 MMOL/L (ref 20–32)
CREAT SERPL-MCNC: 1.27 MG/DL (ref 0.66–1.25)
GFR SERPL CREATININE-BSD FRML MDRD: 55 ML/MIN/{1.73_M2}
GLUCOSE SERPL-MCNC: 134 MG/DL (ref 70–99)
POTASSIUM SERPL-SCNC: 4.3 MMOL/L (ref 3.4–5.3)
SODIUM SERPL-SCNC: 136 MMOL/L (ref 133–144)

## 2019-01-08 ENCOUNTER — APPOINTMENT (OUTPATIENT)
Dept: GENERAL RADIOLOGY | Facility: CLINIC | Age: 74
End: 2019-01-08
Attending: UROLOGY
Payer: MEDICARE

## 2019-01-08 ENCOUNTER — ANESTHESIA EVENT (OUTPATIENT)
Dept: SURGERY | Facility: CLINIC | Age: 74
End: 2019-01-08
Payer: MEDICARE

## 2019-01-08 ENCOUNTER — ANESTHESIA (OUTPATIENT)
Dept: SURGERY | Facility: CLINIC | Age: 74
End: 2019-01-08
Payer: MEDICARE

## 2019-01-08 ENCOUNTER — HOSPITAL ENCOUNTER (OUTPATIENT)
Facility: CLINIC | Age: 74
Discharge: HOME | End: 2019-01-08
Attending: UROLOGY | Admitting: UROLOGY
Payer: MEDICARE

## 2019-01-08 VITALS
TEMPERATURE: 97.4 F | SYSTOLIC BLOOD PRESSURE: 129 MMHG | WEIGHT: 170 LBS | BODY MASS INDEX: 28.32 KG/M2 | DIASTOLIC BLOOD PRESSURE: 85 MMHG | HEIGHT: 65 IN | OXYGEN SATURATION: 100 % | HEART RATE: 68 BPM | RESPIRATION RATE: 16 BRPM

## 2019-01-08 DIAGNOSIS — N13.9 OBSTRUCTIVE UROPATHY: ICD-10-CM

## 2019-01-08 LAB
GLUCOSE BLDC GLUCOMTR-MCNC: 112 MG/DL (ref 70–99)
GLUCOSE BLDC GLUCOMTR-MCNC: 90 MG/DL (ref 70–99)

## 2019-01-08 PROCEDURE — 71000027 ZZH RECOVERY PHASE 2 EACH 15 MINS: Performed by: UROLOGY

## 2019-01-08 PROCEDURE — 25500064 ZZH RX 255 OP 636: Performed by: UROLOGY

## 2019-01-08 PROCEDURE — 25000128 H RX IP 250 OP 636

## 2019-01-08 PROCEDURE — 82962 GLUCOSE BLOOD TEST: CPT | Mod: 91

## 2019-01-08 PROCEDURE — 25000128 H RX IP 250 OP 636: Performed by: ANESTHESIOLOGY

## 2019-01-08 PROCEDURE — 71000012 ZZH RECOVERY PHASE 1 LEVEL 1 FIRST HR: Performed by: UROLOGY

## 2019-01-08 PROCEDURE — 37000008 ZZH ANESTHESIA TECHNICAL FEE, 1ST 30 MIN: Performed by: UROLOGY

## 2019-01-08 PROCEDURE — 37000009 ZZH ANESTHESIA TECHNICAL FEE, EACH ADDTL 15 MIN: Performed by: UROLOGY

## 2019-01-08 PROCEDURE — 40000277 XR SURGERY CARM FLUORO LESS THAN 5 MIN W STILLS

## 2019-01-08 PROCEDURE — 36000056 ZZH SURGERY LEVEL 3 1ST 30 MIN: Performed by: UROLOGY

## 2019-01-08 PROCEDURE — 52000 CYSTOURETHROSCOPY: CPT | Performed by: UROLOGY

## 2019-01-08 PROCEDURE — 25000125 ZZHC RX 250: Performed by: ANESTHESIOLOGY

## 2019-01-08 PROCEDURE — 27210794 ZZH OR GENERAL SUPPLY STERILE: Performed by: UROLOGY

## 2019-01-08 PROCEDURE — 74420 UROGRAPHY RTRGR +-KUB: CPT | Mod: 26 | Performed by: UROLOGY

## 2019-01-08 PROCEDURE — 25000125 ZZHC RX 250

## 2019-01-08 PROCEDURE — 25000128 H RX IP 250 OP 636: Performed by: UROLOGY

## 2019-01-08 PROCEDURE — 40000306 ZZH STATISTIC PRE PROC ASSESS II: Performed by: UROLOGY

## 2019-01-08 PROCEDURE — C1769 GUIDE WIRE: HCPCS | Performed by: UROLOGY

## 2019-01-08 PROCEDURE — 25000125 ZZHC RX 250: Performed by: UROLOGY

## 2019-01-08 PROCEDURE — 36000058 ZZH SURGERY LEVEL 3 EA 15 ADDTL MIN: Performed by: UROLOGY

## 2019-01-08 RX ORDER — FENTANYL CITRATE 50 UG/ML
INJECTION, SOLUTION INTRAMUSCULAR; INTRAVENOUS PRN
Status: DISCONTINUED | OUTPATIENT
Start: 2019-01-08 | End: 2019-01-08

## 2019-01-08 RX ORDER — CIPROFLOXACIN 500 MG/1
500 TABLET, FILM COATED ORAL ONCE
Qty: 1 TABLET | Refills: 0 | Status: SHIPPED | OUTPATIENT
Start: 2019-01-08 | End: 2019-04-04

## 2019-01-08 RX ORDER — FENTANYL CITRATE 50 UG/ML
25-50 INJECTION, SOLUTION INTRAMUSCULAR; INTRAVENOUS
Status: DISCONTINUED | OUTPATIENT
Start: 2019-01-08 | End: 2019-01-08 | Stop reason: HOSPADM

## 2019-01-08 RX ORDER — ONDANSETRON 4 MG/1
4 TABLET, ORALLY DISINTEGRATING ORAL EVERY 30 MIN PRN
Status: DISCONTINUED | OUTPATIENT
Start: 2019-01-08 | End: 2019-01-08 | Stop reason: HOSPADM

## 2019-01-08 RX ORDER — ONDANSETRON 2 MG/ML
4 INJECTION INTRAMUSCULAR; INTRAVENOUS EVERY 30 MIN PRN
Status: DISCONTINUED | OUTPATIENT
Start: 2019-01-08 | End: 2019-01-08 | Stop reason: HOSPADM

## 2019-01-08 RX ORDER — PROPOFOL 10 MG/ML
INJECTION, EMULSION INTRAVENOUS PRN
Status: DISCONTINUED | OUTPATIENT
Start: 2019-01-08 | End: 2019-01-08

## 2019-01-08 RX ORDER — NALOXONE HYDROCHLORIDE 0.4 MG/ML
.1-.4 INJECTION, SOLUTION INTRAMUSCULAR; INTRAVENOUS; SUBCUTANEOUS
Status: DISCONTINUED | OUTPATIENT
Start: 2019-01-08 | End: 2019-01-08 | Stop reason: HOSPADM

## 2019-01-08 RX ORDER — METOPROLOL TARTRATE 1 MG/ML
1-2 INJECTION, SOLUTION INTRAVENOUS EVERY 5 MIN PRN
Status: DISCONTINUED | OUTPATIENT
Start: 2019-01-08 | End: 2019-01-08 | Stop reason: HOSPADM

## 2019-01-08 RX ORDER — MEPERIDINE HYDROCHLORIDE 25 MG/ML
12.5 INJECTION INTRAMUSCULAR; INTRAVENOUS; SUBCUTANEOUS
Status: DISCONTINUED | OUTPATIENT
Start: 2019-01-08 | End: 2019-01-08 | Stop reason: HOSPADM

## 2019-01-08 RX ORDER — SODIUM CHLORIDE, SODIUM LACTATE, POTASSIUM CHLORIDE, CALCIUM CHLORIDE 600; 310; 30; 20 MG/100ML; MG/100ML; MG/100ML; MG/100ML
INJECTION, SOLUTION INTRAVENOUS CONTINUOUS
Status: DISCONTINUED | OUTPATIENT
Start: 2019-01-08 | End: 2019-01-08 | Stop reason: HOSPADM

## 2019-01-08 RX ORDER — DIMENHYDRINATE 50 MG/ML
25 INJECTION, SOLUTION INTRAMUSCULAR; INTRAVENOUS
Status: DISCONTINUED | OUTPATIENT
Start: 2019-01-08 | End: 2019-01-08 | Stop reason: HOSPADM

## 2019-01-08 RX ORDER — IOPAMIDOL 612 MG/ML
INJECTION, SOLUTION INTRAVASCULAR PRN
Status: DISCONTINUED | OUTPATIENT
Start: 2019-01-08 | End: 2019-01-08 | Stop reason: HOSPADM

## 2019-01-08 RX ORDER — ALBUTEROL SULFATE 0.83 MG/ML
2.5 SOLUTION RESPIRATORY (INHALATION) EVERY 4 HOURS PRN
Status: DISCONTINUED | OUTPATIENT
Start: 2019-01-08 | End: 2019-01-08 | Stop reason: HOSPADM

## 2019-01-08 RX ORDER — CEFAZOLIN SODIUM 2 G/100ML
2 INJECTION, SOLUTION INTRAVENOUS
Status: COMPLETED | OUTPATIENT
Start: 2019-01-08 | End: 2019-01-08

## 2019-01-08 RX ORDER — GLYCOPYRROLATE 0.2 MG/ML
INJECTION, SOLUTION INTRAMUSCULAR; INTRAVENOUS PRN
Status: DISCONTINUED | OUTPATIENT
Start: 2019-01-08 | End: 2019-01-08

## 2019-01-08 RX ORDER — CEFAZOLIN SODIUM 1 G/3ML
1 INJECTION, POWDER, FOR SOLUTION INTRAMUSCULAR; INTRAVENOUS SEE ADMIN INSTRUCTIONS
Status: DISCONTINUED | OUTPATIENT
Start: 2019-01-08 | End: 2019-01-08 | Stop reason: HOSPADM

## 2019-01-08 RX ORDER — ONDANSETRON 2 MG/ML
INJECTION INTRAMUSCULAR; INTRAVENOUS PRN
Status: DISCONTINUED | OUTPATIENT
Start: 2019-01-08 | End: 2019-01-08

## 2019-01-08 RX ORDER — LIDOCAINE 40 MG/G
CREAM TOPICAL
Status: DISCONTINUED | OUTPATIENT
Start: 2019-01-08 | End: 2019-01-08 | Stop reason: HOSPADM

## 2019-01-08 RX ADMIN — PROPOFOL 150 MG: 10 INJECTION, EMULSION INTRAVENOUS at 15:03

## 2019-01-08 RX ADMIN — ONDANSETRON 4 MG: 2 INJECTION INTRAMUSCULAR; INTRAVENOUS at 15:14

## 2019-01-08 RX ADMIN — LIDOCAINE HYDROCHLORIDE 40 MG: 10 INJECTION, SOLUTION EPIDURAL; INFILTRATION; INTRACAUDAL; PERINEURAL at 15:03

## 2019-01-08 RX ADMIN — PHENYLEPHRINE HYDROCHLORIDE 100 MCG: 10 INJECTION, SOLUTION INTRAMUSCULAR; INTRAVENOUS; SUBCUTANEOUS at 15:13

## 2019-01-08 RX ADMIN — FENTANYL CITRATE 100 MCG: 50 INJECTION, SOLUTION INTRAMUSCULAR; INTRAVENOUS at 15:03

## 2019-01-08 RX ADMIN — SODIUM CHLORIDE, POTASSIUM CHLORIDE, SODIUM LACTATE AND CALCIUM CHLORIDE: 600; 310; 30; 20 INJECTION, SOLUTION INTRAVENOUS at 14:26

## 2019-01-08 RX ADMIN — GLYCOPYRROLATE 0.2 MG: 0.2 INJECTION, SOLUTION INTRAMUSCULAR; INTRAVENOUS at 15:14

## 2019-01-08 RX ADMIN — MIDAZOLAM 1 MG: 1 INJECTION INTRAMUSCULAR; INTRAVENOUS at 15:02

## 2019-01-08 RX ADMIN — MIDAZOLAM 1 MG: 1 INJECTION INTRAMUSCULAR; INTRAVENOUS at 15:00

## 2019-01-08 RX ADMIN — CEFAZOLIN SODIUM 2 G: 2 INJECTION, SOLUTION INTRAVENOUS at 14:55

## 2019-01-08 ASSESSMENT — MIFFLIN-ST. JEOR: SCORE: 1442.99

## 2019-01-08 NOTE — DISCHARGE INSTRUCTIONS
Hold glucosamine-chondroitin Tuesday & Wednesday night.  Resume on Thursday.  Take Cipro tonight with meal.    DR. CRISTHIAN MCCRACKEN M.D. CLINIC PHONE NUMBER:  [phone]      CYSTOSCOPY DISCHARGE INSTRUCTIONS    YOU MAY GO BACK TO YOUR NORMAL DIET AND ACTIVITY, UNLESS YOUR DOCTOR TELLS YOU NOT TO.    FOR THE NEXT TWO DAYS, YOU MAY NOTICE:    SOME BLOOD IN YOUR URINE.  SOME BURNING WHEN YOU URINATE (USE THE TOILET).  AN URGE TO URINATE MORE OFTEN.  BLADDER SPASMS.    THESE ARE NORMAL AFTER THE PROCEDURE.  THEY SHOULD GO AWAY AFTER A DAY OR TWO.  TO RELIEVE THESE PROBLEMS:     DRINK 6 TO 8 LARGE GLASSES OF WATER EACH DAY (INCLUDES DRINKS AT MEALS).  THIS WILL HELP CLEAR THE URINE.    TAKE WARM BATHS TO RELIEVE PAIN AND BLADDER SPASMS.  DO NOT ADD ANYTHING TO THE BATH WATER.    YOUR DOCTOR MAY PRESCRIBE PAIN MEDICINE.  YOU MAY ALSO TAKE TYLENOL (ACETAMINOPHEN) FOR PAIN.    CALL YOUR SURGEON IF YOU HAVE:    A FEVER OVER 100 DEGREES FOR MORE THAN A DAY.  CHECK YOUR TEMPERATURE UNDER YOUR TONGUE.    CHILLS.    FAILURE TO URINATE (NO URINE COMES OUT WHEN YOU TRY TO USE THE TOILET).  TRY SOAKING IN A BATHTUB FULL OF WARM WATER.  IF STILL NO URINE, CALL YOUR DOCTOR.    A LOT OF BLOOD IN THE URINE, OR BLOOD CLOTS LARGER THAN A NICKEL.      PAIN IN THE BACK OR BELLY AREA (ABDOMEN).    PAIN OR SPASMS THAT ARE NOT RELIEVED BY WARM TUB BATHS AND PAIN MEDICINE.      SEVERE PAIN, BURNING OR OTHER PROBLEMS WHILE PASSING URINE.    PAIN THAT GETS WORSE AFTER TWO DAYS.     GENERAL ANESTHESIA OR SEDATION ADULT DISCHARGE INSTRUCTIONS   SPECIAL PRECAUTIONS FOR 24 HOURS AFTER SURGERY    IT IS NOT UNUSUAL TO FEEL LIGHT-HEADED OR FAINT, UP TO 24 HOURS AFTER SURGERY OR WHILE TAKING PAIN MEDICATION.  IF YOU HAVE THESE SYMPTOMS; SIT FOR A FEW MINUTES BEFORE STANDING AND HAVE SOMEONE ASSIST YOU WHEN YOU GET UP TO WALK OR USE THE BATHROOM.    YOU SHOULD REST AND RELAX FOR THE NEXT 24 HOURS AND YOU MUST MAKE ARRANGEMENTS TO HAVE SOMEONE STAY WITH  YOU FOR AT LEAST 24 HOURS AFTER YOUR DISCHARGE.  AVOID HAZARDOUS AND STRENUOUS ACTIVITIES.  DO NOT MAKE IMPORTANT DECISIONS FOR 24 HOURS.    DO NOT DRIVE ANY VEHICLE OR OPERATE MECHANICAL EQUIPMENT FOR 24 HOURS FOLLOWING THE END OF YOUR SURGERY.  EVEN THOUGH YOU MAY FEEL NORMAL, YOUR REACTIONS MAY BE AFFECTED BY THE MEDICATION YOU HAVE RECEIVED.    DO NOT DRINK ALCOHOLIC BEVERAGES FOR 24 HOURS FOLLOWING YOUR SURGERY.    DRINK CLEAR LIQUIDS (APPLE JUICE, GINGER ALE, 7-UP, BROTH, ETC.).  PROGRESS TO YOUR REGULAR DIET AS YOU FEEL ABLE.    YOU MAY HAVE A DRY MOUTH, A SORE THROAT, MUSCLES ACHES OR TROUBLE SLEEPING.  THESE SHOULD GO AWAY AFTER 24 HOURS.    CALL YOUR DOCTOR FOR ANY OF THE FOLLOWING:  SIGNS OF INFECTION (FEVER, GROWING TENDERNESS AT THE SURGERY SITE, A LARGE AMOUNT OF DRAINAGE OR BLEEDING, SEVERE PAIN, FOUL-SMELLING DRAINAGE, REDNESS OR SWELLING.    IT HAS BEEN OVER 8 TO 10 HOURS SINCE SURGERY AND YOU ARE STILL NOT ABLE TO URINATE (PASS WATER).

## 2019-01-08 NOTE — ANESTHESIA POSTPROCEDURE EVALUATION
Patient: Sushant Zepeda    Procedure(s):  VIDEO CYSTOSCOPY, left retrogrades, eua prostate exam    Diagnosis:Enlarged prostate  Diagnosis Additional Information: Diagnosis: Left ureteropelvic junction obstruction,urinary retention  Procedure: EUA, video cystopanendoscopy,left retrograde ureteropyelogram        Anesthesia Type:  General    Note:  Anesthesia Post Evaluation    Patient location during evaluation: PACU  Patient participation: Able to fully participate in evaluation  Level of consciousness: awake  Pain management: adequate  Airway patency: patent  Cardiovascular status: acceptable  Respiratory status: acceptable  Hydration status: euvolemic  PONV: controlled     Anesthetic complications: None          Last vitals:  Vitals:    01/08/19 1213 01/08/19 1545   BP: 140/76 144/70   Pulse: 67 80   Resp:  18   Temp: 97.9  F (36.6  C) 98.1  F (36.7  C)   SpO2: 96% 100%         Electronically Signed By: Aristides Figueroa MD  January 8, 2019  4:21 PM

## 2019-01-08 NOTE — BRIEF OP NOTE
Diagnosis: Left ureteropelvic junction obstruction,urinary retention  Procedure: EUA, video cystopanendoscopy,left retrograde ureteropyelogram  Surgeon: Bismark  Anesthesia: Gen. Laryngeal mask  Estimated blood loss: 0 ml  Findings: Small prostate, not obstructive at the bladder neck Large postvoid residual with floppy bladder wall. Left retrograde shows normal left ureter with UPJ obstruction

## 2019-01-08 NOTE — ANESTHESIA PREPROCEDURE EVALUATION
Anesthesia Pre-Procedure Evaluation    Patient: Sushant Zepeda   MRN: [id number] : 1945          Preoperative Diagnosis: Enlarged prostate    Procedure(s):  VIDEO CYSTOSCOPY,POSSIBLE TRANSURETHRAL RESECTION (TUR) PROSTATE    Past Medical History:   Diagnosis Date     Abnormal glucose      Acute right-sided low back pain with right-sided sciatica 2016     Diabetes (H)      Generalized osteoarthrosis, unspecified site      [place] (hard of hearing), bilateral      Mumps      Renal disease      Unspecified essential hypertension      Past Surgical History:   Procedure Laterality Date     C APPENDECTOMY       C TOTAL HIP ARTHROPLASTY      Hip Replacement, Total     COLONOSCOPY N/A 3/2/2017    Procedure: COMBINED COLONOSCOPY, SINGLE OR MULTIPLE BIOPSY/POLYPECTOMY BY BIOPSY;  Surgeon: Ru Deal MD;  Location:  GI     HC REMOVE TONSILS/ADENOIDS,<13 Y/O      T & A <12y.o.     Anesthesia Evaluation     .             ROS/MED HX    ENT/Pulmonary:  - neg pulmonary ROS     Neurologic:  - neg neurologic ROS     Cardiovascular:     (+) hypertension----. : . . . :. .       METS/Exercise Tolerance:     Hematologic:  - neg hematologic  ROS       Musculoskeletal:  - neg musculoskeletal ROS       GI/Hepatic:  - neg GI/hepatic ROS       Renal/Genitourinary:     (+) chronic renal disease, type: CRI,       Endo:     (+) type II DM .      Psychiatric:  - neg psychiatric ROS       Infectious Disease:  - neg infectious disease ROS       Malignancy:         Other:    - neg other ROS                      Physical Exam  Normal systems: cardiovascular and pulmonary    Airway   Mallampati: II    Dental     Cardiovascular   Rhythm and rate: regular and normal      Pulmonary    breath sounds clear to auscultation            Lab Results   Component Value Date     2019    POTASSIUM 4.3 2019    CHLORIDE 105 2019    CO2 22 2019    BUN 31 (H) 2019    CR 1.27 (H) 2019     (H)  "01/03/2019    JIN 9.1 01/03/2019    ALBUMIN 4.5 03/07/2018    PROTTOTAL 7.8 03/07/2018    ALT 20 01/03/2019    AST 18 03/07/2018    ALKPHOS 93 03/07/2018    BILITOTAL 0.5 03/07/2018    TSH 1.32 06/14/2017       Preop Vitals  BP Readings from Last 3 Encounters:   01/08/19 140/76   01/03/19 127/65   12/21/18 120/60    Pulse Readings from Last 3 Encounters:   01/08/19 67   01/03/19 69   12/21/18 69      Resp Readings from Last 3 Encounters:   01/03/19 16   11/16/18 12   07/12/18 12    SpO2 Readings from Last 3 Encounters:   01/08/19 96%   01/03/19 98%   12/21/18 96%      Temp Readings from Last 1 Encounters:   01/08/19 97.9  F (36.6  C) (Temporal)    Ht Readings from Last 1 Encounters:   01/08/19 1.651 m (5' 5\")      Wt Readings from Last 1 Encounters:   01/08/19 77.1 kg (170 lb)    Estimated body mass index is 28.29 kg/m  as calculated from the following:    Height as of this encounter: 1.651 m (5' 5\").    Weight as of this encounter: 77.1 kg (170 lb).       Anesthesia Plan      History & Physical Review  History and physical reviewed and following examination; no interval change.    ASA Status:  3 .        Plan for General with Intravenous induction. Maintenance will be Inhalation and Balanced.    PONV prophylaxis:  Ondansetron (or other 5HT-3)       Postoperative Care  Postoperative pain management:  IV analgesics, Oral pain medications and Multi-modal analgesia.      Consents  Anesthetic plan, risks, benefits and alternatives discussed with:  Patient or representative..                 Papi Martin DO                    .  "

## 2019-01-08 NOTE — ANESTHESIA CARE TRANSFER NOTE
Patient: Sushant Zepeda    Procedure(s):  VIDEO CYSTOSCOPY, left retrogrades, eua prostate exam    Diagnosis: Enlarged prostate  Diagnosis Additional Information: No value filed.    Anesthesia Type:   General     Note:  Airway :Face Mask  Patient transferred to:PACU  Comments: Pt. To PACU, VSS, report to RN      Vitals: (Last set prior to Anesthesia Care Transfer)    CRNA VITALS  1/8/2019 1511 - 1/8/2019 1548      1/8/2019             Pulse:  78    SpO2:  95 %    Resp Rate (observed):  11                Electronically Signed By: LISSETH Juarez CRNA  January 8, 2019  3:48 PM

## 2019-01-09 NOTE — OP NOTE
Procedure Date: 01/08/2019      1st Assistant:     2nd Assistant:             PREOPERATIVE DIAGNOSIS:  Urinary retention with left hydronephrosis.       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       PROCEDURE:  Video cystopanendoscopy, left retrograde ureterogram and pyelogram, EUA.      SURGEON:   Addy Sofia Jr, MD      ANESTHESIA:  General laryngeal mask.      ESTIMATED BLOOD LOSS:  0 mL.      INDICATIONS:  The patient is a 73-year-old male who has had urinary retention and was found on recent CT scan to have significant left hydronephrosis.  The patient had a postvoid residual over 900 mL in my office.      PAST MEDICAL HISTORY:  Otherwise remarkable for osteoarthrosis, hypertension.  Creatinine is 1.4 per deciliter.  Rectal exam revealed a benign feeling prostate gland.  He now presents for video cystoscopy and left retrograde exam and possible TURP.  The procedure, alternatives, risks and follow-up were carefully discussed with the patient and his wife.      DESCRIPTION OF THE PROCEDURE:  The patient was given 2 grams of IV Ancef and taken to cystoscopy.  He was placed supine on the operating table and administered a general laryngeal mask anesthetic.  He was then placed in lithotomy position and his genitalia were prepped and draped in a sterile fashion.  A #22 Italian Storz cystoscope with 30 degree lens and video were visualized the urethra and bladder.  Water was used as an irrigant.  The urethra appeared normal and the prostatic fossa revealed small lateral lobes without visual obstruction of the prostatic fossa or bladder neck.  There was no middle lobe of the prostate and no high-riding bladder neck.  The bladder revealed a normal mucosa with a very floppy bladder wall.  The left ureter was identified and a retrograde ureteropyelogram was performed using an 8 cone tip catheter and half-strength contrast.  This showed some J hooking of the ureter  and a normal pelvic and abdominal ureter.  A could not get enough dye up so I then passed a Glidewire across the ureterovesical junction and passed a TigerTail catheter over the Glidewire into the mid left ureter and injected the ureter again and found that the patient had a UPJ obstruction.  I followed the Glidewire up into the left renal pelvis and passed the TigerTail catheter up into the left renal pelvis and aspirated much of the urine from the renal pelvis.  The Glidewire and a TigerTail catheter were removed.  The bladder was emptied and the cystoscope removed.  Rectal exam revealed no rectal mass and again a small benign feeling prostate gland.  Seminal vesicles were normal.  The patient tolerated the procedure well and went to the recovery room in stable condition.      He will be discharged on Cipro 500 mg x 1 this evening.  We will teach him intermittent self-catheterization as an outpatient next week and he will follow up with me in 4 weeks to review his postvoid residuals.  At this time, since his left UPJ obstruction is totally asymptomatic and he has lost almost 99% of his functioning parenchyma on the left side, he will not be offered a UPJ repair and as long as it remains asymptomatic, he will not need a nephrectomy.         CRISTHIAN MCCRACKEN JR, MD             D: 2019   T: 2019   MT: ROSS      Name:     KING NG   MRN:      [id number]        Account:        [id number]   :      1945           Procedure Date: 2019      Document: [id number]

## 2019-01-09 NOTE — OR NURSING
Dr. Sofia notified that the patient is unable to void. Bladder scan is 144cc and had multiple attempts to void. Dr. Sofia said that the patient has a history of a large post void residual and that his bladder can hold over 1000 liters of urine. He said that the patient does not need to void prior to discharging. Plan for post-op appointment to see Dr. Sofia regarding self catheterization. Discharge instructions reviewed with patient and spouse. Questions reviewed and understanding verbalized. Patient discharged home with wife.

## 2019-01-14 ENCOUNTER — OFFICE VISIT (OUTPATIENT)
Dept: UROLOGY | Facility: CLINIC | Age: 74
End: 2019-01-14
Payer: MEDICARE

## 2019-01-14 DIAGNOSIS — R33.9 URINARY RETENTION: Primary | ICD-10-CM

## 2019-01-14 PROCEDURE — 99211 OFF/OP EST MAY X REQ PHY/QHP: CPT

## 2019-01-14 NOTE — PROGRESS NOTES
Sushant Zepeda comes into clinic today at the request of Dr Sofia Ordering Provider for CIC teach.  This service provided today was under the supervising provider of the day Dr Locke, who was available if needed.    Sushant is here with wife to learn CIC per Dr Sofia. . Reviewed written  instructions and verbal instructions with patient and wife. Sushant was able to easily pass a 14 fr coude tip into the bladder for 2400 ml clear urine returned. He was  unable to pass a straight tip into the bladder due to BPH and his anatomy. He had excellent technique  And feels comfortable with process. He will pass at [facility].. Per Dr Sofia needs 3 times per  day.Sushant's urinary retention is not expected to be medically or surgically treated and resolved in the next  ninety days  Limited supplies given to patient and will order the Magic Cath3 14 fr Coude for home delvery  Tid.    Venessa Pop LPN

## 2019-01-28 ENCOUNTER — TELEPHONE (OUTPATIENT)
Dept: UROLOGY | Facility: CLINIC | Age: 74
End: 2019-01-28

## 2019-01-28 ENCOUNTER — ALLIED HEALTH/NURSE VISIT (OUTPATIENT)
Dept: UROLOGY | Facility: CLINIC | Age: 74
End: 2019-01-28
Payer: MEDICARE

## 2019-01-28 DIAGNOSIS — R33.9 URINARY RETENTION: Primary | ICD-10-CM

## 2019-01-28 DIAGNOSIS — N39.0 URINARY TRACT INFECTION: Primary | ICD-10-CM

## 2019-01-28 LAB
ALBUMIN UR-MCNC: 100 MG/DL
APPEARANCE UR: CLEAR
BILIRUB UR QL STRIP: NEGATIVE
COLOR UR AUTO: YELLOW
GLUCOSE UR STRIP-MCNC: NEGATIVE MG/DL
HGB UR QL STRIP: ABNORMAL
KETONES UR STRIP-MCNC: NEGATIVE MG/DL
LEUKOCYTE ESTERASE UR QL STRIP: ABNORMAL
NITRATE UR QL: NEGATIVE
PH UR STRIP: 6 PH (ref 5–7)
SOURCE: ABNORMAL
SP GR UR STRIP: 1.02 (ref 1–1.03)
UROBILINOGEN UR STRIP-ACNC: 0.2 EU/DL (ref 0.2–1)

## 2019-01-28 PROCEDURE — 87088 URINE BACTERIA CULTURE: CPT | Performed by: UROLOGY

## 2019-01-28 PROCEDURE — 81003 URINALYSIS AUTO W/O SCOPE: CPT | Performed by: UROLOGY

## 2019-01-28 PROCEDURE — 99211 OFF/OP EST MAY X REQ PHY/QHP: CPT

## 2019-01-28 PROCEDURE — 87186 SC STD MICRODIL/AGAR DIL: CPT | Performed by: UROLOGY

## 2019-01-28 PROCEDURE — 87086 URINE CULTURE/COLONY COUNT: CPT | Performed by: UROLOGY

## 2019-01-28 NOTE — TELEPHONE ENCOUNTER
His urine is cloudy and smells.  Coming in for nurse only appointment for UA/UC.  Derrell VELASQUEZ  [facility]  January 28, 2019 11:37 AM

## 2019-01-29 LAB
BACTERIA SPEC CULT: ABNORMAL
Lab: ABNORMAL
SPECIMEN SOURCE: ABNORMAL

## 2019-01-29 NOTE — NURSING NOTE
Sushant Zepeda comes into clinic today at the request of Dr. Sofia Ordering Provider for UA/UC cath specimen.    This service provided today was under the supervising provider of the day Dr. Fernandez, who was available if needed.    Shasta Boykin

## 2019-01-30 DIAGNOSIS — N39.0 URINARY TRACT INFECTION: Primary | ICD-10-CM

## 2019-01-30 RX ORDER — SULFAMETHOXAZOLE/TRIMETHOPRIM 800-160 MG
1 TABLET ORAL 2 TIMES DAILY
Qty: 20 TABLET | Refills: 2 | Status: SHIPPED | OUTPATIENT
Start: 2019-01-30 | End: 2019-04-04

## 2019-02-07 ENCOUNTER — OFFICE VISIT (OUTPATIENT)
Dept: UROLOGY | Facility: CLINIC | Age: 74
End: 2019-02-07
Payer: MEDICARE

## 2019-02-07 VITALS — OXYGEN SATURATION: 96 % | BODY MASS INDEX: 28.32 KG/M2 | WEIGHT: 170 LBS | HEIGHT: 65 IN | HEART RATE: 86 BPM

## 2019-02-07 DIAGNOSIS — R33.9 URINARY RETENTION: Primary | ICD-10-CM

## 2019-02-07 PROCEDURE — 99212 OFFICE O/P EST SF 10 MIN: CPT | Performed by: UROLOGY

## 2019-02-07 ASSESSMENT — PAIN SCALES - GENERAL: PAINLEVEL: NO PAIN (0)

## 2019-02-07 ASSESSMENT — MIFFLIN-ST. JEOR: SCORE: 1442.99

## 2019-02-07 NOTE — LETTER
2/7/2019       RE: Sushant Zepeda  [address]  [place] [facility] [address]     Dear Colleague,    Thank you for referring your patient, Sushant Zepeda, to the [facility] [place] [facility]. Please see a copy of my visit note below.    Sushant is a very pleasant 73-year-old male with an atonic bladder who does intermittent catheterizations.  He is catheterizing 3 times a day for volumes between 2 and 4 cups-he was given a calibrated vessel that he can measure in cc in the future.  He is having no trouble catheterizing and does void some.  He has had no blood.  He is finished a short course of Septra DS for urinary tract infection.  We discussed the difference between bacteriuria and a urinary tract infection this morning.  Other past medical history: Diabetes, right sciatica, osteoarthrosis, hearing loss, hypertension, video cystoscopy with retrogrades, colonoscopy, hip arthroplasty, tonsillectomy, appendectomy, non-smoker  Family history: No prostate cancer  Medications reviewed  Allergies: NSAIDs  Exam: Alert and oriented, normal appearance, normal respirations, neuro grossly intact  Assessment: Incomplete urinary retention due to detrusor decompensation  Plan: Continue intermittent catheterizations.  See me in the fall for urinalysis, urine culture and digital rectal exam.  No PSA unless palpable abnormality      Again, thank you for allowing me to participate in the care of your patient.      Sincerely,    Addy Sofia MD

## 2019-02-07 NOTE — NURSING NOTE
4 cups am  2-3 cups afternoon  2cups at bedtimes  Pt states output is clear and no pain with SIC.  Pt denies gross hem for a long time.  MCKENZIE Baca CMA

## 2019-02-07 NOTE — PROGRESS NOTES
Sushant is a very pleasant 73-year-old male with an atonic bladder who does intermittent catheterizations.  He is catheterizing 3 times a day for volumes between 2 and 4 cups-he was given a calibrated vessel that he can measure in cc in the future.  He is having no trouble catheterizing and does void some.  He has had no blood.  He is finished a short course of Septra DS for urinary tract infection.  We discussed the difference between bacteriuria and a urinary tract infection this morning.  Other past medical history: Diabetes, right sciatica, osteoarthrosis, hearing loss, hypertension, video cystoscopy with retrogrades, colonoscopy, hip arthroplasty, tonsillectomy, appendectomy, non-smoker  Family history: No prostate cancer  Medications reviewed  Allergies: NSAIDs  Exam: Alert and oriented, normal appearance, normal respirations, neuro grossly intact  Assessment: Incomplete urinary retention due to detrusor decompensation  Plan: Continue intermittent catheterizations.  See me in the fall for urinalysis, urine culture and digital rectal exam.  No PSA unless palpable abnormality

## 2019-03-18 DIAGNOSIS — I10 ESSENTIAL HYPERTENSION, BENIGN: ICD-10-CM

## 2019-03-18 DIAGNOSIS — E78.5 HYPERLIPIDEMIA LDL GOAL <100: ICD-10-CM

## 2019-03-19 RX ORDER — LISINOPRIL 20 MG/1
TABLET ORAL
Qty: 90 TABLET | Refills: 0 | Status: SHIPPED | OUTPATIENT
Start: 2019-03-19 | End: 2019-06-14

## 2019-03-19 RX ORDER — GEMFIBROZIL 600 MG/1
600 TABLET, FILM COATED ORAL 2 TIMES DAILY
Qty: 60 TABLET | Refills: 0 | Status: SHIPPED | OUTPATIENT
Start: 2019-03-19 | End: 2019-04-28

## 2019-03-19 RX ORDER — ATORVASTATIN CALCIUM 40 MG/1
TABLET, FILM COATED ORAL
Qty: 30 TABLET | Refills: 0 | Status: SHIPPED | OUTPATIENT
Start: 2019-03-19 | End: 2019-04-28

## 2019-03-19 NOTE — TELEPHONE ENCOUNTER
"atorvastatin (LIPITOR) 40 MG tablet  Last Written Prescription Date:  4/03/18  Last Fill Quantity: 90 tablet,  # refills: 3   Last office visit: 7/12/2018 with prescribing provider:  Sarika Tee Office Visit:      gemfibrozil (LOPID) 600 MG tablet  Last Written Prescription Date:  7/12/18  Last Fill Quantity: 180 tablet,  # refills: 1   Last office visit: 7/12/2018 with prescribing provider:  Sarika Tee Office Visit:      lisinopril (PRINIVIL/ZESTRIL) 20 MG tablet  Last Written Prescription Date:  6/27/18  Last Fill Quantity: 90 tablet,  # refills: 3   Last office visit: 7/12/2018 with prescribing provider:  Sarika Tee Office Visit:        Requested Prescriptions   Pending Prescriptions Disp Refills     atorvastatin (LIPITOR) 40 MG tablet [Pharmacy Med Name: ATORVASTATIN 40 MG TABLET] 90 tablet 3     Sig: TAKE ONE TABLET BY MOUTH ONE TIME DAILY    Statins Protocol Failed - 3/18/2019  8:37 PM       Failed - LDL on file in past 12 months    Recent Labs   Lab Test 03/07/18  0916   LDL 72            Passed - No abnormal creatine kinase in past 12 months    No lab results found.            Passed - Recent (12 mo) or future (30 days) visit within the authorizing provider's specialty    Patient had office visit in the last 12 months or has a visit in the next 30 days with authorizing provider or within the authorizing provider's specialty.  See \"Patient Info\" tab in inbasket, or \"Choose Columns\" in Meds & Orders section of the refill encounter.             Passed - Medication is active on med list       Passed - Patient is age 18 or older        gemfibrozil (LOPID) 600 MG tablet [Pharmacy Med Name: GEMFIBROZIL 600 MG TABLET] 180 tablet 1     Sig: TAKE 1 TABLET (600 MG) BY MOUTH 2 TIMES DAILY    Fibrates Failed - 3/18/2019  8:37 PM       Failed - Lipid panel on file in past 12 months    Recent Labs   Lab Test 03/07/18  0916  08/21/15  0959   CHOL 141   < > 140   TRIG 82   < > 91   HDL 53   < > 53   LDL 72   " "< > 69   NHDL 88   < >  --    VLDL  --   --  18   CHOLHDLRATIO  --   --  2.6    < > = values in this interval not displayed.              Passed - No abnormal creatine kinase in past 12 months    No lab results found.            Passed - Recent (12 mo) or future (30 days) visit within the authorizing provider's specialty    Patient had office visit in the last 12 months or has a visit in the next 30 days with authorizing provider or within the authorizing provider's specialty.  See \"Patient Info\" tab in inbasket, or \"Choose Columns\" in Meds & Orders section of the refill encounter.             Passed - Medication is active on med list       Passed - Patient is age 18 or older        lisinopril (PRINIVIL/ZESTRIL) 20 MG tablet [Pharmacy Med Name: LISINOPRIL 20 MG TABLET] 90 tablet 2     Sig: TAKE 1 TABLET (20 MG) BY MOUTH DAILY    ACE Inhibitors (Including Combos) Protocol Failed - 3/18/2019  8:37 PM       Failed - Normal serum creatinine on file in past 12 months    Recent Labs   Lab Test 01/03/19  1135 12/14/18  1525   CR 1.27*  --    CREAT  --  1.4*            Passed - Blood pressure under 140/90 in past 12 months    BP Readings from Last 3 Encounters:   01/08/19 129/85   01/03/19 127/65   12/21/18 120/60                Passed - Recent (12 mo) or future (30 days) visit within the authorizing provider's specialty    Patient had office visit in the last 12 months or has a visit in the next 30 days with authorizing provider or within the authorizing provider's specialty.  See \"Patient Info\" tab in inbasket, or \"Choose Columns\" in Meds & Orders section of the refill encounter.             Passed - Medication is active on med list       Passed - Patient is age 18 or older       Passed - Normal serum potassium on file in past 12 months    Recent Labs   Lab Test 01/03/19  1135   POTASSIUM 4.3               "

## 2019-03-19 NOTE — TELEPHONE ENCOUNTER
6 month fasting appointment due, pharmacy messages sent  Prescription approved per [facility] Refill Protocol.  Radha Frazier RN, BSN

## 2019-03-27 ENCOUNTER — TRANSFERRED RECORDS (OUTPATIENT)
Dept: HEALTH INFORMATION MANAGEMENT | Facility: CLINIC | Age: 74
End: 2019-03-27

## 2019-04-04 ENCOUNTER — OFFICE VISIT (OUTPATIENT)
Dept: FAMILY MEDICINE | Facility: CLINIC | Age: 74
End: 2019-04-04
Payer: MEDICARE

## 2019-04-04 VITALS
SYSTOLIC BLOOD PRESSURE: 119 MMHG | BODY MASS INDEX: 27.07 KG/M2 | OXYGEN SATURATION: 98 % | TEMPERATURE: 97.5 F | WEIGHT: 162.7 LBS | DIASTOLIC BLOOD PRESSURE: 66 MMHG | HEART RATE: 61 BPM | RESPIRATION RATE: 12 BRPM

## 2019-04-04 DIAGNOSIS — E11.9 TYPE 2 DIABETES MELLITUS WITHOUT COMPLICATION, WITHOUT LONG-TERM CURRENT USE OF INSULIN (H): Primary | ICD-10-CM

## 2019-04-04 DIAGNOSIS — N28.1 RENAL CYST: ICD-10-CM

## 2019-04-04 DIAGNOSIS — I70.90 ASVD (ARTERIOSCLEROTIC VASCULAR DISEASE): ICD-10-CM

## 2019-04-04 DIAGNOSIS — N31.2 ATONIC BLADDER: ICD-10-CM

## 2019-04-04 LAB — HBA1C MFR BLD: 6.3 % (ref 0–5.6)

## 2019-04-04 PROCEDURE — 99214 OFFICE O/P EST MOD 30 MIN: CPT | Performed by: FAMILY MEDICINE

## 2019-04-04 PROCEDURE — 82043 UR ALBUMIN QUANTITATIVE: CPT | Performed by: FAMILY MEDICINE

## 2019-04-04 PROCEDURE — 80061 LIPID PANEL: CPT | Performed by: FAMILY MEDICINE

## 2019-04-04 PROCEDURE — 36415 COLL VENOUS BLD VENIPUNCTURE: CPT | Performed by: FAMILY MEDICINE

## 2019-04-04 PROCEDURE — 80053 COMPREHEN METABOLIC PANEL: CPT | Performed by: FAMILY MEDICINE

## 2019-04-04 PROCEDURE — 83036 HEMOGLOBIN GLYCOSYLATED A1C: CPT | Performed by: FAMILY MEDICINE

## 2019-04-04 NOTE — PROGRESS NOTES
SUBJECTIVE:   Sushant Zepeda is a 73 year old male who presents to clinic today for the following health issues:      Patient is here for a medication check and fasting labs.  SUBJECTIVE:    CC: Sushant Zepeda is a 73 year old male who presents for cardiac status and asvd, new issues with prostate and renal cyst.  He has atonic bladder     HPI: feeling well, physically active, good energy         PROBLEM LIST:                   Patient Active Problem List   Diagnosis     HYPERLIPIDEMIA LDL GOAL <100     BPH (benign prostatic hyperplasia)     Hypertension goal BP (blood pressure) < 140/90     Advanced directives, counseling/discussion     Health Care Home     Type 2 diabetes mellitus without complication (H)     Acute right-sided low back pain with right-sided sciatica     CKD (chronic kidney disease) stage 2, GFR 60-89 ml/min     Chronic kidney disease, stage IV (severe) (H)     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)       PAST MEDICAL HISTORY:                  Past Medical History:   Diagnosis Date     Abnormal glucose      Acute right-sided low back pain with right-sided sciatica 6/27/2016     Diabetes (H)      Generalized osteoarthrosis, unspecified site      [place] (hard of hearing), bilateral      Mumps      Renal disease      Unspecified essential hypertension        PAST SURGICAL HISTORY:                  Past Surgical History:   Procedure Laterality Date     C APPENDECTOMY       C TOTAL HIP ARTHROPLASTY      Hip Replacement, Total     COLONOSCOPY N/A 3/2/2017    Procedure: COMBINED COLONOSCOPY, SINGLE OR MULTIPLE BIOPSY/POLYPECTOMY BY BIOPSY;  Surgeon: Ru Deal MD;  Location:  GI     CYSTOSCOPY, TRANSURETHRAL RESECTION (TUR) PROSTATE, COMBINED Left 1/8/2019    Procedure: Video cystopanendoscopy, left retrograde ureterogram and pyelogram, exam under anesthesia;  Surgeon: Addy Sofia MD;  Location:  OR      REMOVE TONSILS/ADENOIDS,<13 Y/O      T & A  <12y.o.       CURRENT MEDICATIONS:                  Current Outpatient Medications   Medication Sig Dispense Refill     atorvastatin (LIPITOR) 40 MG tablet TAKE ONE TABLET BY MOUTH ONE TIME DAILY 30 tablet 0     RAHUL CONTOUR test strip TEST TWICE DAILY 300 strip 2     EPINEPHrine (EPIPEN/ADRENACLICK/OR ANY BX GENERIC EQUIV) 0.3 MG/0.3ML injection 2-pack Inject 0.3 mLs (0.3 mg) into the muscle once as needed 0.6 mL 3     gemfibrozil (LOPID) 600 MG tablet TAKE 1 TABLET (600 MG) BY MOUTH 2 TIMES DAILY 60 tablet 0     lisinopril (PRINIVIL/ZESTRIL) 20 MG tablet TAKE 1 TABLET (20 MG) BY MOUTH DAILY 90 tablet 0     MULTI-VITAMIN OR TABS 1 tablet daily               FAMILY HISTORY:                   Family History   Problem Relation Age of Onset     Diabetes Mother      Heart Disease Mother      Eye Disorder Mother      Cerebrovascular Disease Father      Musculoskeletal Disorder Sister      Prostate Cancer No family hx of      Cancer - colorectal No family hx of        HEALTH MAINTENANCE:                    REVIEW OF OUTSIDE RECORDS: NO    REVIEW OF SYSTEMS:  CONSTITUTIONAL: NEGATIVE for fever, chills  EYES: NEGATIVE for vision changes   RESP: NEGATIVE for significant cough or SOB  CV: NEGATIVE for chest pain, palpitations   GI: NEGATIVE for nausea, abdominal pain, heartburn, or change in bowel habits  : NEGATIVE for frequency, dysuria, or hematuria  MUSCULOSKELETAL: NEGATIVE for significant arthralgias or myalgia  NEURO: NEGATIVE for weakness, dizziness or paresthesias or headache  NVS:no headache or balance issus  INTEG:no moles or new rashes  LYMPH:no nodes or night sweats    EXAM:    /66 (BP Location: Right arm, Patient Position: Chair, Cuff Size: Adult Large)   Pulse 61   Temp 97.5  F (36.4  C) (Oral)   Resp 12   Wt 73.8 kg (162 lb 11.2 oz)   SpO2 98%   BMI 27.07 kg/m    GENERAL APPEARANCE: healthy, alert and no distress   EXAM:  GENERAL APPEARANCE: healthy, alert and no distress  EYES: EOMI,   PERRL  HENT: ear canals and TM's normal and nose and mouth without ulcers or lesions  RESP: lungs clear to auscultation - no rales, rhonchi or wheezes  CV: regular rates and rhythm, normal S1 S2, no S3 or S4 and no murmur, click or rub -  ABDOMEN:  soft, nontender, no HSM or masses and bowel sounds normal  BACK:no tenderness or pain on straight let raise           ASSESSMENT/PLAN  (E11.9) Type 2 diabetes mellitus without complication, without long-term current use of insulin (H)  (primary encounter diagnosis)  Comment:   Plan: Hemoglobin A1c, Lipid panel reflex to direct         LDL Fasting, Comprehensive metabolic panel,         Albumin Random Urine Quantitative with Creat         Ratio        At goal    (I70.90) ASVD (arteriosclerotic vascular disease)  Comment:   Plan: Lipid panel reflex to direct LDL Fasting,         Comprehensive metabolic panel        No chest pain or sob    (N28.1) Renal cyst  Comment:   Plan: Lipid panel reflex to direct LDL Fasting,         Comprehensive metabolic panel        Stable image    (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   I have instructed the patient to call or schedule a follow-up appointment if any problems or failure to improve.

## 2019-04-05 LAB
ALBUMIN SERPL-MCNC: 4.5 G/DL (ref 3.4–5)
ALP SERPL-CCNC: 103 U/L (ref 40–150)
ALT SERPL W P-5'-P-CCNC: 16 U/L (ref 0–70)
ANION GAP SERPL CALCULATED.3IONS-SCNC: 4 MMOL/L (ref 3–14)
AST SERPL W P-5'-P-CCNC: 16 U/L (ref 0–45)
BILIRUB SERPL-MCNC: 0.4 MG/DL (ref 0.2–1.3)
BUN SERPL-MCNC: 37 MG/DL (ref 7–30)
CALCIUM SERPL-MCNC: 9.3 MG/DL (ref 8.5–10.1)
CHLORIDE SERPL-SCNC: 108 MMOL/L (ref 94–109)
CHOLEST SERPL-MCNC: 140 MG/DL
CO2 SERPL-SCNC: 25 MMOL/L (ref 20–32)
CREAT SERPL-MCNC: 1.13 MG/DL (ref 0.66–1.25)
CREAT UR-MCNC: 78 MG/DL
GFR SERPL CREATININE-BSD FRML MDRD: 64 ML/MIN/{1.73_M2}
GLUCOSE SERPL-MCNC: 107 MG/DL (ref 70–99)
HDLC SERPL-MCNC: 55 MG/DL
LDLC SERPL CALC-MCNC: 72 MG/DL
MICROALBUMIN UR-MCNC: 9 MG/L
MICROALBUMIN/CREAT UR: 12.05 MG/G CR (ref 0–17)
NONHDLC SERPL-MCNC: 85 MG/DL
POTASSIUM SERPL-SCNC: 4.8 MMOL/L (ref 3.4–5.3)
PROT SERPL-MCNC: 8.2 G/DL (ref 6.8–8.8)
SODIUM SERPL-SCNC: 137 MMOL/L (ref 133–144)
TRIGL SERPL-MCNC: 67 MG/DL

## 2019-04-28 DIAGNOSIS — E78.5 HYPERLIPIDEMIA LDL GOAL <100: ICD-10-CM

## 2019-04-29 NOTE — TELEPHONE ENCOUNTER
"Requested Prescriptions   Pending Prescriptions Disp Refills     atorvastatin (LIPITOR) 40 MG tablet [Pharmacy Med Name: ATORVASTATIN 40 MG TABLET]  Last Written Prescription Date:  3/19/2019  Last Fill Quantity: 30 tablet,  # refills: 0   Last office visit: 4/4/2019 with prescribing provider:  Sarika   Future Office Visit:     30 tablet 0     Sig: TAKE 1 TABLET BY MOUTH EVERY DAY       Statins Protocol Passed - 4/28/2019  8:38 PM        Passed - LDL on file in past 12 months     Recent Labs   Lab Test 04/04/19  1115   LDL 72             Passed - No abnormal creatine kinase in past 12 months     No lab results found.             Passed - Recent (12 mo) or future (30 days) visit within the authorizing provider's specialty     Patient had office visit in the last 12 months or has a visit in the next 30 days with authorizing provider or within the authorizing provider's specialty.  See \"Patient Info\" tab in inbasket, or \"Choose Columns\" in Meds & Orders section of the refill encounter.              Passed - Medication is active on med list        Passed - Patient is age 18 or older        gemfibrozil (LOPID) 600 MG tablet [Pharmacy Med Name: GEMFIBROZIL 600 MG TABLET]  Last Written Prescription Date:  3/19/2019  Last Fill Quantity: 60 tablet,  # refills: 0   Last office visit: 4/4/2019 with prescribing provider:  Sarika   Future Office Visit:     60 tablet 0     Sig: TAKE 1 TABLET (600 MG) BY MOUTH 2 TIMES DAILY       Fibrates Passed - 4/28/2019  8:38 PM        Passed - Lipid panel on file in past 12 months     Recent Labs   Lab Test 04/04/19  1115  08/21/15  0959   CHOL 140   < > 140   TRIG 67   < > 91   HDL 55   < > 53   LDL 72   < > 69   NHDL 85   < >  --    VLDL  --   --  18   CHOLHDLRATIO  --   --  2.6    < > = values in this interval not displayed.               Passed - No abnormal creatine kinase in past 12 months     No lab results found.             Passed - Recent (12 mo) or future (30 days) visit within " "the authorizing provider's specialty     Patient had office visit in the last 12 months or has a visit in the next 30 days with authorizing provider or within the authorizing provider's specialty.  See \"Patient Info\" tab in inbasket, or \"Choose Columns\" in Meds & Orders section of the refill encounter.              Passed - Medication is active on med list        Passed - Patient is age 18 or older          "

## 2019-04-30 RX ORDER — GEMFIBROZIL 600 MG/1
600 TABLET, FILM COATED ORAL 2 TIMES DAILY
Qty: 180 TABLET | Refills: 3 | Status: SHIPPED | OUTPATIENT
Start: 2019-04-30 | End: 2020-04-17

## 2019-04-30 RX ORDER — ATORVASTATIN CALCIUM 40 MG/1
TABLET, FILM COATED ORAL
Qty: 90 TABLET | Refills: 3 | Status: SHIPPED | OUTPATIENT
Start: 2019-04-30 | End: 2020-04-17

## 2019-04-30 NOTE — TELEPHONE ENCOUNTER
Prescription approved per [facility] [place]ill Protocol.  Radha Frazier RN, BSN

## 2019-05-01 ENCOUNTER — TELEPHONE (OUTPATIENT)
Dept: UROLOGY | Facility: CLINIC | Age: 74
End: 2019-05-01

## 2019-05-01 DIAGNOSIS — R82.90 CLOUDY URINE: ICD-10-CM

## 2019-05-01 DIAGNOSIS — R33.9 URINARY RETENTION: Primary | ICD-10-CM

## 2019-05-01 DIAGNOSIS — R33.9 URINARY RETENTION: ICD-10-CM

## 2019-05-01 LAB
ALBUMIN UR-MCNC: NEGATIVE MG/DL
APPEARANCE UR: CLEAR
BILIRUB UR QL STRIP: NEGATIVE
COLOR UR AUTO: YELLOW
GLUCOSE UR STRIP-MCNC: NEGATIVE MG/DL
HGB UR QL STRIP: NEGATIVE
KETONES UR STRIP-MCNC: NEGATIVE MG/DL
LEUKOCYTE ESTERASE UR QL STRIP: ABNORMAL
NITRATE UR QL: NEGATIVE
PH UR STRIP: 7 PH (ref 5–7)
SOURCE: ABNORMAL
SP GR UR STRIP: 1.01 (ref 1–1.03)
UROBILINOGEN UR STRIP-ACNC: 0.2 EU/DL (ref 0.2–1)

## 2019-05-01 PROCEDURE — 87086 URINE CULTURE/COLONY COUNT: CPT | Performed by: UROLOGY

## 2019-05-01 PROCEDURE — 81003 URINALYSIS AUTO W/O SCOPE: CPT | Performed by: UROLOGY

## 2019-05-01 PROCEDURE — 87186 SC STD MICRODIL/AGAR DIL: CPT | Performed by: UROLOGY

## 2019-05-01 PROCEDURE — 87088 URINE BACTERIA CULTURE: CPT | Performed by: UROLOGY

## 2019-05-01 NOTE — TELEPHONE ENCOUNTER
Sushant  Is concerned he rebeca have another UTI . He does CIC but has noticed a foul smell and cloudy urine with catherizations.He is wondering if he should begin the Bactrim DS he has at home. Suggested he do a cath culture today once urine collected he can begin the Bactrim DS .Venessa Pop LPN

## 2019-05-03 ENCOUNTER — TELEPHONE (OUTPATIENT)
Dept: UROLOGY | Facility: CLINIC | Age: 74
End: 2019-05-03

## 2019-05-03 LAB
BACTERIA SPEC CULT: ABNORMAL
SPECIMEN SOURCE: ABNORMAL

## 2019-05-03 NOTE — TELEPHONE ENCOUNTER
I called and gave him the information.  He has Septra at home.  Dr. Sofia had given him some refills.  Derrell VELASQUEZ  -[facility]  May 3, 2019 8:52 AM

## 2019-05-17 DIAGNOSIS — E11.9 TYPE 2 DIABETES, HBA1C GOAL < 8% (H): ICD-10-CM

## 2019-05-20 RX ORDER — CARVEDILOL 25 MG/1
TABLET, FILM COATED ORAL
Qty: 300 STRIP | Refills: 1 | Status: SHIPPED | OUTPATIENT
Start: 2019-05-20 | End: 2020-07-16

## 2019-05-20 NOTE — TELEPHONE ENCOUNTER
"Requested Prescriptions   Pending Prescriptions Disp Refills     CONTOUR TEST test strip [Pharmacy Med Name: CONTOUR TEST STRIP]  Last Written Prescription Date:  04/09/2018  Last Fill Quantity: 300 strip,  # refills: 2   Last Office Visit: 4/4/2019   Future Office Visit:      300 strip 1     Sig: TEST TWICE DAILY       Diabetic Supplies Protocol Passed - 5/17/2019  9:59 PM        Passed - Medication is active on med list        Passed - Patient is 18 years of age or older        Passed - Recent (6 mo) or future (30 days) visit within the authorizing provider's specialty     Patient had office visit in the last 6 months or has a visit in the next 30 days with authorizing provider.  See \"Patient Info\" tab in inbasket, or \"Choose Columns\" in Meds & Orders section of the refill encounter.              "

## 2019-05-20 NOTE — TELEPHONE ENCOUNTER
Prescription approved per [facility] [place].  Rand Johnson RN

## 2019-06-14 DIAGNOSIS — I10 ESSENTIAL HYPERTENSION, BENIGN: ICD-10-CM

## 2019-06-15 NOTE — TELEPHONE ENCOUNTER
"Requested Prescriptions   Pending Prescriptions Disp Refills     lisinopril (PRINIVIL/ZESTRIL) 20 MG tablet [Pharmacy Med Name: LISINOPRIL 20 MG TABLET] 90 tablet 0     Sig: TAKE 1 TABLET BY MOUTH EVERY DAY  Last Written Prescription Date:  03/19/2019  Last Fill Quantity: 90 tablet,  # refills: 0   Last Office Visit: 4/4/2019 Sarika  Future Office Visit:            ACE Inhibitors (Including Combos) Protocol Passed - 6/14/2019 10:56 PM        Passed - Blood pressure under 140/90 in past 12 months     BP Readings from Last 3 Encounters:   04/04/19 119/66   01/08/19 129/85   01/03/19 127/65                 Passed - Recent (12 mo) or future (30 days) visit within the authorizing provider's specialty     Patient had office visit in the last 12 months or has a visit in the next 30 days with authorizing provider or within the authorizing provider's specialty.  See \"Patient Info\" tab in inbasket, or \"Choose Columns\" in Meds & Orders section of the refill encounter.              Passed - Medication is active on med list        Passed - Patient is age 18 or older        Passed - Normal serum creatinine on file in past 12 months     Recent Labs   Lab Test 04/04/19  1115  12/14/18  1525   CR 1.13   < >  --    CREAT  --   --  1.4*    < > = values in this interval not displayed.             Passed - Normal serum potassium on file in past 12 months     Recent Labs   Lab Test 04/04/19  1115   POTASSIUM 4.8             "

## 2019-06-17 RX ORDER — LISINOPRIL 20 MG/1
TABLET ORAL
Qty: 90 TABLET | Refills: 0 | Status: SHIPPED | OUTPATIENT
Start: 2019-06-17 | End: 2019-11-01

## 2019-06-17 NOTE — TELEPHONE ENCOUNTER
Routing refill request to provider for review/approval because:  Love given x1 and patient did not follow up, please advise  Labs out of range:  Jg Bean RN, BSN

## 2019-07-12 ENCOUNTER — MYC MEDICAL ADVICE (OUTPATIENT)
Dept: FAMILY MEDICINE | Facility: CLINIC | Age: 74
End: 2019-07-12

## 2019-07-12 NOTE — LETTER
July 12, 2019      Sushant Zepeda  [address] [facility] [address]        To Whom It May Concern,       Sushant does not require antibiotic therapy prior to dental procedures.      Sincerely,        Andrew Willis MD

## 2019-07-12 NOTE — TELEPHONE ENCOUNTER
Iwhat's the reason for Abx prophylaxis? Does he have joint replacement?  Andrew Willis MD  [facility]  [phone]

## 2019-07-12 NOTE — TELEPHONE ENCOUNTER
Dr. Willis- see [employer] ([employer]) message below.  I see you did discontinue at preop but do not see reason he was taking premed.  Generic letter T'd up.  Marcia Espinoza RN      Date/Time Action Taken User Additional Information   02/10/17 0915 Historical Med Noted Andry Andrews    03/02/17 1008 Resume at Discharge Ru Deal MD    06/14/17 1020 Taking Flag Checked Andry Andrews    12/06/17 0955 Taking Flag Checked Andry Andrews    02/16/18 1512 Taking Flag Checked Alex Brower    03/07/18 0900 Taking Flag Checked Andry Andrews    04/19/18 0913 Taking Flag Checked Andry Andrews    07/12/18 0845 Taking Flag Checked Andry Andrews    11/16/18 1130 Taking Flag Checked Ayaka Andrea, [facility]    01/03/19 1107 Taking Flag Checked Zuri Chowdary, [facility]    01/03/19 1126 Taking Flag Unchecked Andrew Willsi MD    01/03/19 1126 Discontinue Andrew Willis MD    Outpatient Medication Detail      Disp Refills Start End KIMBERLY   amoxicillin (AMOXIL) 500 MG capsule (Discontinued)  0 7/30/2016 1/3/2019 --   Sig: TAKE 4 TABLETS BY MOUTH 1 HOUR BEFORE DENTAL APPOINTMENT   Class: Historical   Order: [id number]

## 2019-07-16 NOTE — TELEPHONE ENCOUNTER
Pt had Total Hip replacement surgery in 2002    Please print/sign letter if pt no longer needs prophylactic abx.     Thank you!  Trish MEEK RN

## 2019-07-29 ENCOUNTER — TRANSFERRED RECORDS (OUTPATIENT)
Dept: MULTI SPECIALTY CLINIC | Facility: CLINIC | Age: 74
End: 2019-07-29

## 2019-08-16 ENCOUNTER — ALLIED HEALTH/NURSE VISIT (OUTPATIENT)
Dept: NURSING | Facility: CLINIC | Age: 74
End: 2019-08-16
Payer: MEDICARE

## 2019-08-16 DIAGNOSIS — Z23 NEED FOR TDAP VACCINATION: Primary | ICD-10-CM

## 2019-08-16 PROCEDURE — 90471 IMMUNIZATION ADMIN: CPT

## 2019-08-16 PROCEDURE — 99207 ZZC NO CHARGE NURSE ONLY: CPT

## 2019-08-16 PROCEDURE — 90715 TDAP VACCINE 7 YRS/> IM: CPT

## 2019-10-02 ENCOUNTER — HEALTH MAINTENANCE LETTER (OUTPATIENT)
Age: 74
End: 2019-10-02

## 2019-10-09 DIAGNOSIS — R33.9 URINARY RETENTION: Primary | ICD-10-CM

## 2019-10-10 ENCOUNTER — OFFICE VISIT (OUTPATIENT)
Dept: UROLOGY | Facility: CLINIC | Age: 74
End: 2019-10-10
Payer: MEDICARE

## 2019-10-10 VITALS — OXYGEN SATURATION: 97 % | BODY MASS INDEX: 26.33 KG/M2 | HEART RATE: 68 BPM | HEIGHT: 65 IN | WEIGHT: 158 LBS

## 2019-10-10 DIAGNOSIS — R33.9 URINARY RETENTION: ICD-10-CM

## 2019-10-10 LAB
ALBUMIN UR-MCNC: NEGATIVE MG/DL
APPEARANCE UR: CLEAR
BILIRUB UR QL STRIP: NEGATIVE
COLOR UR AUTO: YELLOW
GLUCOSE UR STRIP-MCNC: NEGATIVE MG/DL
HGB UR QL STRIP: NEGATIVE
KETONES UR STRIP-MCNC: NEGATIVE MG/DL
LEUKOCYTE ESTERASE UR QL STRIP: NEGATIVE
NITRATE UR QL: NEGATIVE
PH UR STRIP: 5.5 PH (ref 5–7)
SOURCE: NORMAL
SP GR UR STRIP: 1.02 (ref 1–1.03)
UROBILINOGEN UR STRIP-ACNC: 0.2 EU/DL (ref 0.2–1)

## 2019-10-10 PROCEDURE — 99212 OFFICE O/P EST SF 10 MIN: CPT | Performed by: UROLOGY

## 2019-10-10 PROCEDURE — 81003 URINALYSIS AUTO W/O SCOPE: CPT | Performed by: UROLOGY

## 2019-10-10 ASSESSMENT — PAIN SCALES - GENERAL: PAINLEVEL: NO PAIN (0)

## 2019-10-10 ASSESSMENT — MIFFLIN-ST. JEOR: SCORE: 1383.56

## 2019-10-10 NOTE — NURSING NOTE
Pt here for Follow-up.  Pt does SIC  Pt states no trouble with SIC  Pt is getting his supplies ok.  Pt is doing SIC 4 times a day.

## 2019-10-10 NOTE — PROGRESS NOTES
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  He performs intermittent self-catheterization  He is here for urinalysis and for digital rectal exam today.  Other past medical history: Abnormal fasting glucose, back pain with right sciatica, diabetes, osteoarthrosis, hard of hearing, hypertension, non-smoker.  No family history of prostate cancer.  Cystoscopy done January 2019 but no TURP performed.  Left hip arthroplasty, appendectomy, tonsillectomy, colonoscopy  Medications: Aspirin, Lipitor, EpiPen, Lopid, lisinopril, multivitamin  Allergies: NSAIDs  Review of systems: No trouble passing catheters, no dysuria or hematuria.  No fevers or chills  Exam: Alert and oriented, normal appearance, normal vital signs.  Normal respirations, neuro grossly intact.  Normal sphincter tone, no rectal mass or impaction, small benign prostate, normal seminal vesicles  UA: normal  Assessment: Atonic bladder.  Continue intermittent self cath 3-4 times daily as needed.  Plan: See me yearly for urinalysis and TIKA.

## 2019-10-10 NOTE — NURSING NOTE
Pt will need to up his cath order if [facility] wants him to continue doing it 4 times a day.  MCKENZIE Baca CMA

## 2019-10-10 NOTE — LETTER
10/10/2019       RE: Sushant Zepeda  [address]  [facility] [address]     Dear Colleague,    Thank you for referring your patient, Sushant Zepeda, to the [facility] [place] [facility]. Please see a copy of my visit note below.    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  He performs intermittent self-catheterization  He is here for urinalysis and for digital rectal exam today.  Other past medical history: Abnormal fasting glucose, back pain with right sciatica, diabetes, osteoarthrosis, hard of hearing, hypertension, non-smoker.  No family history of prostate cancer.  Cystoscopy done January 2019 but no TURP performed.  Left hip arthroplasty, appendectomy, tonsillectomy, colonoscopy  Medications: Aspirin, Lipitor, EpiPen, Lopid, lisinopril, multivitamin  Allergies: NSAIDs  Review of systems: No trouble passing catheters, no dysuria or hematuria.  No fevers or chills  Exam: Alert and oriented, normal appearance, normal vital signs.  Normal respirations, neuro grossly intact.  Normal sphincter tone, no rectal mass or impaction, small benign prostate, normal seminal vesicles  UA: normal  Assessment: Atonic bladder.  Continue intermittent self cath 3-4 times daily as needed. Plan: See me yearly for urinalysis and TIKA.      Again, thank you for allowing me to participate in the care of your patient.      Sincerely,    Addy Sofia MD

## 2019-10-17 ENCOUNTER — MEDICAL CORRESPONDENCE (OUTPATIENT)
Dept: HEALTH INFORMATION MANAGEMENT | Facility: CLINIC | Age: 74
End: 2019-10-17

## 2019-10-23 ENCOUNTER — MEDICAL CORRESPONDENCE (OUTPATIENT)
Dept: HEALTH INFORMATION MANAGEMENT | Facility: CLINIC | Age: 74
End: 2019-10-23

## 2019-10-23 ENCOUNTER — TELEPHONE (OUTPATIENT)
Dept: UROLOGY | Facility: CLINIC | Age: 74
End: 2019-10-23

## 2019-10-23 NOTE — TELEPHONE ENCOUNTER
Orders for new quantity of catheters (90/30 days) signed and faxed back to [facility].   MARCUS Johnson RN       Health Call Center    Phone Message    May a detailed message be left on voicemail: yes    Reason for Call: Other: Anita from [facility] called and wanted to speak to Mira regarding the pt's prescription.  Anita said that pt will be cathing 3x a day so the prescription will need to be written for 90 per month.  Please call her back at [phone] to discuss this. Thanks     Action Taken: Message routed to:  [facility] urology clinic

## 2019-10-29 NOTE — PROGRESS NOTES
Pre-Visit Planning     Future Appointments   Date Time Provider Department Center   11/1/2019 10:25 AM Yfn Baum MD [facility] CR     Appointment Notes for this encounter:   Testing of A1C wants to come in fasted for Labs and Med Check    Questionnaires Reviewed/Assigned  Additional questionnaires assigned        Patient preferred phone number: [phone]    Unable to reach. Left voicemail. Advised patient to call clinic back at [phone].  Joy Aguila, ELDAN, RN, PHN

## 2019-10-31 ENCOUNTER — HEALTH MAINTENANCE LETTER (OUTPATIENT)
Age: 74
End: 2019-10-31

## 2019-11-01 ENCOUNTER — OFFICE VISIT (OUTPATIENT)
Dept: FAMILY MEDICINE | Facility: CLINIC | Age: 74
End: 2019-11-01
Payer: MEDICARE

## 2019-11-01 VITALS
BODY MASS INDEX: 27.26 KG/M2 | WEIGHT: 163.8 LBS | TEMPERATURE: 97.5 F | OXYGEN SATURATION: 99 % | HEART RATE: 57 BPM | SYSTOLIC BLOOD PRESSURE: 122 MMHG | DIASTOLIC BLOOD PRESSURE: 64 MMHG

## 2019-11-01 DIAGNOSIS — I10 ESSENTIAL HYPERTENSION, BENIGN: ICD-10-CM

## 2019-11-01 DIAGNOSIS — Z23 NEED FOR PROPHYLACTIC VACCINATION AND INOCULATION AGAINST INFLUENZA: ICD-10-CM

## 2019-11-01 DIAGNOSIS — E11.9 TYPE 2 DIABETES MELLITUS WITHOUT COMPLICATION, WITHOUT LONG-TERM CURRENT USE OF INSULIN (H): Primary | ICD-10-CM

## 2019-11-01 DIAGNOSIS — E78.5 HYPERLIPIDEMIA LDL GOAL <100: ICD-10-CM

## 2019-11-01 DIAGNOSIS — Z12.5 SCREENING FOR PROSTATE CANCER: ICD-10-CM

## 2019-11-01 LAB
ALBUMIN SERPL-MCNC: 4.4 G/DL (ref 3.4–5)
ALP SERPL-CCNC: 119 U/L (ref 40–150)
ALT SERPL W P-5'-P-CCNC: 22 U/L (ref 0–70)
ANION GAP SERPL CALCULATED.3IONS-SCNC: 9 MMOL/L (ref 3–14)
AST SERPL W P-5'-P-CCNC: 13 U/L (ref 0–45)
BILIRUB SERPL-MCNC: 0.5 MG/DL (ref 0.2–1.3)
BUN SERPL-MCNC: 28 MG/DL (ref 7–30)
CALCIUM SERPL-MCNC: 9 MG/DL (ref 8.5–10.1)
CHLORIDE SERPL-SCNC: 107 MMOL/L (ref 94–109)
CO2 SERPL-SCNC: 22 MMOL/L (ref 20–32)
CREAT SERPL-MCNC: 1.06 MG/DL (ref 0.66–1.25)
GFR SERPL CREATININE-BSD FRML MDRD: 69 ML/MIN/{1.73_M2}
GLUCOSE SERPL-MCNC: 115 MG/DL (ref 70–99)
HBA1C MFR BLD: 6.2 % (ref 0–5.6)
POTASSIUM SERPL-SCNC: 4.5 MMOL/L (ref 3.4–5.3)
PROT SERPL-MCNC: 7.8 G/DL (ref 6.8–8.8)
PSA SERPL-ACNC: 1.12 UG/L (ref 0–4)
SODIUM SERPL-SCNC: 138 MMOL/L (ref 133–144)
TSH SERPL DL<=0.005 MIU/L-ACNC: 1.24 MU/L (ref 0.4–4)

## 2019-11-01 PROCEDURE — 83036 HEMOGLOBIN GLYCOSYLATED A1C: CPT | Performed by: FAMILY MEDICINE

## 2019-11-01 PROCEDURE — G0103 PSA SCREENING: HCPCS | Performed by: FAMILY MEDICINE

## 2019-11-01 PROCEDURE — 99207 C FOOT EXAM  NO CHARGE: CPT | Mod: 25 | Performed by: FAMILY MEDICINE

## 2019-11-01 PROCEDURE — 36415 COLL VENOUS BLD VENIPUNCTURE: CPT | Performed by: FAMILY MEDICINE

## 2019-11-01 PROCEDURE — G0008 ADMIN INFLUENZA VIRUS VAC: HCPCS | Performed by: FAMILY MEDICINE

## 2019-11-01 PROCEDURE — 90662 IIV NO PRSV INCREASED AG IM: CPT | Performed by: FAMILY MEDICINE

## 2019-11-01 PROCEDURE — 99214 OFFICE O/P EST MOD 30 MIN: CPT | Mod: 25 | Performed by: FAMILY MEDICINE

## 2019-11-01 PROCEDURE — 84443 ASSAY THYROID STIM HORMONE: CPT | Performed by: FAMILY MEDICINE

## 2019-11-01 PROCEDURE — 80053 COMPREHEN METABOLIC PANEL: CPT | Performed by: FAMILY MEDICINE

## 2019-11-01 RX ORDER — LISINOPRIL 20 MG/1
20 TABLET ORAL DAILY
Qty: 90 TABLET | Refills: 0 | Status: SHIPPED | OUTPATIENT
Start: 2019-11-01 | End: 2020-02-28

## 2019-11-01 NOTE — PROGRESS NOTES
Subjective     Sushant Zepeda is a 74 year old male who presents to clinic today for the following health issues:    History of Present Illness        Diabetes:   He presents for follow up of diabetes.  He is checking home blood glucose one time daily. He checks blood glucose before meals.  Blood glucose is never over 200 and never under 70. He is aware of hypoglycemia symptoms including dizziness. He has no concerns regarding his diabetes at this time.  He is not experiencing numbness or burning in feet, excessive thirst, blurry vision, weight changes or redness, sores or blisters on feet. The patient has had a diabetic eye exam in the last 12 months. Eye exam performed on July 26, 2019.    Diabetes Management Resources    He eats 2-3 servings of fruits and vegetables daily.He consumes 1 sweetened beverage(s) daily.  He is taking medications regularly.     Patient is here for some fasting labs and a medication check.  Past Medical History:   Diagnosis Date     Abnormal glucose      Acute right-sided low back pain with right-sided sciatica 6/27/2016     Diabetes (H)      Generalized osteoarthrosis, unspecified site      [place] (hard of hearing), bilateral      Mumps      Renal disease      Unspecified essential hypertension        Past Surgical History:   Procedure Laterality Date     C APPENDECTOMY       C TOTAL HIP ARTHROPLASTY      Hip Replacement, Total     COLONOSCOPY N/A 3/2/2017    Procedure: COMBINED COLONOSCOPY, SINGLE OR MULTIPLE BIOPSY/POLYPECTOMY BY BIOPSY;  Surgeon: Ru Deal MD;  Location:  GI     CYSTOSCOPY, TRANSURETHRAL RESECTION (TUR) PROSTATE, COMBINED Left 1/8/2019    Procedure: Video cystopanendoscopy, left retrograde ureterogram and pyelogram, exam under anesthesia;  Surgeon: Addy Sofia MD;  Location:  OR      REMOVE TONSILS/ADENOIDS,<11 Y/O      T & A <12y.o.       Family History   Problem Relation Age of Onset     Diabetes Mother      Heart Disease Mother      Eye  Disorder Mother      Cerebrovascular Disease Father      Musculoskeletal Disorder Sister      Prostate Cancer No family hx of      Cancer - colorectal No family hx of        Social History     Tobacco Use     Smoking status: Never Smoker     Smokeless tobacco: Never Used   Substance Use Topics     Alcohol use: No     Alcohol/week: 0.0 standard drinks     Frequency: Never         BP Readings from Last 3 Encounters:   11/01/19 122/64   04/04/19 119/66   01/08/19 129/85    Wt Readings from Last 3 Encounters:   11/01/19 74.3 kg (163 lb 12.8 oz)   10/10/19 71.7 kg (158 lb)   04/04/19 73.8 kg (162 lb 11.2 oz)                    Reviewed and updated as needed this visit by Provider         Review of Systems   ROS COMP: Constitutional, HEENT, cardiovascular, pulmonary, GI, , musculoskeletal, neuro, skin, endocrine and psych systems are negative, except as otherwise noted.      Objective    There were no vitals taken for this visit.  There is no height or weight on file to calculate BMI.  Physical Exam   GENERAL: healthy, alert and no distress  EYES: Eyes grossly normal to inspection, PERRL and conjunctivae and sclerae normal  HENT: ear canals and TM's normal, nose and mouth without ulcers or lesions  NECK: no adenopathy, no asymmetry, masses, or scars and thyroid normal to palpation  RESP: lungs clear to auscultation - no rales, rhonchi or wheezes  CV: regular rate and rhythm, normal S1 S2, no S3 or S4, no murmur, click or rub, no peripheral edema and peripheral pulses strong  ABDOMEN: soft, nontender, no hepatosplenomegaly, no masses and bowel sounds normal  MS: no gross musculoskeletal defects noted, no edema  SKIN: no suspicious lesions or rashes  NEURO: Normal strength and tone, mentation intact and speech normal  PSYCH: mentation appears normal, affect normal/bright    Diagnostic Test Results:  Labs reviewed in Epic        Assessment & Plan     1. Type 2 diabetes mellitus without complication, without long-term  "current use of insulin (H)    - TSH WITH FREE T4 REFLEX  - HEMOGLOBIN A1C  - FOOT EXAM  - Comprehensive metabolic panel    2. Hyperlipidemia LDL goal <100    - Comprehensive metabolic panel    3. Screening for prostate cancer    - PSA, screen    4. Essential hypertension, benign    - lisinopril (PRINIVIL/ZESTRIL) 20 MG tablet; Take 1 tablet (20 mg) by mouth daily  Dispense: 90 tablet; Refill: 0    5. Need for prophylactic vaccination and inoculation against influenza    - INFLUENZA (HIGH DOSE) 3 VALENT VACCINE [61582]  - Vaccine Administration, Initial [11643]     BMI:   Estimated body mass index is 27.26 kg/m  as calculated from the following:    Height as of 10/10/19: 1.651 m (5' 5\").    Weight as of this encounter: 74.3 kg (163 lb 12.8 oz).           Work on weight loss modest   Regular exercise    Return in about 6 months (around 5/1/2020).    Yfn Baum MD  [facility]    "

## 2020-01-17 ENCOUNTER — MEDICAL CORRESPONDENCE (OUTPATIENT)
Dept: HEALTH INFORMATION MANAGEMENT | Facility: CLINIC | Age: 75
End: 2020-01-17

## 2020-01-22 ENCOUNTER — TELEPHONE (OUTPATIENT)
Dept: UROLOGY | Facility: CLINIC | Age: 75
End: 2020-01-22

## 2020-01-22 NOTE — TELEPHONE ENCOUNTER
[facility] Call Center    Phone Message    May a detailed message be left on voicemail: yes    Reason for Call: Other: Sushant calling to request a call back. He states [facility] has been sending some forms to Dr. Sofia to fill out for his catheters. They have not received anything yet. Please call pt back to discuss.      Action Taken: Message routed to:  [facility] ua uro

## 2020-01-22 NOTE — TELEPHONE ENCOUNTER
Health Call Center    Phone Message    May a detailed message be left on voicemail: yes    Reason for Call: Other: Yesi FLANAGAN calling to f/u with clinic at pt's request. Writer verified fax number for clinic; Yesi states she is needing the provider to sign and date the forms she will send now. She left her direct number for any questions/concerns. Please advise.    Action Taken: Message routed to:  Other: [facility]

## 2020-01-22 NOTE — TELEPHONE ENCOUNTER
Called patient and informed him we have not received any forms. Confirned our fax with patient and he will have them re- fax.Venessa Pop LPN

## 2020-01-22 NOTE — TELEPHONE ENCOUNTER
Form received . patinet informed  Dr Sofia is out of the office until next Tuesday..Venessa Pop LPN

## 2020-01-22 NOTE — TELEPHONE ENCOUNTER
[facility] Call Center    Phone Message    May a detailed message be left on voicemail: yes    Reason for Call: Other: Sushant calling to verify if the forms from [facility] were sent over. Sushant was told they were fxed over at 2:04 p.m. today (1/22/20). Please give Sushant a call back to verify at your earliest convenience.     Action Taken: [facility] Uro

## 2020-02-28 DIAGNOSIS — I10 ESSENTIAL HYPERTENSION, BENIGN: ICD-10-CM

## 2020-02-28 RX ORDER — LISINOPRIL 20 MG/1
20 TABLET ORAL DAILY
Qty: 90 TABLET | Refills: 2 | Status: SHIPPED | OUTPATIENT
Start: 2020-02-28 | End: 2020-10-06

## 2020-02-28 NOTE — TELEPHONE ENCOUNTER
Prescription approved per [facility] Refill Protocol.  Funmi Carrillo RN, BSN

## 2020-04-17 DIAGNOSIS — E78.5 HYPERLIPIDEMIA LDL GOAL <100: ICD-10-CM

## 2020-04-17 RX ORDER — ATORVASTATIN CALCIUM 40 MG/1
40 TABLET, FILM COATED ORAL DAILY
Qty: 90 TABLET | Refills: 3 | Status: SHIPPED | OUTPATIENT
Start: 2020-04-17 | End: 2021-03-29

## 2020-04-17 RX ORDER — GEMFIBROZIL 600 MG/1
600 TABLET, FILM COATED ORAL 2 TIMES DAILY
Qty: 180 TABLET | Refills: 3 | Status: SHIPPED | OUTPATIENT
Start: 2020-04-17 | End: 2021-03-29

## 2020-04-17 NOTE — TELEPHONE ENCOUNTER
Routing refill request to provider for review/approval because:  Labs not current:  LDL, lipid panel      Hector Knight RN, BSN, PHN

## 2020-04-17 NOTE — TELEPHONE ENCOUNTER
"Requested Prescriptions   Pending Prescriptions Disp Refills     atorvastatin (LIPITOR) 40 MG tablet 90 tablet 3     Sig: Take 1 tablet (40 mg) by mouth daily       Statins Protocol Failed - 4/17/2020  9:02 AM        Failed - LDL on file in past 12 months     Recent Labs   Lab Test 04/04/19  1115   LDL 72             Passed - No abnormal creatine kinase in past 12 months     No lab results found.             Passed - Recent (12 mo) or future (30 days) visit within the authorizing provider's specialty     Patient has had an office visit with the authorizing provider or a provider within the authorizing providers department within the previous 12 mos or has a future within next 30 days. See \"Patient Info\" tab in inbasket, or \"Choose Columns\" in Meds & Orders section of the refill encounter.              Passed - Medication is active on med list        Passed - Patient is age 18 or older           gemfibrozil (LOPID) 600 MG tablet 180 tablet 3     Sig: Take 1 tablet (600 mg) by mouth 2 times daily       Fibrates Failed - 4/17/2020  9:02 AM        Failed - Lipid panel on file in past 12 months     Recent Labs   Lab Test 04/04/19  1115  08/21/15  0959   CHOL 140   < > 140   TRIG 67   < > 91   HDL 55   < > 53   LDL 72   < > 69   NHDL 85   < >  --    VLDL  --   --  18   CHOLHDLRATIO  --   --  2.6    < > = values in this interval not displayed.               Passed - No abnormal creatine kinase in past 12 months     No lab results found.             Passed - Recent (12 mo) or future (30 days) visit within the authorizing provider's specialty     Patient has had an office visit with the authorizing provider or a provider within the authorizing providers department within the previous 12 mos or has a future within next 30 days. See \"Patient Info\" tab in inbasket, or \"Choose Columns\" in Meds & Orders section of the refill encounter.              Passed - Medication is active on med list        Passed - Patient is age 18 or " older

## 2020-05-08 ENCOUNTER — MYC MEDICAL ADVICE (OUTPATIENT)
Dept: FAMILY MEDICINE | Facility: CLINIC | Age: 75
End: 2020-05-08

## 2020-05-08 NOTE — TELEPHONE ENCOUNTER
See mychart message below.  Last visit 11/1/19.  6 month RTC advised so due around 5/1/20.  Last A1C 11/1/19.  Please advise on plan.  Marcia Espinoza RN

## 2020-07-15 DIAGNOSIS — E11.9 TYPE 2 DIABETES, HBA1C GOAL < 8% (H): ICD-10-CM

## 2020-07-16 RX ORDER — CARVEDILOL 25 MG/1
TABLET, FILM COATED ORAL
Qty: 100 STRIP | Refills: 5 | Status: SHIPPED | OUTPATIENT
Start: 2020-07-16 | End: 2020-07-23

## 2020-07-16 NOTE — TELEPHONE ENCOUNTER
Routing refill request to provider for review/approval because:  A break in medication    Alma Blevins RN on 7/16/2020 at 8:04 AM

## 2020-07-23 DIAGNOSIS — E11.9 TYPE 2 DIABETES, HBA1C GOAL < 8% (H): ICD-10-CM

## 2020-07-23 RX ORDER — CARVEDILOL 25 MG/1
TABLET, FILM COATED ORAL
Qty: 100 STRIP | Refills: 0 | Status: SHIPPED | OUTPATIENT
Start: 2020-07-23 | End: 2020-07-27

## 2020-07-23 NOTE — TELEPHONE ENCOUNTER
Prescription approved per [facility] Refill Protocol.  For one time fill please call to emigdio Johnson RN

## 2020-07-23 NOTE — TELEPHONE ENCOUNTER
Scheduled an appt for 08/26/2020 with Dr. LV Forrester/[facility]

## 2020-07-27 RX ORDER — CARVEDILOL 25 MG/1
1 TABLET, FILM COATED ORAL DAILY
Qty: 100 STRIP | Refills: 0 | Status: SHIPPED | OUTPATIENT
Start: 2020-07-27 | End: 2020-11-02

## 2020-07-27 NOTE — TELEPHONE ENCOUNTER
Lab Results   Component Value Date    A1C 6.2 11/01/2019    A1C 6.3 04/04/2019    A1C 6.7 01/03/2019    A1C 6.3 07/12/2018    A1C 6.5 03/07/2018     Fax from pharmacy, needs resent for daily dosing per Medicare, pt not on insulin or oral agent  Radha Frazier RN, BSN  Message handled by CLINIC NURSE.

## 2020-07-28 ENCOUNTER — TRANSFERRED RECORDS (OUTPATIENT)
Dept: MULTI SPECIALTY CLINIC | Facility: CLINIC | Age: 75
End: 2020-07-28

## 2020-07-28 LAB — RETINOPATHY: NORMAL

## 2020-08-03 ENCOUNTER — TELEPHONE (OUTPATIENT)
Dept: FAMILY MEDICINE | Facility: CLINIC | Age: 75
End: 2020-08-03

## 2020-08-03 NOTE — TELEPHONE ENCOUNTER
Fax from [facility], need MD signature for DM supplies, routed to ISAAC LAZO MD, please sign, give to TC to fax  Radha Frazier RN, BSN  Message handled by CLINIC NURSE.

## 2020-08-25 ENCOUNTER — MYC MEDICAL ADVICE (OUTPATIENT)
Dept: FAMILY MEDICINE | Facility: CLINIC | Age: 75
End: 2020-08-25

## 2020-08-25 NOTE — PROGRESS NOTES
"Pre-Visit Planning     Future Appointments   Date Time Provider Department Center   8/26/2020  9:00 AM Yfn Baum MD [facility] CR   11/12/2020  9:00 AM Time, Addy M, MD [facility]     Arrival Time for this Appointment:  8:40 AM   Appointment Notes for this encounter:   f/u meds, diabetes and fasting labs    Questionnaires Reviewed/Assigned  No additional questionnaires are needed    Last OV with provider  11/1/19  1. Type 2 diabetes mellitus without complication, without long-term current use of insulin (H)  - TSH WITH FREE T4 REFLEX  - HEMOGLOBIN A1C  - FOOT EXAM  - Comprehensive metabolic panel  2. Hyperlipidemia LDL goal <100  - Comprehensive metabolic panel  3. Screening for prostate cancer  - PSA, screen  4. Essential hypertension, benign  - lisinopril (PRINIVIL/ZESTRIL) 20 MG tablet; Take 1 tablet (20 mg) by mouth daily  Dispense: 90 tablet; Refill: 0  5. Need for prophylactic vaccination and inoculation against influenza  - INFLUENZA (HIGH DOSE) 3 VALENT VACCINE [01066]  - Vaccine Administration, Initial [39391]  BMI:   Estimated body mass index is 27.26 kg/m  as calculated from the following:    Height as of 10/10/19: 1.651 m (5' 5\").    Weight as of this encounter: 74.3 kg (163 lb 12.8 oz).   Work on weight loss modest   Regular exercise  Return in about 6 months (around 5/1/2020).    Imaging and Lab Review  PSA   Date Value Ref Range Status   11/01/2019 1.12 0 - 4 ug/L Final     Comment:     Assay Method:  Chemiluminescence using Siemens Vista analyzer     Last Comprehensive Metabolic Panel:  Sodium   Date Value Ref Range Status   11/01/2019 138 133 - 144 mmol/L Final     Potassium   Date Value Ref Range Status   11/01/2019 4.5 3.4 - 5.3 mmol/L Final     Chloride   Date Value Ref Range Status   11/01/2019 107 94 - 109 mmol/L Final     Carbon Dioxide   Date Value Ref Range Status   11/01/2019 22 20 - 32 mmol/L Final     Anion Gap   Date Value Ref Range Status   11/01/2019 9 3 - 14 " mmol/L Final     Glucose   Date Value Ref Range Status   11/01/2019 115 (H) 70 - 99 mg/dL Final     Comment:     Fasting specimen     Urea Nitrogen   Date Value Ref Range Status   11/01/2019 28 7 - 30 mg/dL Final     Creatinine   Date Value Ref Range Status   11/01/2019 1.06 0.66 - 1.25 mg/dL Final     GFR Estimate   Date Value Ref Range Status   11/01/2019 69 >60 mL/min/[1.73_m2] Final     Comment:     Non  GFR Calc  Starting 12/18/2018, serum creatinine based estimated GFR (eGFR) will be   calculated using the Chronic Kidney Disease Epidemiology Collaboration   (CKD-EPI) equation.       Calcium   Date Value Ref Range Status   11/01/2019 9.0 8.5 - 10.1 mg/dL Final     Bilirubin Total   Date Value Ref Range Status   11/01/2019 0.5 0.2 - 1.3 mg/dL Final     Alkaline Phosphatase   Date Value Ref Range Status   11/01/2019 119 40 - 150 U/L Final     ALT   Date Value Ref Range Status   11/01/2019 22 0 - 70 U/L Final     AST   Date Value Ref Range Status   11/01/2019 13 0 - 45 U/L Final     Lab Results   Component Value Date    A1C 6.2 11/01/2019    A1C 6.3 04/04/2019    A1C 6.7 01/03/2019    A1C 6.3 07/12/2018    A1C 6.5 03/07/2018     TSH   Date Value Ref Range Status   11/01/2019 1.24 0.40 - 4.00 mU/L Final       Patient preferred phone number: [phone]    Sent [employer] message ; patient calls back    Spoke to patient via phone. Are there any additional questions or concerns you'd like to review with your provider during your visit? No    Patient does not have additional questions or concerns.        Visit is not preventive.    Meds  Is there anything on your medication list that needs to be updated? No    Current Outpatient Medications   Medication     ASPIRIN NOT PRESCRIBED (INTENTIONAL)     atorvastatin (LIPITOR) 40 MG tablet     blood glucose (CONTOUR TEST) test strip     EPINEPHrine (ANY BX GENERIC EQUIV) 0.3 MG/0.3ML injection 2-pack     gemfibrozil (LOPID) 600 MG tablet     lisinopril  "(ZESTRIL) 20 MG tablet     MULTI-VITAMIN OR TABS     No current facility-administered medications for this visit.      Which pharmacy do you prefer to use for medications during this visit if needed? [facility]    Do you need refills on any of your medications? Yes: epipen    Health Maintenance Due   Topic Date Due     HEPATITIS B IMMUNIZATION (1 of 3 - Risk 3-dose series) 05/15/1964     ZOSTER IMMUNIZATION (2 of 3) 09/27/2013     PHQ-2  01/01/2020     LIPID  04/04/2020     MICROALBUMIN  04/04/2020     A1C  05/01/2020     ALT  05/01/2020     EYE EXAM  07/29/2020     INFLUENZA VACCINE (1) 09/01/2020     Patient is due for:  labs, shingrix  No appointment needed.    Fluencr  Patient is active on Fluencr.    Questionnaire Review   Offered information on completing questionnaires via Fluenc[employer].  Advised patient to arrive early in order to complete questionnaires.    Call Summary  \"Thank you for your time today.  If anything comes up before your appointment, please feel free to contact us at [phone].\"    ELDA JayN, RN, PHN  Answers for HPI/ROS submitted by the patient on 8/25/2020   Chronic problems general questions HPI Form  How many servings of fruits and vegetables do you eat daily?: 2-3  On average, how many sweetened beverages do you drink each day (Examples: soda, juice, sweet tea, etc.  Do NOT count diet or artificially sweetened beverages)?: 0  How many minutes a day do you exercise enough to make your heart beat faster?: 30 to 60  How many days a week do you exercise enough to make your heart beat faster?: 4  How many days per week do you miss taking your medication?: 0  Frequency of checking blood sugars:: one time daily  What time of day are you checking your blood sugars : before meals  Have you had any blood sugars above 200?: No  Have you had any blood sugars below 70?: No  Hypoglycemia symptoms:: shakiness, weakness  Diabetic concerns:: none  Paraesthesia present:: none of these " symptoms  Have you had a diabetic eye exam within the last year?: Yes  Date of last eye exam:: July 28, 2020  Where was this eye exam done?: [facility]

## 2020-08-26 ENCOUNTER — OFFICE VISIT (OUTPATIENT)
Dept: FAMILY MEDICINE | Facility: CLINIC | Age: 75
End: 2020-08-26
Payer: MEDICARE

## 2020-08-26 VITALS
WEIGHT: 165.1 LBS | HEART RATE: 65 BPM | RESPIRATION RATE: 15 BRPM | BODY MASS INDEX: 27.47 KG/M2 | DIASTOLIC BLOOD PRESSURE: 61 MMHG | OXYGEN SATURATION: 96 % | TEMPERATURE: 98.1 F | SYSTOLIC BLOOD PRESSURE: 110 MMHG

## 2020-08-26 DIAGNOSIS — I10 ESSENTIAL HYPERTENSION, BENIGN: ICD-10-CM

## 2020-08-26 DIAGNOSIS — T78.40XS ALLERGIC STATE, SEQUELA: ICD-10-CM

## 2020-08-26 DIAGNOSIS — E11.9 TYPE 2 DIABETES, HBA1C GOAL < 8% (H): Primary | ICD-10-CM

## 2020-08-26 DIAGNOSIS — Z23 ENCOUNTER FOR IMMUNIZATION: ICD-10-CM

## 2020-08-26 DIAGNOSIS — E11.9 TYPE 2 DIABETES, HBA1C GOAL < 7% (H): ICD-10-CM

## 2020-08-26 DIAGNOSIS — E78.5 HYPERLIPIDEMIA LDL GOAL <100: ICD-10-CM

## 2020-08-26 LAB
ALBUMIN SERPL-MCNC: 4.3 G/DL (ref 3.4–5)
ALP SERPL-CCNC: 121 U/L (ref 40–150)
ALT SERPL W P-5'-P-CCNC: 27 U/L (ref 0–70)
ANION GAP SERPL CALCULATED.3IONS-SCNC: 6 MMOL/L (ref 3–14)
AST SERPL W P-5'-P-CCNC: 17 U/L (ref 0–45)
BILIRUB SERPL-MCNC: 0.4 MG/DL (ref 0.2–1.3)
BUN SERPL-MCNC: 30 MG/DL (ref 7–30)
CALCIUM SERPL-MCNC: 9.5 MG/DL (ref 8.5–10.1)
CHLORIDE SERPL-SCNC: 109 MMOL/L (ref 94–109)
CHOLEST SERPL-MCNC: 137 MG/DL
CO2 SERPL-SCNC: 22 MMOL/L (ref 20–32)
CREAT SERPL-MCNC: 1.13 MG/DL (ref 0.66–1.25)
CREAT UR-MCNC: 55 MG/DL
GFR SERPL CREATININE-BSD FRML MDRD: 63 ML/MIN/{1.73_M2}
GLUCOSE SERPL-MCNC: 112 MG/DL (ref 70–99)
HBA1C MFR BLD: 6.7 % (ref 0–5.6)
HDLC SERPL-MCNC: 54 MG/DL
LDLC SERPL CALC-MCNC: 68 MG/DL
MICROALBUMIN UR-MCNC: 5 MG/L
MICROALBUMIN/CREAT UR: 9.76 MG/G CR (ref 0–17)
NONHDLC SERPL-MCNC: 83 MG/DL
POTASSIUM SERPL-SCNC: 5 MMOL/L (ref 3.4–5.3)
PROT SERPL-MCNC: 8.2 G/DL (ref 6.8–8.8)
SODIUM SERPL-SCNC: 137 MMOL/L (ref 133–144)
TRIGL SERPL-MCNC: 73 MG/DL

## 2020-08-26 PROCEDURE — 80061 LIPID PANEL: CPT | Performed by: FAMILY MEDICINE

## 2020-08-26 PROCEDURE — 80053 COMPREHEN METABOLIC PANEL: CPT | Performed by: FAMILY MEDICINE

## 2020-08-26 PROCEDURE — 36415 COLL VENOUS BLD VENIPUNCTURE: CPT | Performed by: FAMILY MEDICINE

## 2020-08-26 PROCEDURE — 82043 UR ALBUMIN QUANTITATIVE: CPT | Performed by: FAMILY MEDICINE

## 2020-08-26 PROCEDURE — 83036 HEMOGLOBIN GLYCOSYLATED A1C: CPT | Performed by: FAMILY MEDICINE

## 2020-08-26 PROCEDURE — 90750 HZV VACC RECOMBINANT IM: CPT | Performed by: FAMILY MEDICINE

## 2020-08-26 PROCEDURE — 90471 IMMUNIZATION ADMIN: CPT | Performed by: FAMILY MEDICINE

## 2020-08-26 RX ORDER — EPINEPHRINE 0.3 MG/.3ML
0.3 INJECTION SUBCUTANEOUS
Qty: 0.6 ML | Refills: 3 | Status: SHIPPED | OUTPATIENT
Start: 2020-08-26 | End: 2022-01-14

## 2020-08-26 SDOH — HEALTH STABILITY: MENTAL HEALTH: HOW OFTEN DO YOU HAVE A DRINK CONTAINING ALCOHOL?: NOT ASKED

## 2020-08-26 NOTE — PROGRESS NOTES
Subjective     Sushant Zepeda is a 75 year old male who presents to clinic today for the following health issues:    History of Present Illness        Diabetes:   He presents for follow up of diabetes.  He is checking home blood glucose one time daily. He checks blood glucose before meals.  Blood glucose is never over 200 and never under 70. He is aware of hypoglycemia symptoms including shakiness and weakness. He has no concerns regarding his diabetes at this time.  He is not experiencing numbness or burning in feet, excessive thirst, blurry vision, weight changes or redness, sores or blisters on feet. The patient has had a diabetic eye exam in the last 12 months. Eye exam performed on July 28, 2020. Location of last eye exam [facility].        He eats 2-3 servings of fruits and vegetables daily.He consumes 0 sweetened beverage(s) daily.He exercises with enough effort to increase his heart rate 30 to 60 minutes per day.  He exercises with enough effort to increase his heart rate 4 days per week.   He is taking medications regularly.         He has past history of asvd     How are you feeling today? Better  In the past 24 hours have you had shortness of breath when speaking, walking, or climbing stairs? I don't have breathing problems  Do you have a cough? I don't have a cough  When is the last time you had a fever greater than 100? 10 years   Are you having any other symptoms? None   Do you have any other stressors you would like to discuss with your provider? Health Concerns and OTHER: questions about zoster     PHQ Assesment Total Score(s) 8/25/2020 8/26/2020   PHQ-2 Score 0 0   Some recent data might be hidden         Review of Systems   Constitutional, HEENT, cardiovascular, pulmonary, GI, , musculoskeletal, neuro, skin, endocrine and psych systems are negative, except as otherwise noted.      Objective    /61 (BP Location: Right arm, Patient Position: Chair, Cuff Size: Adult Regular)   Pulse 65    Temp 98.1  F (36.7  C) (Oral)   Resp 15   Wt 74.9 kg (165 lb 1.6 oz)   SpO2 96%   BMI 27.47 kg/m    Body mass index is 27.47 kg/m .  Physical Exam   GENERAL: healthy, alert and no distress  EYES: Eyes grossly normal to inspection, PERRL and conjunctivae and sclerae normal  HENT: ear canals and TM's normal, nose and mouth without ulcers or lesions  NECK: no adenopathy, no asymmetry, masses, or scars and thyroid normal to palpation  RESP: lungs clear to auscultation - no rales, rhonchi or wheezes  CV: regular rate and rhythm, normal S1 S2, no S3 or S4, no murmur, click or rub, no peripheral edema and peripheral pulses strong  ABDOMEN: soft, nontender, no hepatosplenomegaly, no masses and bowel sounds normal  MS: no gross musculoskeletal defects noted, no edema  SKIN: no suspicious lesions or rashes  NEURO: Normal strength and tone, mentation intact and speech normal  PSYCH: mentation appears normal, affect normal/bright    Results for orders placed or performed in visit on 08/26/20 (from the past 24 hour(s))   Hemoglobin A1c   Result Value Ref Range    Hemoglobin A1C 6.7 (H) 0 - 5.6 %     See lipids and cmp        Assessment & Plan     Type 2 diabetes, HbA1C goal < 8% (H)    - Lipid panel reflex to direct LDL Fasting  - Albumin Random Urine Quantitative with Creat Ratio  - Hemoglobin A1c  - Comprehensive metabolic panel    Hyperlipidemia LDL goal <100    - Lipid panel reflex to direct LDL Fasting  - Comprehensive metabolic panel    Essential hypertension, benign    - Comprehensive metabolic panel    Allergic state, sequela    - EPINEPHrine (ANY BX GENERIC EQUIV) 0.3 MG/0.3ML injection 2-pack; Inject 0.3 mLs (0.3 mg) into the muscle once as needed (allergic state, sequela)    Encounter for immunization    - HEPATITIS B VACCINE, ADULT, IM; Future  - ZOSTER VACCINE RECOMBINANT ADJUVANTED IM NJX    Type 2 diabetes, HbA1c goal < 7% (H)    - Comprehensive metabolic panel     BMI:   Estimated body mass index is 27.47  "kg/m  as calculated from the following:    Height as of 10/10/19: 1.651 m (5' 5\").    Weight as of this encounter: 74.9 kg (165 lb 1.6 oz).           Work on weight loss    Return in about 1 week (around 9/2/2020) for nurse only immunization hep b .  Follow up visit and labs in six months   Yfn Baum MD  [facility]    "

## 2020-09-28 ENCOUNTER — ALLIED HEALTH/NURSE VISIT (OUTPATIENT)
Dept: FAMILY MEDICINE | Facility: CLINIC | Age: 75
End: 2020-09-28
Payer: MEDICARE

## 2020-09-28 DIAGNOSIS — Z23 ENCOUNTER FOR IMMUNIZATION: Primary | ICD-10-CM

## 2020-09-28 PROCEDURE — G0010 ADMIN HEPATITIS B VACCINE: HCPCS

## 2020-09-28 PROCEDURE — 90746 HEPB VACCINE 3 DOSE ADULT IM: CPT

## 2020-09-28 PROCEDURE — 99207 ZZC NO CHARGE NURSE ONLY: CPT

## 2020-10-12 ENCOUNTER — ALLIED HEALTH/NURSE VISIT (OUTPATIENT)
Dept: FAMILY MEDICINE | Facility: CLINIC | Age: 75
End: 2020-10-12
Payer: MEDICARE

## 2020-10-12 DIAGNOSIS — Z23 NEED FOR PROPHYLACTIC VACCINATION AND INOCULATION AGAINST INFLUENZA: Primary | ICD-10-CM

## 2020-10-12 PROCEDURE — 90662 IIV NO PRSV INCREASED AG IM: CPT

## 2020-10-12 PROCEDURE — G0008 ADMIN INFLUENZA VIRUS VAC: HCPCS

## 2020-11-04 ENCOUNTER — ALLIED HEALTH/NURSE VISIT (OUTPATIENT)
Dept: FAMILY MEDICINE | Facility: CLINIC | Age: 75
End: 2020-11-04
Payer: MEDICARE

## 2020-11-04 DIAGNOSIS — Z23 NEED FOR HEPATITIS B VACCINATION: Primary | ICD-10-CM

## 2020-11-04 PROCEDURE — 90746 HEPB VACCINE 3 DOSE ADULT IM: CPT

## 2020-11-04 PROCEDURE — G0010 ADMIN HEPATITIS B VACCINE: HCPCS

## 2020-11-04 PROCEDURE — 99207 PR NO CHARGE NURSE ONLY: CPT

## 2020-11-12 ENCOUNTER — OFFICE VISIT (OUTPATIENT)
Dept: UROLOGY | Facility: CLINIC | Age: 75
End: 2020-11-12
Payer: MEDICARE

## 2020-11-12 VITALS
HEIGHT: 65 IN | BODY MASS INDEX: 26.82 KG/M2 | WEIGHT: 161 LBS | SYSTOLIC BLOOD PRESSURE: 112 MMHG | DIASTOLIC BLOOD PRESSURE: 50 MMHG

## 2020-11-12 DIAGNOSIS — R33.9 URINARY RETENTION: Primary | ICD-10-CM

## 2020-11-12 PROCEDURE — 99213 OFFICE O/P EST LOW 20 MIN: CPT | Performed by: UROLOGY

## 2020-11-12 PROCEDURE — 81003 URINALYSIS AUTO W/O SCOPE: CPT | Mod: QW | Performed by: UROLOGY

## 2020-11-12 ASSESSMENT — PAIN SCALES - GENERAL: PAINLEVEL: NO PAIN (0)

## 2020-11-12 ASSESSMENT — MIFFLIN-ST. JEOR: SCORE: 1392.17

## 2020-11-12 NOTE — NURSING NOTE
Chief Complaint   Patient presents with     Atonic Bladder     Annual exam     Stephanie Ortega, EMT

## 2020-11-12 NOTE — PROGRESS NOTES
Sushant is a wonderful 75-year-old male with no family history of prostate cancer.  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  He is having no trouble, no dysuria, hematuria or infections.  Today's urinalysis looks normal.  Other past medical history is significant for diabetes, hearing loss, osteoarthrosis, hypertension, right sciatica.  He is a non-smoker.  He underwent cystoscopy in January 2019 but was not a candidate for TURP  Medications: Aspirin, Lipitor, EpiPen, Lopid, Zestril, multivitamin  Allergies: NSAIDs  Review of systems: As above  Exam: Alert and oriented, normal appearance, normal vital signs.  Normal respirations, neuro grossly intact.  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 Mo

## 2020-11-12 NOTE — LETTER
11/12/2020       RE: Sushant Zepeda  [address]  [facility] [address]     Dear Colleague,    Thank you for referring your patient, Sushant Zepeda, to the [facility]OLOGY [facility] [place] at [facility]. Please see a copy of my visit note below.    Sushant is a wonderful 75-year-old male with no family history of prostate cancer.  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  He is having no trouble, no dysuria, hematuria or infections.  Today's urinalysis looks normal.  Other past medical history is significant for diabetes, hearing loss, osteoarthrosis, hypertension, right sciatica.  He is a non-smoker.  He underwent cystoscopy in January 2019 but was not a candidate for TURP  Medications: Aspirin, Lipitor, EpiPen, Lopid, Zestril, multivitamin  Allergies: NSAIDs  Review of systems: As above  Exam: Alert and oriented, normal appearance, normal vital signs.  Normal respirations, neuro grossly intact.  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 Mo        Again, thank you for allowing me to participate in the care of your patient.      Sincerely,    Addy Sofia MD

## 2020-11-19 ENCOUNTER — MYC REFILL (OUTPATIENT)
Dept: FAMILY MEDICINE | Facility: CLINIC | Age: 75
End: 2020-11-19

## 2020-11-19 DIAGNOSIS — I10 ESSENTIAL HYPERTENSION, BENIGN: ICD-10-CM

## 2020-11-19 RX ORDER — LISINOPRIL 20 MG/1
20 TABLET ORAL DAILY
Qty: 90 TABLET | Refills: 1 | OUTPATIENT
Start: 2020-11-19

## 2021-01-04 ENCOUNTER — ALLIED HEALTH/NURSE VISIT (OUTPATIENT)
Dept: FAMILY MEDICINE | Facility: CLINIC | Age: 76
End: 2021-01-04
Payer: MEDICARE

## 2021-01-04 DIAGNOSIS — Z23 NEED FOR SHINGLES VACCINE: Primary | ICD-10-CM

## 2021-01-04 PROCEDURE — 90471 IMMUNIZATION ADMIN: CPT

## 2021-01-04 PROCEDURE — 90750 HZV VACC RECOMBINANT IM: CPT

## 2021-01-04 PROCEDURE — 99207 PR NO CHARGE NURSE ONLY: CPT

## 2021-01-15 ENCOUNTER — HEALTH MAINTENANCE LETTER (OUTPATIENT)
Age: 76
End: 2021-01-15

## 2021-01-24 ASSESSMENT — ENCOUNTER SYMPTOMS
JOINT SWELLING: 0
HEADACHES: 0
DIARRHEA: 0
FREQUENCY: 0
WEAKNESS: 0
FEVER: 0
NERVOUS/ANXIOUS: 0
ABDOMINAL PAIN: 0
CHILLS: 0
HEMATURIA: 0
SHORTNESS OF BREATH: 0
MYALGIAS: 1
NAUSEA: 0
CONSTIPATION: 0
SORE THROAT: 0
COUGH: 0
HEMATOCHEZIA: 0
EYE PAIN: 0
PALPITATIONS: 0
ARTHRALGIAS: 1
DYSURIA: 0
DIZZINESS: 0
HEARTBURN: 0
PARESTHESIAS: 0

## 2021-01-24 ASSESSMENT — ACTIVITIES OF DAILY LIVING (ADL): CURRENT_FUNCTION: NO ASSISTANCE NEEDED

## 2021-01-29 ENCOUNTER — IMMUNIZATION (OUTPATIENT)
Dept: NURSING | Facility: CLINIC | Age: 76
End: 2021-01-29
Payer: MEDICARE

## 2021-01-29 ENCOUNTER — OFFICE VISIT (OUTPATIENT)
Dept: FAMILY MEDICINE | Facility: CLINIC | Age: 76
End: 2021-01-29
Payer: MEDICARE

## 2021-01-29 VITALS
OXYGEN SATURATION: 98 % | TEMPERATURE: 97.9 F | DIASTOLIC BLOOD PRESSURE: 60 MMHG | HEART RATE: 69 BPM | WEIGHT: 173.13 LBS | SYSTOLIC BLOOD PRESSURE: 116 MMHG | BODY MASS INDEX: 28.81 KG/M2

## 2021-01-29 DIAGNOSIS — N18.2 SECONDARY DIABETES MELLITUS WITH STAGE 2 CHRONIC KIDNEY DISEASE (H): ICD-10-CM

## 2021-01-29 DIAGNOSIS — Z00.00 ENCOUNTER FOR MEDICARE ANNUAL WELLNESS EXAM: Primary | ICD-10-CM

## 2021-01-29 DIAGNOSIS — E13.22 SECONDARY DIABETES MELLITUS WITH STAGE 2 CHRONIC KIDNEY DISEASE (H): ICD-10-CM

## 2021-01-29 DIAGNOSIS — E11.59 TYPE 2 DIABETES MELLITUS WITH OTHER CIRCULATORY COMPLICATION, WITHOUT LONG-TERM CURRENT USE OF INSULIN (H): ICD-10-CM

## 2021-01-29 DIAGNOSIS — N18.4 CHRONIC KIDNEY DISEASE, STAGE IV (SEVERE) (H): ICD-10-CM

## 2021-01-29 PROCEDURE — G0439 PPPS, SUBSEQ VISIT: HCPCS | Performed by: FAMILY MEDICINE

## 2021-01-29 PROCEDURE — 91300 PR COVID VAC PFIZER DIL RECON 30 MCG/0.3 ML IM: CPT

## 2021-01-29 PROCEDURE — 0001A PR COVID VAC PFIZER DIL RECON 30 MCG/0.3 ML IM: CPT

## 2021-01-29 ASSESSMENT — ENCOUNTER SYMPTOMS
HEMATOCHEZIA: 0
CHILLS: 0
COUGH: 0
HEADACHES: 0
HEARTBURN: 0
SORE THROAT: 0
HEMATURIA: 0
CONSTIPATION: 0
MYALGIAS: 1
EYE PAIN: 0
FREQUENCY: 0
DYSURIA: 0
PARESTHESIAS: 0
FEVER: 0
ABDOMINAL PAIN: 0
NAUSEA: 0
ARTHRALGIAS: 1
DIZZINESS: 0
JOINT SWELLING: 0
PALPITATIONS: 0
WEAKNESS: 0
DIARRHEA: 0
SHORTNESS OF BREATH: 0
NERVOUS/ANXIOUS: 0

## 2021-01-29 ASSESSMENT — ACTIVITIES OF DAILY LIVING (ADL): CURRENT_FUNCTION: NO ASSISTANCE NEEDED

## 2021-01-29 NOTE — PATIENT INSTRUCTIONS
Patient Education   Personalized Prevention Plan  You are due for the preventive services outlined below.  Your care team is available to assist you in scheduling these services.  If you have already completed any of these items, please share that information with your care team to update in your medical record.  Health Maintenance Due   Topic Date Due     COVID-19 Vaccine (1 of 2) 05/15/1961     Annual Wellness Visit  11/01/2020     Diabetic Foot Exam  11/01/2020     ANNUAL REVIEW OF HM ORDERS  11/01/2020     FALL RISK ASSESSMENT  11/01/2020     Liver Monitoring Lab  02/26/2021

## 2021-01-29 NOTE — PROGRESS NOTES
"SUBJECTIVE:   Sushant Zepeda is a 75 year old male who presents for Preventive Visit.      Patient has been advised of split billing requirements and indicates understanding: Yes   Are you in the first 12 months of your Medicare coverage?  No    Healthy Habits:     In general, how would you rate your overall health?  Good    Frequency of exercise:  4-5 days/week    Duration of exercise:  30-45 minutes    Do you usually eat at least 4 servings of fruit and vegetables a day, include whole grains    & fiber and avoid regularly eating high fat or \"junk\" foods?  Yes    Taking medications regularly:  Yes    Medication side effects:  None    Ability to successfully perform activities of daily living:  No assistance needed    Home Safety:  No safety concerns identified    Hearing Impairment:  Difficulty following a conversation in a noisy restaurant or crowded room, feel that people are mumbling or not speaking clearly, need to ask people to speak up or repeat themselves, difficulty understanding soft or whispered speech and difficulty understanding speech on the telephone    In the past 6 months, have you been bothered by leaking of urine?  No    In general, how would you rate your overall mental or emotional health?  Excellent      PHQ-2 Total Score: 0    Additional concerns today:  No    Do you feel safe in your environment? Yes    Have you ever done Advance Care Planning? (For example, a Health Directive, POLST, or a discussion with a medical provider or your loved ones about your wishes): Yes, advance care planning is on file.       Fall risk  Fallen 2 or more times in the past year?: No  Any fall with injury in the past year?: No  click delete button to remove this line now  Cognitive Screening   1) Repeat 3 items (Leader, Season, Table)    2) Clock draw: NORMAL  3) 3 item recall: Recalls 3 objects  Results: NORMAL clock, 1-2 items recalled: COGNITIVE IMPAIRMENT LESS LIKELY    Mini-CogTM Copyright SARAH Mota. Licensed " by the author for use in [facility]; reprinted with permission (konrad@[facility]). All rights reserved.      Do you have sleep apnea, excessive snoring or daytime drowsiness?: no    Reviewed and updated as needed this visit by clinical staff  Tobacco  Allergies    Med Hx  Surg Hx  Fam Hx  Soc Hx        Reviewed and updated as needed this visit by Provider                Social History     Tobacco Use     Smoking status: Never Smoker     Smokeless tobacco: Never Used   Substance Use Topics     Alcohol use: Not Currently     Alcohol/week: 0.0 standard drinks     If you drink alcohol do you typically have >3 drinks per day or >7 drinks per week? No    Alcohol Use 1/24/2021   Prescreen: >3 drinks/day or >7 drinks/week? No               Current providers sharing in care for this patient include:   Patient Care Team:  Yfn Baum MD as PCP - General  Yfn Baum MD as Assigned PCP  Addy Sofia MD as Assigned Surgical Provider    The following health maintenance items are reviewed in Epic and correct as of today:  Health Maintenance   Topic Date Due     COVID-19 Vaccine (1 of 2) 05/15/1961     DIABETIC FOOT EXAM  11/01/2020     ANNUAL REVIEW OF HM ORDERS  11/01/2020     FALL RISK ASSESSMENT  11/01/2020     ALT  02/26/2021     A1C  02/26/2021     EYE EXAM  07/28/2021     BMP  08/26/2021     LIPID  08/26/2021     MICROALBUMIN  08/26/2021     CREATININE  08/26/2021     MEDICARE ANNUAL WELLNESS VISIT  01/29/2022     ADVANCE CARE PLANNING  06/14/2022     COLORECTAL CANCER SCREENING  03/02/2027     DTAP/TDAP/TD IMMUNIZATION (3 - Td) 08/16/2029     HEPATITIS C SCREENING  Completed     PHQ-2  Completed     INFLUENZA VACCINE  Completed     Pneumococcal Vaccine: 65+ Years  Completed     ZOSTER IMMUNIZATION  Completed     AORTIC ANEURYSM SCREENING (SYSTEM ASSIGNED)  Completed     Pneumococcal Vaccine: Pediatrics (0 to 5 Years) and At-Risk Patients (6 to 64 Years)  Aged Out     IPV  "IMMUNIZATION  Aged Out     MENINGITIS IMMUNIZATION  Aged Out     BP Readings from Last 3 Encounters:   01/29/21 116/60   11/12/20 112/50   08/26/20 110/61    Wt Readings from Last 3 Encounters:   01/29/21 78.5 kg (173 lb 2 oz)   11/12/20 73 kg (161 lb)   08/26/20 74.9 kg (165 lb 1.6 oz)                      Review of Systems   Constitutional: Negative for chills and fever.   HENT: Negative for congestion, ear pain, hearing loss and sore throat.    Eyes: Negative for pain and visual disturbance.   Respiratory: Negative for cough and shortness of breath.    Cardiovascular: Negative for chest pain, palpitations and peripheral edema.   Gastrointestinal: Negative for abdominal pain, constipation, diarrhea, heartburn, hematochezia and nausea.   Genitourinary: Positive for impotence. Negative for discharge, dysuria, frequency, genital sores, hematuria and urgency.   Musculoskeletal: Positive for arthralgias and myalgias. Negative for joint swelling.   Skin: Negative for rash.   Neurological: Negative for dizziness, weakness, headaches and paresthesias.   Psychiatric/Behavioral: Negative for mood changes. The patient is not nervous/anxious.      Constitutional, HEENT, cardiovascular, pulmonary, GI, , musculoskeletal, neuro, skin, endocrine and psych systems are negative, except as otherwise noted.    OBJECTIVE:   /60 (BP Location: Right arm, Patient Position: Chair, Cuff Size: Adult Large)   Pulse 69   Temp 97.9  F (36.6  C) (Oral)   Wt 78.5 kg (173 lb 2 oz)   SpO2 98%   BMI 28.81 kg/m   Estimated body mass index is 28.81 kg/m  as calculated from the following:    Height as of 11/12/20: 1.651 m (5' 5\").    Weight as of this encounter: 78.5 kg (173 lb 2 oz).  Physical Exam  GENERAL: healthy, alert and no distress  EYES: Eyes grossly normal to inspection, PERRL and conjunctivae and sclerae normal  HENT: ear canals and TM's normal, nose and mouth without ulcers or lesions  NECK: no adenopathy, no asymmetry, " "masses, or scars and thyroid normal to palpation  RESP: lungs clear to auscultation - no rales, rhonchi or wheezes  CV: regular rate and rhythm, normal S1 S2, no S3 or S4, no murmur, click or rub, no peripheral edema and peripheral pulses strong  ABDOMEN: soft, nontender, no hepatosplenomegaly, no masses and bowel sounds normal  MS: no gross musculoskeletal defects noted, no edema  SKIN: no suspicious lesions or rashes  NEURO: Normal strength and tone, mentation intact and speech normal  PSYCH: mentation appears normal, affect normal/bright    Diagnostic Test Results:  Labs reviewed in Epic    ASSESSMENT / PLAN:   1. Encounter for Medicare annual wellness exam      2. 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 Chronic kidney disease, stage IV (severe) (H)        Patient has been advised of split billing requirements and indicates understanding: Yes  COUNSELING:      Estimated body mass index is 28.81 kg/m  as calculated from the following:    Height as of 11/12/20: 1.651 m (5' 5\").    Weight as of this encounter: 78.5 kg (173 lb 2 oz).        He reports that he has never smoked. He has never used smokeless tobacco.      Appropriate preventive services were discussed with this patient, including applicable screening as appropriate for cardiovascular disease, diabetes, osteopenia/osteoporosis, and glaucoma.  As appropriate for age/gender, discussed screening for colorectal cancer, prostate cancer, breast cancer, and cervical cancer. Checklist reviewing preventive services available has been given to the patient.    Reviewed patients plan of care and provided an AVS. The Basic Care Plan (routine screening as documented in Health Maintenance) for Sushant meets the Care Plan requirement. This Care Plan has been established and reviewed with the Patient.    Counseling Resources:  ATP IV Guidelines  Pooled Cohorts Equation Calculator  Breast Cancer Risk Calculator  Breast Cancer: Medication " to Reduce Risk  FRAX Risk Assessment  ICSI Preventive Guidelines  Dietary Guidelines for Americans, 2010  USDA's MyPlate  ASA Prophylaxis  Lung CA Screening    Yfn Baum MD  [facility]    Identified Health Risks:

## 2021-02-05 DIAGNOSIS — E11.9 TYPE 2 DIABETES, HBA1C GOAL < 8% (H): ICD-10-CM

## 2021-02-05 RX ORDER — CARVEDILOL 25 MG/1
TABLET, FILM COATED ORAL
Qty: 100 STRIP | Refills: 3 | OUTPATIENT
Start: 2021-02-05

## 2021-02-05 RX ORDER — CARVEDILOL 25 MG/1
TABLET, FILM COATED ORAL
Qty: 100 STRIP | Refills: 3 | Status: SHIPPED | OUTPATIENT
Start: 2021-02-05 | End: 2021-05-06

## 2021-02-05 NOTE — TELEPHONE ENCOUNTER
Prescription approved per [facility] Refill Protocol.    Marcia Espinoza RN

## 2021-02-19 ENCOUNTER — IMMUNIZATION (OUTPATIENT)
Dept: NURSING | Facility: CLINIC | Age: 76
End: 2021-02-19
Attending: INTERNAL MEDICINE
Payer: MEDICARE

## 2021-02-19 PROCEDURE — 0002A PR COVID VAC PFIZER DIL RECON 30 MCG/0.3 ML IM: CPT

## 2021-02-19 PROCEDURE — 91300 PR COVID VAC PFIZER DIL RECON 30 MCG/0.3 ML IM: CPT

## 2021-02-22 DIAGNOSIS — E11.59 TYPE 2 DIABETES MELLITUS WITH OTHER CIRCULATORY COMPLICATION, WITHOUT LONG-TERM CURRENT USE OF INSULIN (H): ICD-10-CM

## 2021-02-22 LAB — HBA1C MFR BLD: 6.8 % (ref 0–5.6)

## 2021-02-22 PROCEDURE — 36415 COLL VENOUS BLD VENIPUNCTURE: CPT | Performed by: FAMILY MEDICINE

## 2021-02-22 PROCEDURE — 83036 HEMOGLOBIN GLYCOSYLATED A1C: CPT | Performed by: FAMILY MEDICINE

## 2021-02-23 ENCOUNTER — TELEPHONE (OUTPATIENT)
Dept: FAMILY MEDICINE | Facility: CLINIC | Age: 76
End: 2021-02-23

## 2021-03-05 ENCOUNTER — TELEPHONE (OUTPATIENT)
Dept: FAMILY MEDICINE | Facility: CLINIC | Age: 76
End: 2021-03-05

## 2021-03-05 NOTE — TELEPHONE ENCOUNTER
See [facility] Medicare Diabetic Supply form. Please sign and give to TC to fax. Form in InBasket.     Leroy LAM RN

## 2021-03-28 DIAGNOSIS — E78.5 HYPERLIPIDEMIA LDL GOAL <100: ICD-10-CM

## 2021-03-29 RX ORDER — ATORVASTATIN CALCIUM 40 MG/1
TABLET, FILM COATED ORAL
Qty: 90 TABLET | Refills: 0 | Status: SHIPPED | OUTPATIENT
Start: 2021-03-29 | End: 2021-06-23

## 2021-03-29 RX ORDER — GEMFIBROZIL 600 MG/1
TABLET, FILM COATED ORAL
Qty: 180 TABLET | Refills: 0 | Status: SHIPPED | OUTPATIENT
Start: 2021-03-29 | End: 2021-06-23

## 2021-03-29 NOTE — TELEPHONE ENCOUNTER
Routing refill request to provider for review/approval because:  Drug interaction warning - gemfibrozil and atorvastatin    Leroy LAM RN

## 2021-05-06 DIAGNOSIS — E11.9 TYPE 2 DIABETES, HBA1C GOAL < 8% (H): ICD-10-CM

## 2021-05-06 RX ORDER — CARVEDILOL 25 MG/1
TABLET, FILM COATED ORAL
Qty: 100 STRIP | Refills: 3 | Status: SHIPPED | OUTPATIENT
Start: 2021-05-06 | End: 2021-11-22

## 2021-05-18 DIAGNOSIS — I10 ESSENTIAL HYPERTENSION, BENIGN: ICD-10-CM

## 2021-05-20 RX ORDER — LISINOPRIL 20 MG/1
TABLET ORAL
Qty: 90 TABLET | Refills: 1 | Status: SHIPPED | OUTPATIENT
Start: 2021-05-20 | End: 2021-11-10

## 2021-05-20 NOTE — TELEPHONE ENCOUNTER
Prescription approved per [facility] Refill Protocol.    Marcia Espinoza RN

## 2021-06-18 ENCOUNTER — TELEPHONE (OUTPATIENT)
Dept: FAMILY MEDICINE | Facility: CLINIC | Age: 76
End: 2021-06-18

## 2021-06-18 NOTE — TELEPHONE ENCOUNTER
Medicare fax from [employer] for DM supplies.   Routed to Yfn Baum MD. Please note reason for testing 2 x daily, sign and give to TC to fax  Nacho Cummins RN

## 2021-06-23 DIAGNOSIS — E78.5 HYPERLIPIDEMIA LDL GOAL <100: ICD-10-CM

## 2021-06-23 RX ORDER — ATORVASTATIN CALCIUM 40 MG/1
TABLET, FILM COATED ORAL
Qty: 90 TABLET | Refills: 0 | Status: SHIPPED | OUTPATIENT
Start: 2021-06-23 | End: 2021-09-22

## 2021-06-23 RX ORDER — GEMFIBROZIL 600 MG/1
TABLET, FILM COATED ORAL
Qty: 180 TABLET | Refills: 0 | Status: SHIPPED | OUTPATIENT
Start: 2021-06-23 | End: 2021-09-22

## 2021-06-23 NOTE — TELEPHONE ENCOUNTER
Routing refill request to provider for review/approval because:  Drug interaction warning  gemfibrozil and atorvastatin      ELDA LeighN, RN  [facility]

## 2021-08-13 ENCOUNTER — TELEPHONE (OUTPATIENT)
Dept: FAMILY MEDICINE | Facility: CLINIC | Age: 76
End: 2021-08-13

## 2021-08-24 ENCOUNTER — TELEPHONE (OUTPATIENT)
Dept: FAMILY MEDICINE | Facility: CLINIC | Age: 76
End: 2021-08-24

## 2021-08-24 NOTE — TELEPHONE ENCOUNTER
Fax from [facility] [employer], filled out 2 months ago, see encounter, asking for another form to be filled out and signed, routed to Yfn Baum MD, see inbajacinda, sign, give to TC to fax    CONFIRM TWICE DAILY TESTING, PT ON ON DM MEDICATION, MEDICARE COVERS DAILY, CONFIRM  Radha Frazier RN, BSN  Message handled by CLINIC NURSE.

## 2021-09-05 ENCOUNTER — HEALTH MAINTENANCE LETTER (OUTPATIENT)
Age: 76
End: 2021-09-05

## 2021-09-14 ENCOUNTER — OFFICE VISIT (OUTPATIENT)
Dept: FAMILY MEDICINE | Facility: CLINIC | Age: 76
End: 2021-09-14
Payer: MEDICARE

## 2021-09-14 VITALS
BODY MASS INDEX: 28.29 KG/M2 | HEIGHT: 65 IN | OXYGEN SATURATION: 95 % | DIASTOLIC BLOOD PRESSURE: 68 MMHG | TEMPERATURE: 98.4 F | WEIGHT: 169.8 LBS | SYSTOLIC BLOOD PRESSURE: 125 MMHG | HEART RATE: 76 BPM | RESPIRATION RATE: 15 BRPM

## 2021-09-14 DIAGNOSIS — E11.9 TYPE 2 DIABETES, HBA1C GOAL < 8% (H): Primary | ICD-10-CM

## 2021-09-14 DIAGNOSIS — Z13.220 SCREENING FOR HYPERLIPIDEMIA: ICD-10-CM

## 2021-09-14 LAB
HBA1C MFR BLD: 6.8 % (ref 0–5.6)
HOLD SPECIMEN: NORMAL
HOLD SPECIMEN: NORMAL

## 2021-09-14 PROCEDURE — 99214 OFFICE O/P EST MOD 30 MIN: CPT | Performed by: FAMILY MEDICINE

## 2021-09-14 PROCEDURE — 36415 COLL VENOUS BLD VENIPUNCTURE: CPT | Performed by: FAMILY MEDICINE

## 2021-09-14 PROCEDURE — 82043 UR ALBUMIN QUANTITATIVE: CPT | Performed by: FAMILY MEDICINE

## 2021-09-14 PROCEDURE — 99207 PR FOOT EXAM NO CHARGE: CPT | Performed by: FAMILY MEDICINE

## 2021-09-14 PROCEDURE — 83036 HEMOGLOBIN GLYCOSYLATED A1C: CPT | Performed by: FAMILY MEDICINE

## 2021-09-14 ASSESSMENT — MIFFLIN-ST. JEOR: SCORE: 1427.09

## 2021-09-14 NOTE — PROGRESS NOTES
Subjective   Sushant Zepeda is a 76 year old who presents for the following health issues   (E11.9) Type 2 diabetes, HbA1C goal < 8% (H)  (primary encounter diagnosis)  Comment:   Plan: Hemoglobin A1c, Extra Tube, REVIEW OF HEALTH         MAINTENANCE PROTOCOL ORDERS, ALT, BASIC         METABOLIC PANEL, Lipid panel reflex to direct         LDL Fasting, Albumin Random Urine Quantitative         with Creat Ratio, CREATININE, FOOT EXAM            (Z13.220) Screening for hyperlipidemia  Comment:   Plan:     Feels well, gets regular exercise         History of Present Illness       Diabetes:   He presents for follow up of diabetes.  He is checking home blood glucose one time daily. He checks blood glucose before meals.  Blood glucose is never over 200 and never under 70. He is aware of hypoglycemia symptoms including dizziness. He has no concerns regarding his diabetes at this time.  He is not experiencing numbness or burning in feet, excessive thirst, blurry vision, weight changes or redness, sores or blisters on feet. The patient has not had a diabetic eye exam in the last 12 months.         He eats 2-3 servings of fruits and vegetables daily.He consumes 0 sweetened beverage(s) daily.He exercises with enough effort to increase his heart rate 30 to 60 minutes per day.  He exercises with enough effort to increase his heart rate 5 days per week.   He is taking medications regularly.         a1c has been consistently less than 7     Review of Systems      REVIEW OF SYSTEMS    Generally has been  feeling well until this episode. No problems with vision, hearing, dental or neck pain.Has mild airborne or ingestion allergy  No chest pain, palpitations, dyspnea, change in bowel habits, blood  in stool or dyspepsia.  No rashes, changing moles, weakness, lassitude or back problems.  No chronic issues . No dysuria  Patient not  a smoker. No problems with significant headaches.        Objective    /68 (BP Location:  "Right arm, Patient Position: Chair, Cuff Size: Adult Regular)   Pulse 76   Temp 98.4  F (36.9  C) (Oral)   Resp 15   Ht 1.651 m (5' 5\")   Wt 77 kg (169 lb 12.8 oz)   SpO2 95%   BMI 28.26 kg/m      Physical Exam   GENERAL: healthy, alert and no distress  EYES: Eyes grossly normal to inspection, PERRL and conjunctivae and sclerae normal  HENT: ear canals and TM's normal, nose and mouth without ulcers or lesions  NECK: no adenopathy, no asymmetry, masses, or scars and thyroid normal to palpation  RESP: lungs clear to auscultation - no rales, rhonchi or wheezes  CV: regular rate and rhythm, normal S1 S2, no S3 or S4, no murmur, click or rub, no peripheral edema and peripheral pulses strong  ABDOMEN: soft, nontender, no hepatosplenomegaly, no masses and bowel sounds normal  MS: no gross musculoskeletal defects noted, no edema  SKIN: no suspicious lesions or rashes  NEURO: Normal strength and tone, mentation intact and speech normal  PSYCH: mentation appears normal, affect normal/bright    (E11.9) Type 2 diabetes, HbA1C goal < 8% (H)  (primary encounter diagnosis)  Comment: with hypertension    Plan: Hemoglobin A1c, Extra Tube, REVIEW OF HEALTH         MAINTENANCE PROTOCOL ORDERS, ALT, BASIC         METABOLIC PANEL, Lipid panel reflex to direct         LDL Fasting, Albumin Random Urine Quantitative         with Creat Ratio, CREATININE            (Z13.220) Screening for hyperlipidemia  Comment:   Plan: stable    Current Outpatient Medications   Medication     ASPIRIN NOT PRESCRIBED (INTENTIONAL)     atorvastatin (LIPITOR) 40 MG tablet     CONTOUR TEST test strip     EPINEPHrine (ANY BX GENERIC EQUIV) 0.3 MG/0.3ML injection 2-pack     gemfibrozil (LOPID) 600 MG tablet     lisinopril (ZESTRIL) 20 MG tablet     MULTI-VITAMIN OR TABS     No current facility-administered medications for this visit.                     "

## 2021-09-15 LAB
CREAT UR-MCNC: 68 MG/DL
MICROALBUMIN UR-MCNC: <5 MG/L
MICROALBUMIN/CREAT UR: NORMAL MG/G{CREAT}

## 2021-09-19 DIAGNOSIS — E78.5 HYPERLIPIDEMIA LDL GOAL <100: ICD-10-CM

## 2021-09-22 RX ORDER — GEMFIBROZIL 600 MG/1
TABLET, FILM COATED ORAL
Qty: 180 TABLET | Refills: 0 | Status: SHIPPED | OUTPATIENT
Start: 2021-09-22 | End: 2021-12-20

## 2021-09-22 RX ORDER — ATORVASTATIN CALCIUM 40 MG/1
TABLET, FILM COATED ORAL
Qty: 90 TABLET | Refills: 0 | Status: SHIPPED | OUTPATIENT
Start: 2021-09-22 | End: 2021-12-20

## 2021-09-22 NOTE — TELEPHONE ENCOUNTER
Routing refill request to provider for review/approval because:  Labs not current:  annual lipids due August 2021, do you want lab only lipids now?  Radha Frazier RN, BSN  Message handled by CLINIC NURSE.

## 2021-10-23 ENCOUNTER — MYC MEDICAL ADVICE (OUTPATIENT)
Dept: FAMILY MEDICINE | Facility: CLINIC | Age: 76
End: 2021-10-23

## 2021-10-26 ENCOUNTER — OFFICE VISIT (OUTPATIENT)
Dept: UROLOGY | Facility: CLINIC | Age: 76
End: 2021-10-26
Payer: MEDICARE

## 2021-10-26 VITALS
WEIGHT: 165 LBS | SYSTOLIC BLOOD PRESSURE: 114 MMHG | HEIGHT: 65 IN | DIASTOLIC BLOOD PRESSURE: 64 MMHG | BODY MASS INDEX: 27.49 KG/M2

## 2021-10-26 DIAGNOSIS — R33.9 URINARY RETENTION: ICD-10-CM

## 2021-10-26 DIAGNOSIS — N18.2 CKD (CHRONIC KIDNEY DISEASE) STAGE 2, GFR 60-89 ML/MIN: Primary | ICD-10-CM

## 2021-10-26 PROCEDURE — 99213 OFFICE O/P EST LOW 20 MIN: CPT | Performed by: STUDENT IN AN ORGANIZED HEALTH CARE EDUCATION/TRAINING PROGRAM

## 2021-10-26 ASSESSMENT — PAIN SCALES - GENERAL: PAINLEVEL: NO PAIN (0)

## 2021-10-26 ASSESSMENT — MIFFLIN-ST. JEOR: SCORE: 1405.32

## 2021-10-26 NOTE — PROGRESS NOTES
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 cathing about 4 times a day +/-.     PHYSICAL EXAM  Patient is a 76 year old  male   Vitals: Blood pressure 114/64, height 1.651 m (5' 5\"), weight 74.8 kg (165 lb).  Body mass index is 27.46 kg/m .  General Appearance Adult:   Alert, no acute distress, oriented  HENT: throat/mouth:normal, good dentition  Lungs: no respiratory distress, or pursed lip breathing  Heart: No obvious jugular venous distension present  Abdomen: soft, nontender, no organomegaly or masses  Musculoskeltal: extremities normal, no peripheral edema  Skin: no suspicious lesions or rashes  Neuro: Alert, oriented, speech and mentation normal  Psych: affect and mood normal  Gait: Normal  : circ phallus  Normal scrotum  Bilateral testes normal  Benign ~50 gram prostate      ASSESSMENT and PLAN  76 year old male who presents with a 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 Doing well on ISC    - continue ISC 4 times a day indefinitely  - return 1 year with AUGUSTINA Mo MD   [facility]rology  [facility] Phone: [phone]    "

## 2021-10-26 NOTE — NURSING NOTE
Chief Complaint   Patient presents with     Urinary Retention     Yearly followup former  patient.   Latasha Brizuela, STEFANIN

## 2021-10-26 NOTE — LETTER
"10/26/2021       RE: Sushant Zepeda  [address] [place] [id number]     Dear Colleague,    Thank you for referring your patient, Sushant Zepeda, to the [facility] UROLOGY CLINIC [place] at [facility]. Please see a copy of my visit note below.    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 cathing about 4 times a day +/-.     PHYSICAL EXAM  Patient is a 76 year old  male   Vitals: Blood pressure 114/64, height 1.651 m (5' 5\"), weight 74.8 kg (165 lb).  Body mass index is 27.46 kg/m .  General Appearance Adult:   Alert, no acute distress, oriented  HENT: throat/mouth:normal, good dentition  Lungs: no respiratory distress, or pursed lip breathing  Heart: No obvious jugular venous distension present  Abdomen: soft, nontender, no organomegaly or masses  Musculoskeltal: extremities normal, no peripheral edema  Skin: no suspicious lesions or rashes  Neuro: Alert, oriented, speech and mentation normal  Psych: affect and mood normal  Gait: Normal  : circ phallus  Normal scrotum  Bilateral testes normal  Benign ~50 gram prostate      ASSESSMENT and PLAN  76 year old male who presents with a 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 Doing well on ISC    - continue ISC 4 times a day indefinitely  - return 1 year with UA      Ramin Mo MD   [facility] Urology  [facility] Phone: [phone]      "

## 2021-11-01 ENCOUNTER — TELEPHONE (OUTPATIENT)
Dept: FAMILY MEDICINE | Facility: CLINIC | Age: 76
End: 2021-11-01

## 2021-11-01 NOTE — TELEPHONE ENCOUNTER
Fax from [facility],  Needs ISAAC LAZO MD signature for DM medicare supplies, see form, sign, give to TC to fax  Radha Frazier RN, BSN  Message handled by CLINIC NURSE.

## 2021-11-02 ENCOUNTER — MEDICAL CORRESPONDENCE (OUTPATIENT)
Dept: HEALTH INFORMATION MANAGEMENT | Facility: CLINIC | Age: 76
End: 2021-11-02
Payer: MEDICARE

## 2021-11-09 DIAGNOSIS — I10 ESSENTIAL HYPERTENSION, BENIGN: ICD-10-CM

## 2021-11-10 DIAGNOSIS — I10 ESSENTIAL HYPERTENSION, BENIGN: ICD-10-CM

## 2021-11-10 RX ORDER — LISINOPRIL 20 MG/1
TABLET ORAL
Qty: 90 TABLET | Refills: 0 | Status: SHIPPED | OUTPATIENT
Start: 2021-11-10 | End: 2022-02-04

## 2021-11-10 NOTE — TELEPHONE ENCOUNTER
Routing refill request to provider for review/approval because:   ACE Inhibitors (Including Combos) Protocol Failed 11/09/2021 11:40 AM   Protocol Details  Normal serum creatinine on file in past 12 months    Normal serum potassium on file in past 12 months        Creatinine   Date Value Ref Range Status   08/26/2020 1.13 0.66 - 1.25 mg/dL Final     Potassium   Date Value Ref Range Status   08/26/2020 5.0 3.4 - 5.3 mmol/L Final     Sheron Tom RN

## 2021-11-10 NOTE — TELEPHONE ENCOUNTER
Doesn't look like I have seen this patient but appears he is likely establishing with [facility]. Has appt with me in march, but really needs some labs. Looks like dr. ralph future ordered some fasting labs back in sept. Please schedule fasting lab only. Refilled to allow.     -daphne barton, pac

## 2021-11-11 RX ORDER — LISINOPRIL 20 MG/1
TABLET ORAL
Qty: 90 TABLET | Refills: 1 | OUTPATIENT
Start: 2021-11-11

## 2021-11-11 NOTE — TELEPHONE ENCOUNTER
Duplicate request. Prescription refilled yesterday. Refusing refill request and closing encounter.     Alma Blevins RN on 11/11/2021 at 11:30 AM

## 2021-11-11 NOTE — TELEPHONE ENCOUNTER
Called the pt and apt made for labs.Stephani Corral MA  [facility].

## 2021-11-12 ENCOUNTER — E-VISIT (OUTPATIENT)
Dept: FAMILY MEDICINE | Facility: CLINIC | Age: 76
End: 2021-11-12
Payer: MEDICARE

## 2021-11-12 DIAGNOSIS — Z91.199 FAILURE TO ATTEND APPOINTMENT: Primary | ICD-10-CM

## 2021-11-12 NOTE — TELEPHONE ENCOUNTER
Called patient, has a lab only appointment scheduled on Monday 11/15/21. Will come in fasting. Ruth Behrens.

## 2021-11-12 NOTE — TELEPHONE ENCOUNTER
See evisit. Patient does not need visit for lab draw. Just lab only appt. Will cancel. Please schedule lab only for patient.     -daphne barton, selwyn  Appointment cancelled.

## 2021-11-15 ENCOUNTER — LAB (OUTPATIENT)
Dept: LAB | Facility: CLINIC | Age: 76
End: 2021-11-15
Payer: MEDICARE

## 2021-11-15 DIAGNOSIS — E11.9 TYPE 2 DIABETES, HBA1C GOAL < 8% (H): ICD-10-CM

## 2021-11-15 DIAGNOSIS — N18.2 CKD (CHRONIC KIDNEY DISEASE) STAGE 2, GFR 60-89 ML/MIN: ICD-10-CM

## 2021-11-15 DIAGNOSIS — E11.59 TYPE 2 DIABETES MELLITUS WITH OTHER CIRCULATORY COMPLICATION, WITHOUT LONG-TERM CURRENT USE OF INSULIN (H): Primary | ICD-10-CM

## 2021-11-15 LAB
ALT SERPL W P-5'-P-CCNC: 19 U/L (ref 0–70)
ANION GAP SERPL CALCULATED.3IONS-SCNC: 7 MMOL/L (ref 3–14)
BUN SERPL-MCNC: 27 MG/DL (ref 7–30)
CALCIUM SERPL-MCNC: 9.2 MG/DL (ref 8.5–10.1)
CHLORIDE BLD-SCNC: 111 MMOL/L (ref 94–109)
CHOLEST SERPL-MCNC: 123 MG/DL
CO2 SERPL-SCNC: 22 MMOL/L (ref 20–32)
CREAT SERPL-MCNC: 1.09 MG/DL (ref 0.66–1.25)
FASTING STATUS PATIENT QL REPORTED: YES
GFR SERPL CREATININE-BSD FRML MDRD: 66 ML/MIN/1.73M2
GLUCOSE BLD-MCNC: 134 MG/DL (ref 70–99)
HDLC SERPL-MCNC: 51 MG/DL
HGB BLD-MCNC: 13.9 G/DL (ref 13.3–17.7)
LDLC SERPL CALC-MCNC: 57 MG/DL
NONHDLC SERPL-MCNC: 72 MG/DL
PHOSPHATE SERPL-MCNC: 2.8 MG/DL (ref 2.5–4.5)
POTASSIUM BLD-SCNC: 4.5 MMOL/L (ref 3.4–5.3)
SODIUM SERPL-SCNC: 140 MMOL/L (ref 133–144)
TRIGL SERPL-MCNC: 73 MG/DL

## 2021-11-15 PROCEDURE — 80061 LIPID PANEL: CPT

## 2021-11-15 PROCEDURE — 84460 ALANINE AMINO (ALT) (SGPT): CPT

## 2021-11-15 PROCEDURE — 85018 HEMOGLOBIN: CPT

## 2021-11-15 PROCEDURE — 80048 BASIC METABOLIC PNL TOTAL CA: CPT

## 2021-11-15 PROCEDURE — 84100 ASSAY OF PHOSPHORUS: CPT

## 2021-11-15 PROCEDURE — 36415 COLL VENOUS BLD VENIPUNCTURE: CPT

## 2021-11-22 DIAGNOSIS — E11.9 TYPE 2 DIABETES, HBA1C GOAL < 8% (H): ICD-10-CM

## 2021-11-22 RX ORDER — CARVEDILOL 25 MG/1
TABLET, FILM COATED ORAL
Qty: 100 STRIP | Refills: 3 | Status: SHIPPED | OUTPATIENT
Start: 2021-11-22 | End: 2021-11-29

## 2021-11-22 NOTE — TELEPHONE ENCOUNTER
Prescription approved per [facility] Refill Protocol.  ELDA LeighN, RN  [facility]

## 2021-11-29 ENCOUNTER — TELEPHONE (OUTPATIENT)
Dept: FAMILY MEDICINE | Facility: CLINIC | Age: 76
End: 2021-11-29
Payer: MEDICARE

## 2021-11-29 DIAGNOSIS — E11.9 TYPE 2 DIABETES, HBA1C GOAL < 8% (H): ICD-10-CM

## 2021-11-29 RX ORDER — CARVEDILOL 25 MG/1
TABLET, FILM COATED ORAL
Qty: 100 STRIP | Refills: 3 | Status: SHIPPED | OUTPATIENT
Start: 2021-11-29 | End: 2022-12-30

## 2021-11-29 NOTE — TELEPHONE ENCOUNTER
Received a call from [employer], stating they never received fax back for Diabetic Detailed Order, unable to locate the fax, asked them to refax form.  Received fax from [employer], will send to Tan Urias for review. Ruth Behrens.

## 2021-11-29 NOTE — TELEPHONE ENCOUNTER
Patient does not need to check more than once daily per medicare standards. They will not cover this more than once daily. Updated prescription to once daily. Sent to pharmacy. Form shredded. Not needed.     -daphne barton, pac

## 2021-11-30 ENCOUNTER — ALLIED HEALTH/NURSE VISIT (OUTPATIENT)
Dept: FAMILY MEDICINE | Facility: CLINIC | Age: 76
End: 2021-11-30
Payer: MEDICARE

## 2021-11-30 DIAGNOSIS — E11.9 TYPE 2 DIABETES MELLITUS WITHOUT COMPLICATION (H): Primary | ICD-10-CM

## 2021-11-30 PROCEDURE — 99207 PR NO CHARGE NURSE ONLY: CPT

## 2021-11-30 NOTE — PROGRESS NOTES
Pt walked in, discussed [facility] medicare DM testing form, form shredded yesterday, previous form was 2 times per day and now only needs one per day, pt in agreement for one per day testing, called [employer], will refax form, will have ZB fill out, sign, fax to [employer] store and not [phone] number, form received, ZB signed, faxed back to [employer], called pt, informed, he will f/u with [employer], let me know if any further issues    Radha Frazier RN, BSN  Message handled by CLINIC NURSE.

## 2021-12-01 NOTE — PROGRESS NOTES
Pt walks in again, faxed to [facility], pt wants sent to [facility], TC will [facility] DM [facility], pt provided a copy to bring to pharmacy, recommend pt sent Mychart message and not walk into clinic  Radha Frazier RN, BSN  Message handled by CLINIC NURSE.

## 2021-12-01 NOTE — PROGRESS NOTES
Pt walks in, went to [employer], [facility] informs received from but not signed, ??, I watched ZB sign and I personally faxed form, called [facility], on hold x 15 minutes, kept putting me on hold, never answered, found form, REFAXED, copy provided to pt per TC  Radha Frazier RN, BSN  Message handled by CLINIC NURSE.

## 2021-12-07 ENCOUNTER — TELEPHONE (OUTPATIENT)
Dept: FAMILY MEDICINE | Facility: CLINIC | Age: 76
End: 2021-12-07
Payer: MEDICARE

## 2021-12-07 NOTE — TELEPHONE ENCOUNTER
[facility] for DM testing supplies, see multiple encounters, have processed this form several times, pt was given a copy last week in addition to faxing to his pharmacy, called number listed [phone], insists on redoing form again, previous form dated 7/31/21 and they want dated 12/2/21, aware forms were processed in June, August x 2 dates, Nov x 4 times, form filled out per their request, faxed, copy saved again at gold RN desk  Radha Frazier RN, BSN  Message handled by CLINIC NURSE.

## 2021-12-16 ENCOUNTER — TELEPHONE (OUTPATIENT)
Dept: FAMILY MEDICINE | Facility: CLINIC | Age: 76
End: 2021-12-16
Payer: MEDICARE

## 2021-12-16 NOTE — TELEPHONE ENCOUNTER
Another fax from [facility]'s medicare department, see multiple encounters for same form, called store, faxed to them to sort out, form completed in its entirety and faxed 12/7/21  Radha Frazier RN, BSN  [facility]

## 2021-12-17 DIAGNOSIS — E78.5 HYPERLIPIDEMIA LDL GOAL <100: ICD-10-CM

## 2021-12-20 RX ORDER — GEMFIBROZIL 600 MG/1
TABLET, FILM COATED ORAL
Qty: 180 TABLET | Refills: 1 | Status: SHIPPED | OUTPATIENT
Start: 2021-12-20 | End: 2022-03-15

## 2021-12-20 RX ORDER — ATORVASTATIN CALCIUM 40 MG/1
TABLET, FILM COATED ORAL
Qty: 90 TABLET | Refills: 1 | Status: SHIPPED | OUTPATIENT
Start: 2021-12-20 | End: 2022-06-13

## 2021-12-20 NOTE — TELEPHONE ENCOUNTER
Routing refill request to provider for review/approval because:  Drug interaction warning    Marcia Espinoza, RN

## 2021-12-21 ENCOUNTER — TRANSFERRED RECORDS (OUTPATIENT)
Dept: HEALTH INFORMATION MANAGEMENT | Facility: CLINIC | Age: 76
End: 2021-12-21
Payer: MEDICARE

## 2021-12-21 LAB — RETINOPATHY: POSITIVE

## 2022-01-13 DIAGNOSIS — T78.40XS ALLERGY, SEQUELA: Primary | ICD-10-CM

## 2022-01-13 NOTE — TELEPHONE ENCOUNTER
Reason for Call:  Other prescription    Detailed comments:  Patient sent message for EPI pen refill, he states no one has contact him.    Phone Number Patient can be reached at: Cell number on file:    Telephone Information:   Mobile [phone]       Best Time: Anytime     Can we leave a detailed message on this number? YES    Call taken on 2022 at 1:20 PM by Nellie Phillip            Medication Question or Refill    Who is calling: Sushant    What medication are you calling about (include dose and sig)?: Epinephrine (Epi pen) Pt left msg on [employer] Voicemail  Controlled Substance Agreement on file:     Who prescribed the medication?: Adonay    Do you need a refill? Yes:     When did you use the medication last? Med has     Patient offered an appointment? No    Do you have any questions or concerns?  No    Requested Pharmacy: [facility]    Okay to leave a detailed message?: Yes at Cell number on file:    Telephone Information:   Mobile [phone]

## 2022-01-14 RX ORDER — EPINEPHRINE 0.3 MG/.3ML
0.3 INJECTION SUBCUTANEOUS
Qty: 0.6 ML | Refills: 3 | Status: SHIPPED | OUTPATIENT
Start: 2022-01-14 | End: 2023-07-18

## 2022-02-04 DIAGNOSIS — I10 ESSENTIAL HYPERTENSION, BENIGN: ICD-10-CM

## 2022-02-04 RX ORDER — LISINOPRIL 20 MG/1
TABLET ORAL
Qty: 90 TABLET | Refills: 1 | Status: SHIPPED | OUTPATIENT
Start: 2022-02-04 | End: 2022-07-28

## 2022-03-02 ENCOUNTER — TELEPHONE (OUTPATIENT)
Dept: FAMILY MEDICINE | Facility: CLINIC | Age: 77
End: 2022-03-02
Payer: MEDICARE

## 2022-03-02 NOTE — TELEPHONE ENCOUNTER
Received another CMN form, no changes, see multiple forms filled out in Nov and Dec, faxed note back for [facility] to update their system per Tan, should only need to be done annually  Radha Frazier RN, BSN  [facility]

## 2022-03-12 SDOH — ECONOMIC STABILITY: TRANSPORTATION INSECURITY
IN THE PAST 12 MONTHS, HAS THE LACK OF TRANSPORTATION KEPT YOU FROM MEDICAL APPOINTMENTS OR FROM GETTING MEDICATIONS?: NO

## 2022-03-12 SDOH — ECONOMIC STABILITY: INCOME INSECURITY: HOW HARD IS IT FOR YOU TO PAY FOR THE VERY BASICS LIKE FOOD, HOUSING, MEDICAL CARE, AND HEATING?: NOT VERY HARD

## 2022-03-12 SDOH — HEALTH STABILITY: PHYSICAL HEALTH: ON AVERAGE, HOW MANY DAYS PER WEEK DO YOU ENGAGE IN MODERATE TO STRENUOUS EXERCISE (LIKE A BRISK WALK)?: 0 DAYS

## 2022-03-12 SDOH — ECONOMIC STABILITY: TRANSPORTATION INSECURITY
IN THE PAST 12 MONTHS, HAS LACK OF TRANSPORTATION KEPT YOU FROM MEETINGS, WORK, OR FROM GETTING THINGS NEEDED FOR DAILY LIVING?: NO

## 2022-03-12 SDOH — HEALTH STABILITY: PHYSICAL HEALTH: ON AVERAGE, HOW MANY MINUTES DO YOU ENGAGE IN EXERCISE AT THIS LEVEL?: 10 MIN

## 2022-03-12 SDOH — ECONOMIC STABILITY: INCOME INSECURITY: IN THE LAST 12 MONTHS, WAS THERE A TIME WHEN YOU WERE NOT ABLE TO PAY THE MORTGAGE OR RENT ON TIME?: NO

## 2022-03-12 SDOH — ECONOMIC STABILITY: FOOD INSECURITY: WITHIN THE PAST 12 MONTHS, THE FOOD YOU BOUGHT JUST DIDN'T LAST AND YOU DIDN'T HAVE MONEY TO GET MORE.: NEVER TRUE

## 2022-03-12 SDOH — ECONOMIC STABILITY: FOOD INSECURITY: WITHIN THE PAST 12 MONTHS, YOU WORRIED THAT YOUR FOOD WOULD RUN OUT BEFORE YOU GOT MONEY TO BUY MORE.: NEVER TRUE

## 2022-03-12 ASSESSMENT — ENCOUNTER SYMPTOMS
NAUSEA: 0
PARESTHESIAS: 0
HEMATURIA: 0
FREQUENCY: 0
WEAKNESS: 0
HEMATOCHEZIA: 0
NERVOUS/ANXIOUS: 0
HEARTBURN: 0
SHORTNESS OF BREATH: 0
HEADACHES: 0
SORE THROAT: 0
CHILLS: 0
PALPITATIONS: 0
CONSTIPATION: 0
FEVER: 0
JOINT SWELLING: 0
COUGH: 0
DIARRHEA: 0
DIZZINESS: 0
MYALGIAS: 0
ARTHRALGIAS: 1
ABDOMINAL PAIN: 0
EYE PAIN: 0
DYSURIA: 0

## 2022-03-12 ASSESSMENT — ACTIVITIES OF DAILY LIVING (ADL): CURRENT_FUNCTION: NO ASSISTANCE NEEDED

## 2022-03-12 ASSESSMENT — LIFESTYLE VARIABLES
HOW MANY STANDARD DRINKS CONTAINING ALCOHOL DO YOU HAVE ON A TYPICAL DAY: 1 OR 2
HOW OFTEN DO YOU HAVE SIX OR MORE DRINKS ON ONE OCCASION: NEVER
HOW OFTEN DO YOU HAVE A DRINK CONTAINING ALCOHOL: MONTHLY OR LESS

## 2022-03-12 ASSESSMENT — SOCIAL DETERMINANTS OF HEALTH (SDOH)
HOW OFTEN DO YOU ATTEND CHURCH OR RELIGIOUS SERVICES?: NEVER
DO YOU BELONG TO ANY CLUBS OR ORGANIZATIONS SUCH AS CHURCH GROUPS UNIONS, FRATERNAL OR ATHLETIC GROUPS, OR SCHOOL GROUPS?: NO
IN A TYPICAL WEEK, HOW MANY TIMES DO YOU TALK ON THE PHONE WITH FAMILY, FRIENDS, OR NEIGHBORS?: MORE THAN THREE TIMES A WEEK
HOW OFTEN DO YOU GET TOGETHER WITH FRIENDS OR RELATIVES?: THREE TIMES A WEEK

## 2022-03-15 ENCOUNTER — OFFICE VISIT (OUTPATIENT)
Dept: FAMILY MEDICINE | Facility: CLINIC | Age: 77
End: 2022-03-15
Payer: MEDICARE

## 2022-03-15 VITALS
DIASTOLIC BLOOD PRESSURE: 68 MMHG | OXYGEN SATURATION: 98 % | WEIGHT: 169 LBS | TEMPERATURE: 97.5 F | HEIGHT: 65 IN | BODY MASS INDEX: 28.16 KG/M2 | SYSTOLIC BLOOD PRESSURE: 131 MMHG | HEART RATE: 66 BPM

## 2022-03-15 DIAGNOSIS — E11.9 TYPE 2 DIABETES MELLITUS WITHOUT COMPLICATION, WITHOUT LONG-TERM CURRENT USE OF INSULIN (H): ICD-10-CM

## 2022-03-15 DIAGNOSIS — R29.2 HYPOREFLEXIA: ICD-10-CM

## 2022-03-15 DIAGNOSIS — R25.2 MUSCLE CRAMPS: ICD-10-CM

## 2022-03-15 DIAGNOSIS — N18.2 SECONDARY DIABETES MELLITUS WITH STAGE 2 CHRONIC KIDNEY DISEASE (H): ICD-10-CM

## 2022-03-15 DIAGNOSIS — Z00.00 ENCOUNTER FOR MEDICARE ANNUAL WELLNESS EXAM: Primary | ICD-10-CM

## 2022-03-15 DIAGNOSIS — G25.0 ESSENTIAL TREMOR: ICD-10-CM

## 2022-03-15 DIAGNOSIS — E13.22 SECONDARY DIABETES MELLITUS WITH STAGE 2 CHRONIC KIDNEY DISEASE (H): ICD-10-CM

## 2022-03-15 PROBLEM — N18.4 CHRONIC KIDNEY DISEASE, STAGE IV (SEVERE) (H): Status: RESOLVED | Noted: 2019-01-03 | Resolved: 2022-03-15

## 2022-03-15 LAB
HBA1C MFR BLD: 6.9 % (ref 0–5.6)
HOLD SPECIMEN: NORMAL
HOLD SPECIMEN: NORMAL

## 2022-03-15 PROCEDURE — G0439 PPPS, SUBSEQ VISIT: HCPCS | Performed by: PHYSICIAN ASSISTANT

## 2022-03-15 PROCEDURE — 99213 OFFICE O/P EST LOW 20 MIN: CPT | Mod: 25 | Performed by: PHYSICIAN ASSISTANT

## 2022-03-15 PROCEDURE — 83735 ASSAY OF MAGNESIUM: CPT | Performed by: PHYSICIAN ASSISTANT

## 2022-03-15 PROCEDURE — 36415 COLL VENOUS BLD VENIPUNCTURE: CPT | Performed by: PHYSICIAN ASSISTANT

## 2022-03-15 PROCEDURE — 83036 HEMOGLOBIN GLYCOSYLATED A1C: CPT | Performed by: PHYSICIAN ASSISTANT

## 2022-03-15 PROCEDURE — 84443 ASSAY THYROID STIM HORMONE: CPT | Performed by: PHYSICIAN ASSISTANT

## 2022-03-15 PROCEDURE — 82043 UR ALBUMIN QUANTITATIVE: CPT | Performed by: PHYSICIAN ASSISTANT

## 2022-03-15 RX ORDER — BENZONATATE 200 MG/1
CAPSULE ORAL
COMMUNITY
Start: 2022-01-11 | End: 2022-03-15

## 2022-03-15 RX ORDER — ALBUTEROL SULFATE 90 UG/1
AEROSOL, METERED RESPIRATORY (INHALATION)
COMMUNITY
Start: 2022-01-11 | End: 2022-08-24

## 2022-03-15 RX ORDER — ALBUTEROL SULFATE 90 UG/1
2-4 AEROSOL, METERED RESPIRATORY (INHALATION)
COMMUNITY
Start: 2022-01-11 | End: 2022-08-24

## 2022-03-15 RX ORDER — BENZONATATE 200 MG/1
200 CAPSULE ORAL
COMMUNITY
Start: 2022-01-11 | End: 2022-08-24

## 2022-03-15 ASSESSMENT — ENCOUNTER SYMPTOMS
COUGH: 0
CHILLS: 0
MYALGIAS: 0
FREQUENCY: 0
WEAKNESS: 0
PALPITATIONS: 0
HEARTBURN: 0
DIZZINESS: 0
ABDOMINAL PAIN: 0
JOINT SWELLING: 0
NERVOUS/ANXIOUS: 0
HEMATURIA: 0
HEADACHES: 0
CONSTIPATION: 0
NAUSEA: 0
ARTHRALGIAS: 1
EYE PAIN: 0
FEVER: 0
SORE THROAT: 0
SHORTNESS OF BREATH: 0
HEMATOCHEZIA: 0
DYSURIA: 0
PARESTHESIAS: 0
DIARRHEA: 0

## 2022-03-15 ASSESSMENT — ACTIVITIES OF DAILY LIVING (ADL): CURRENT_FUNCTION: NO ASSISTANCE NEEDED

## 2022-03-15 NOTE — PROGRESS NOTES
"SUBJECTIVE:   Sushant Zepeda is a 76 year old male who presents for Preventive Visit.    {    Are you in the first 12 months of your Medicare coverage?  No    Healthy Habits:     In general, how would you rate your overall health?  Good    Frequency of exercise:  2-3 days/week    Duration of exercise:  15-30 minutes    Do you usually eat at least 4 servings of fruit and vegetables a day, include whole grains    & fiber and avoid regularly eating high fat or \"junk\" foods?  Yes    Taking medications regularly:  Yes    Medication side effects:  None    Ability to successfully perform activities of daily living:  No assistance needed    Home Safety:  No safety concerns identified    Hearing Impairment:  Difficulty following a conversation in a noisy restaurant or crowded room, need to ask people to speak up or repeat themselves and difficulty understanding soft or whispered speech    In the past 6 months, have you been bothered by leaking of urine?  No    In general, how would you rate your overall mental or emotional health?  Good      PHQ-2 Total Score: 0    Additional concerns today:  Yes    Do you feel safe in your environment? Yes    Have you ever done Advance Care Planning? (For example, a Health Directive, POLST, or a discussion with a medical provider or your loved ones about your wishes): No, advance care planning information given to patient to review.  Patient declined advance care planning discussion at this time.       Fall risk  Fallen 2 or more times in the past year?: No  Any fall with injury in the past year?: No    Cognitive Screening   1) Repeat 3 items (Leader, Season, Table)    2) Clock draw: NORMAL  3) 3 item recall: Recalls 1 object   Results: NORMAL clock, 1-2 items recalled: COGNITIVE IMPAIRMENT LESS LIKELY    Mini-CogTM Copyright SARAH Mota. Licensed by the author for use in [facility]; reprinted with permission (konrad@.[email or web address]). All rights reserved.      Do you have sleep apnea, " excessive snoring or daytime drowsiness?: no    Reviewed and updated as needed this visit by clinical staff   Tobacco  Allergies  Meds  Problems  Med Hx  Surg Hx  Fam Hx  Soc   Hx        Reviewed and updated as needed this visit by Provider   Tobacco  Allergies  Meds  Problems  Med Hx  Surg Hx  Fam Hx         Social History     Tobacco Use     Smoking status: Never Smoker     Smokeless tobacco: Never Used   Substance Use Topics     Alcohol use: Not Currently     Alcohol/week: 0.0 standard drinks     If you drink alcohol do you typically have >3 drinks per day or >7 drinks per week? No    No flowsheet data found.        Diabetes Follow-up    How often are you checking your blood sugar? One time daily  What time of day are you checking your blood sugars (select all that apply)?  Before meals  Have you had any blood sugars above 200?  No  Have you had any blood sugars below 70?  No    What symptoms do you notice when your blood sugar is low?  None    What concerns do you have today about your diabetes? None     Do you have any of these symptoms? (Select all that apply)  No numbness or tingling in feet.  No redness, sores or blisters on feet.  No complaints of excessive thirst.  No reports of blurry vision.  No significant changes to weight.      BP Readings from Last 2 Encounters:   03/15/22 131/68   10/26/21 114/64     Hemoglobin A1C POCT (%)   Date Value   02/22/2021 6.8 (H)   08/26/2020 6.7 (H)     Hemoglobin A1C (%)   Date Value   03/15/2022 6.9 (H)   09/14/2021 6.8 (H)     LDL Cholesterol Calculated (mg/dL)   Date Value   11/15/2021 57   08/26/2020 68   04/04/2019 72       Hyperlipidemia Follow-Up      Are you regularly taking any medication or supplement to lower your cholesterol?   Yes- statin    Are you having muscle aches or other side effects that you think could be caused by your cholesterol lowering medication?  Yes- cramps at night for the last year    Hypertension Follow-up      Do you check  your blood pressure regularly outside of the clinic? Yes     Are you following a low salt diet? Yes    Are your blood pressures ever more than 140 on the top number (systolic) OR more   than 90 on the bottom number (diastolic), for example 140/90? No      Notes for months worsening slight bilateral hand tremor. Of note, he has attempted to decrease alcohol intake. Denies history of excessive use in the past ,but states now rarely drinks.     He also states for months to years having frequent night time cramps in legs. No treatments tried.       Current providers sharing in care for this patient include:   Patient Care Team:  Kentrell Urias PA-C as PCP - General (Family Medicine)  Yfn Baum MD as Assigned PCP  Ramin Mo MD as MD (Urology)  Ramin Mo MD as Assigned Surgical Provider    The following health maintenance items are reviewed in Epic and correct as of today:  Health Maintenance Due   Topic Date Due     MICROALBUMIN  12/14/2021     FALL RISK ASSESSMENT  01/29/2022     BMP  02/15/2022     HEMOGLOBIN  05/15/2022     BP Readings from Last 3 Encounters:   03/15/22 131/68   10/26/21 114/64   09/14/21 125/68    Wt Readings from Last 3 Encounters:   03/15/22 76.7 kg (169 lb)   10/26/21 74.8 kg (165 lb)   09/14/21 77 kg (169 lb 12.8 oz)                  Patient Active Problem List   Diagnosis     HYPERLIPIDEMIA LDL GOAL <100     BPH (benign prostatic hyperplasia)     Hypertension goal BP (blood pressure) < 140/90     Advanced directives, counseling/discussion     Health Care Home     Type 2 diabetes mellitus without complication (H)     Acute right-sided low back pain with right-sided sciatica     CKD (chronic kidney disease) stage 2, GFR 60-89 ml/min     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     ZZC APPENDECTOMY       ZZC TOTAL HIP ARTHROPLASTY      Hip Replacement, Total       Social History     Tobacco Use     Smoking status: Never Smoker     Smokeless tobacco: Never Used   Substance Use Topics     Alcohol use: Not Currently     Alcohol/week: 0.0 standard drinks     Family History   Problem Relation Age of Onset     Diabetes Mother      Heart Disease Mother      Eye Disorder Mother      Cerebrovascular Disease Father      Musculoskeletal Disorder Sister      Prostate Cancer No family hx of      Cancer - colorectal No family hx of          Current Outpatient Medications   Medication Sig Dispense Refill     albuterol (PROAIR HFA/PROVENTIL HFA/VENTOLIN HFA) 108 (90 Base) MCG/ACT inhaler Inhale 2-4 puffs into the lungs       atorvastatin (LIPITOR) 40 MG tablet TAKE 1 TABLET BY MOUTH EVERY DAY. 90 tablet 1     benzonatate (TESSALON) 200 MG capsule Take 200 mg by mouth       albuterol (PROAIR HFA/PROVENTIL HFA/VENTOLIN HFA) 108 (90 Base) MCG/ACT inhaler INHALE 2 TO 4 PUFFS BY MOUTH EVERY 4 HOURS AS NEEDED FOR COUGH       ASPIRIN NOT PRESCRIBED (INTENTIONAL) Please choose reason not prescribed, below       blood glucose (CONTOUR TEST) test strip TEST once DAILY 100 strip 3     EPINEPHrine (ANY BX GENERIC EQUIV) 0.3 MG/0.3ML injection 2-pack Inject 0.3 mLs (0.3 mg) into the muscle once as needed (allergic state, sequela) 0.6 mL 3     lisinopril (ZESTRIL) 20 MG tablet TAKE 1 TABLET BY MOUTH EVERY DAY 90 tablet 1     MULTI-VITAMIN OR TABS 1 tablet daily       Allergies   Allergen Reactions     Nsaids      aleve, ibuprofen     hives and swelling      Recent Labs   Lab Test 03/15/22  0937 11/15/21  0905 09/14/21  1012 02/22/21  1039 08/26/20  0937 08/26/20  0901 11/01/19  1105 04/04/19  1115 12/06/17  1043 06/14/17  1043   A1C 6.9*  --  6.8* 6.8*  --    < > 6.2* 6.3*   < > 6.5*   LDL  --  57  --   --  68  --   --  72   < >  --    HDL  --  51  --   --  54  --   --  55   < >  --    TRIG  --  73  --   --  73  --   --  67   < >  --    ALT  --  19  --   --  27  --  22 16   < >  --    CR  --  1.09  --   --  1.13  --  1.06 1.13   < > 1.06   GFRESTIMATED  --  66  --   --  63  --  69 64   < > 69   GFRESTBLACK  --   --   --   --  73  --  79 74   < > 83   POTASSIUM  --  4.5  --   --  5.0  --  4.5 4.8   < > 4.6   TSH  --   --   --   --   --   --  1.24  --   --  1.32    < > = values in this interval not displayed.      Pneumonia Vaccine:Adults age 65+ who received Pneumovax (PPSV23) at 65 years or older: Should be given PCV13 > 1 year after their most recent PPSV23        Review of Systems   Constitutional: Negative for chills and fever.   HENT: Positive for hearing loss. Negative for congestion, ear pain and sore throat.    Eyes: Negative for pain and visual disturbance.   Respiratory: Negative for cough and shortness of breath.    Cardiovascular: Negative for chest pain, palpitations and peripheral edema.   Gastrointestinal: Negative for abdominal pain, constipation, diarrhea, heartburn, hematochezia and nausea.   Genitourinary: Positive for impotence. Negative for dysuria, frequency, genital sores, hematuria, penile discharge and urgency.   Musculoskeletal: Positive for arthralgias. Negative for joint swelling and myalgias.   Skin: Negative for rash.   Neurological: Negative for dizziness, weakness, headaches and paresthesias.   Psychiatric/Behavioral: Negative for mood changes. The patient is not nervous/anxious.      Constitutional, HEENT, cardiovascular, pulmonary, GI, , musculoskeletal, neuro, skin, endocrine and psych systems are negative, except as otherwise  "noted.    OBJECTIVE:   /68 (BP Location: Right arm, Patient Position: Chair, Cuff Size: Adult Regular)   Pulse 66   Temp 97.5  F (36.4  C) (Oral)   Ht 1.651 m (5' 5\")   Wt 76.7 kg (169 lb)   SpO2 98%   BMI 28.12 kg/m   Estimated body mass index is 28.12 kg/m  as calculated from the following:    Height as of this encounter: 1.651 m (5' 5\").    Weight as of this encounter: 76.7 kg (169 lb).  Physical Exam  GENERAL: healthy, alert and no distress  RESP: lungs clear to auscultation - no rales, rhonchi or wheezes  CV: regular rates and rhythm, normal S1 S2, no S3 or S4 and no murmur, click or rub  MS: no gross musculoskeletal defects noted, no edema  SKIN: no suspicious lesions or rashes  NEURO: Normal strength and tone, sensory exam grossly normal, mentation intact and DTR's abnormal: absent on right patellar. Remaining normal. Minimal tremor in fingers with outstretched hands bilaterally.   PSYCH: mentation appears normal, affect normal/bright    Diagnostic Test Results:  none     ASSESSMENT / PLAN:   (Z00.00) Encounter for Medicare annual wellness exam  (primary encounter diagnosis)  Comment: stable wellness.   Plan: Albumin Random Urine Quantitative with Creat         Ratio, BASIC METABOLIC PANEL, CANCELED:         Parathyroid Hormone Intact            (E11.9) Type 2 diabetes mellitus without complication, without long-term current use of insulin (H)  Comment: stable and controlled. Will maintain and recheck in 6 months.  Continue life style mods.   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 Recommending against this. Will stop gemfibrozil and will recheck lipids in 6 months. Magnesium and thyroid " "pending as well. If work up negative and cramps not improved after medication change, will have return prn.   Plan: Magnesium            (G25.0) Essential tremor  Comment: present on exam and history. Reassured. Likely more apparent given no alcohol use. Reassured and monitoring recommended.   Plan: OFFICE/OUTPT VISIT,EST,LEVL III            (R29.2) Hyporeflexia  Comment: unclear cause. Possibly due to possible underlying knee OA. Remaining reflexes normal. Thyroid pending. Strengths normal. If thyroid normal, continue to monitor.   Plan: TSH with free T4 reflex           COUNSELING:  Reviewed preventive health counseling, as reflected in patient instructions       Regular exercise       Healthy diet/nutrition    Estimated body mass index is 28.12 kg/m  as calculated from the following:    Height as of this encounter: 1.651 m (5' 5\").    Weight as of this encounter: 76.7 kg (169 lb).        He reports that he has never smoked. He has never used smokeless tobacco.      Appropriate preventive services were discussed with this patient, including applicable screening as appropriate for cardiovascular disease, diabetes, osteopenia/osteoporosis, and glaucoma.  As appropriate for age/gender, discussed screening for colorectal cancer, prostate cancer, breast cancer, and cervical cancer. Checklist reviewing preventive services available has been given to the patient.    Reviewed patients plan of care and provided an AVS. The Basic Care Plan (routine screening as documented in Health Maintenance) for Sushant meets the Care Plan requirement. This Care Plan has been established and reviewed with the Patient.    Counseling Resources:  ATP IV Guidelines  Pooled Cohorts Equation Calculator  Breast Cancer Risk Calculator  Breast Cancer: Medication to Reduce Risk  FRAX Risk Assessment  ICSI Preventive Guidelines  Dietary Guidelines for Americans, 2010  USDA's MyPlate  ASA Prophylaxis  Lung CA Screening    Kentrell Urias, " SYLVIA DE LOS SANTOS [facility]    Identified Health Risks:

## 2022-03-15 NOTE — PATIENT INSTRUCTIONS
Patient Education   Personalized Prevention Plan  You are due for the preventive services outlined below.  Your care team is available to assist you in scheduling these services.  If you have already completed any of these items, please share that information with your care team to update in your medical record.  Health Maintenance Due   Topic Date Due     Parathyroid Lab  Never done     Kidney Microalbumin Urine Test  12/14/2021     Annual Wellness Visit  01/29/2022     FALL RISK ASSESSMENT  01/29/2022     Basic Metabolic Panel  02/15/2022     A1C Lab  03/14/2022     Hemoglobin  05/15/2022        Patient Education     Essential Tremor (ET)  What is essential tremor?  Essential tremor (ET) is a neurological disorder that causes your hands, head, trunk, voice, and/or legs to shake rhythmically. It is often confused with Parkinson disease.  ET is the most common trembling disorder that people experience. Everyone has some ET, but the movements usually cannot be seen or felt. When tremors are noticeable, the condition is classified as ET.  ET is most common among people older than age 65, but it can affect people at any age.  What causes ET?  ET can occur in different people for different reasons:    Familial essential tremor. In most people, the condition seems to be passed down from a parent to a child. If your parent has ET, there is a 50% chance that you or your children will inherit the gene responsible for the condition.    Essential tremor related to another disorder. Sometimes, a tremor is a symptom of another neurological disorder, such as Parkinson disease or dystonia. Sometimes, ET is mistaken for these other diseases when they are not present. A health care provider s careful diagnosis is extremely important.  The cause of ET isn t known. However, one theory suggests that your cerebellum and other parts of your brain are not communicating correctly. The cerebellum is a part of the brain that controls  muscle coordination.  What are the symptoms of ET?  If you have ET, you will experience shaking and trembling at different times and in different situations, but some characteristics are common to all. Here is what you might typically experience:    Tremors occur when you move and are less noticeable when you rest.    Certain medications, caffeine, or stress can make your tremors worse.    Tremors get worse as you age.    Tremors don t affect both sides of your body in the same way.  Here are different signs of essential tremor:    Tremors that are most obvious in your hands    Difficulty performing tasks with your hands, such as writing or using tools    Shaking or quivering sound in your voice    Uncontrollable head-nodding    In rare instances, tremors in your legs or feet  How is ET diagnosed?  Your rapid, uncontrollable trembling, as well as questions about your medical and family history, can help your health care provider determine if you have familial ET. He or she will probably need to rule out other conditions that could cause shaking or trembling. For example, tremors could be symptoms of diseases, such as hyperthyroidism. Your health care provider might test you for those, as well.  In some cases, the tremors might be related to other factors. To find out for certain, your health care provider may have you try to:    Abstain from alcohol; if you re an alcoholic, trembling is a common symptom.    Cut out cigarette smoking.    Avoid caffeine.    Avoid certain medications  How is ET treated?   Propanolol and primidone are 2 medications often prescribed to treat ET. Propanolol blocks the stimulating action of neurotransmitters to calm your trembling. Primidone is a common antiseizure medication that also controls the actions of neurotransmitters.  Gabapentin and topiramate are 2 other antiseizure medications that are sometimes prescribed. In some cases, tranquilizers like alprazolam or clonazepam might be  suggested.  For ET in your hands, botulinum toxin (Botox) injections have shown some promise in easing the trembling. They work by weakening the surrounding muscles around your hands. For severe tremors, a stimulating device (deep brain stimulator) surgically implanted in your brain may help.  Can ET be prevented?   The specific cause of ET is not known, so scientists are not sure how the condition can be prevented.  Living with ET  ET is usually not dangerous, but it can certainly be frustrating if you have to deal with it. Certain factors can make tremors worse, so the following steps may help to decrease tremors:    Avoid alcohol, cigarettes, and caffeine    Avoid stressful situations as much as possible    Use relaxation techniques, such as yoga, deep-breathing exercises, or biofeedback    Check with your health care provider to see if any medications you re taking could be making your tremors worse.  Talk with your health care provider about other options, such as surgery, if ET starts to affect your quality of life.  When should I call my health care provider?  If you have been diagnosed with ET, talk with your health care provider about when you might need to call. He or she will likely advise you to call if your tremors become worse, or if you develop new neurologic symptoms, such as numbness or weakness.  Key points    ET is a neurological disorder that causes your hands, head, trunk, voice, or legs to shake rhythmically. The cause is not known, but it is often passed down from a parent to a child.    ET is sometimes confused with other types of tremor, so getting the right diagnosis is important.    Tremors tend to be worse during movement than when at rest. The tremors are usually not dangerous, but they can get worse over time.    Avoiding things that might make tremors worse, such as stress, caffeine, and certain medications, may be helpful. Medicines can also help control or limit tremors in some  people. Severe tremors can sometimes be treated with surgery.  Next steps  Tips to help you get the most from a visit to your health care provider:    Before your visit, write down questions you want answered.    Bring someone with you to help you ask questions and remember what your provider tells you.    At the visit, write down the names of new medicines, treatments, or tests, and any new instructions your provider gives you.    If you have a follow-up appointment, write down the date, time, and purpose for that visit.    Know how you can contact your provider if you have questions.  Essential Tremor (ET)  What is essential tremor?  Essential tremor (ET) is a neurological disorder that causes your hands, head, trunk, voice, and/or legs to shake rhythmically. It is often confused with Parkinson disease.  ET is the most common trembling disorder that people experience. Everyone has some ET, but the movements usually cannot be seen or felt. When tremors are noticeable, the condition is classified as ET.  ET is most common among people older than age 65, but it can affect people at any age.  What causes ET?  ET can occur in different people for different reasons:    Familial essential tremor. In most people, the condition seems to be passed down from a parent to a child. If your parent has ET, there is a 50% chance that you or your children will inherit the gene responsible for the condition.    Essential tremor related to another disorder. Sometimes, a tremor is a symptom of another neurological disorder, such as Parkinson disease or dystonia. Sometimes, ET is mistaken for these other diseases when they are not present. A health care provider s careful diagnosis is extremely important.  The cause of ET isn t known. However, one theory suggests that your cerebellum and other parts of your brain are not communicating correctly. The cerebellum is a part of the brain that controls muscle coordination.  What are the  symptoms of ET?  If you have ET, you will experience shaking and trembling at different times and in different situations, but some characteristics are common to all. Here is what you might typically experience:    Tremors occur when you move and are less noticeable when you rest.    Certain medications, caffeine, or stress can make your tremors worse.    Tremors get worse as you age.    Tremors don t affect both sides of your body in the same way.  Here are different signs of essential tremor:    Tremors that are most obvious in your hands    Difficulty performing tasks with your hands, such as writing or using tools    Shaking or quivering sound in your voice    Uncontrollable head-nodding    In rare instances, tremors in your legs or feet  How is ET diagnosed?  Your rapid, uncontrollable trembling, as well as questions about your medical and family history, can help your health care provider determine if you have familial ET. He or she will probably need to rule out other conditions that could cause shaking or trembling. For example, tremors could be symptoms of diseases, such as hyperthyroidism. Your health care provider might test you for those, as well.  In some cases, the tremors might be related to other factors. To find out for certain, your health care provider may have you try to:    Abstain from alcohol; if you re an alcoholic, trembling is a common symptom.    Cut out cigarette smoking.    Avoid caffeine.    Avoid certain medications  How is ET treated?   Propanolol and primidone are 2 medications often prescribed to treat ET. Propanolol blocks the stimulating action of neurotransmitters to calm your trembling. Primidone is a common antiseizure medication that also controls the actions of neurotransmitters.  Gabapentin and topiramate are 2 other antiseizure medications that are sometimes prescribed. In some cases, tranquilizers like alprazolam or clonazepam might be suggested.  For ET in your hands,  botulinum toxin (Botox) injections have shown some promise in easing the trembling. They work by weakening the surrounding muscles around your hands. For severe tremors, a stimulating device (deep brain stimulator) surgically implanted in your brain may help.  Can ET be prevented?   The specific cause of ET is not known, so scientists are not sure how the condition can be prevented.  Living with ET  ET is usually not dangerous, but it can certainly be frustrating if you have to deal with it. Certain factors can make tremors worse, so the following steps may help to decrease tremors:    Avoid alcohol, cigarettes, and caffeine    Avoid stressful situations as much as possible    Use relaxation techniques, such as yoga, deep-breathing exercises, or biofeedback    Check with your health care provider to see if any medications you re taking could be making your tremors worse.  Talk with your health care provider about other options, such as surgery, if ET starts to affect your quality of life.  When should I call my health care provider?  If you have been diagnosed with ET, talk with your health care provider about when you might need to call. He or she will likely advise you to call if your tremors become worse, or if you develop new neurologic symptoms, such as numbness or weakness.  Key points    ET is a neurological disorder that causes your hands, head, trunk, voice, or legs to shake rhythmically. The cause is not known, but it is often passed down from a parent to a child.    ET is sometimes confused with other types of tremor, so getting the right diagnosis is important.    Tremors tend to be worse during movement than when at rest. The tremors are usually not dangerous, but they can get worse over time.    Avoiding things that might make tremors worse, such as stress, caffeine, and certain medications, may be helpful. Medicines can also help control or limit tremors in some people. Severe tremors can sometimes be  treated with surgery.  Next steps  Tips to help you get the most from a visit to your health care provider:    Before your visit, write down questions you want answered.    Bring someone with you to help you ask questions and remember what your provider tells you.    At the visit, write down the names of new medicines, treatments, or tests, and any new instructions your provider gives you.    If you have a follow-up appointment, write down the date, time, and purpose for that visit.    Know how you can contact your provider if you have questions.    1577-0976 [employer]. [address] [place] [address]. All rights reserved. This information is not intended as a substitute for professional medical care. Always follow your healthcare professional's instructions.

## 2022-03-16 LAB
CREAT UR-MCNC: 103 MG/DL
MICROALBUMIN UR-MCNC: 12 MG/L
MICROALBUMIN/CREAT UR: 11.65 MG/G CR (ref 0–17)

## 2022-03-20 LAB
MAGNESIUM SERPL-MCNC: 2.1 MG/DL (ref 1.6–2.3)
TSH SERPL DL<=0.005 MIU/L-ACNC: 2.13 MU/L (ref 0.4–4)

## 2022-03-21 ENCOUNTER — TELEPHONE (OUTPATIENT)
Dept: FAMILY MEDICINE | Facility: CLINIC | Age: 77
End: 2022-03-21
Payer: MEDICARE

## 2022-03-21 NOTE — TELEPHONE ENCOUNTER
See earlier encounters, faxed note back informing [facility] per ZRAYMOND, we have filled out forms, should be good, call us if questions    Received another CMN form, no changes, see multiple forms filled out in Nov and Dec, faxed note back for [facility] to update their system per Tan, should only need to be done annually  Radha Frazier RN, BSN

## 2022-05-13 ENCOUNTER — IMMUNIZATION (OUTPATIENT)
Dept: NURSING | Facility: CLINIC | Age: 77
End: 2022-05-13
Payer: MEDICARE

## 2022-05-13 PROCEDURE — 0054A COVID-19,PF,PFIZER (12+ YRS): CPT

## 2022-05-13 PROCEDURE — 91305 COVID-19,PF,PFIZER (12+ YRS): CPT

## 2022-06-06 ENCOUNTER — TELEPHONE (OUTPATIENT)
Dept: FAMILY MEDICINE | Facility: CLINIC | Age: 77
End: 2022-06-06
Payer: MEDICARE

## 2022-06-06 DIAGNOSIS — E11.9 TYPE 2 DIABETES MELLITUS WITHOUT COMPLICATION (H): Primary | ICD-10-CM

## 2022-06-06 NOTE — TELEPHONE ENCOUNTER
Please call Sushant, he needs a new diabetes monitor states pharmacy can not get the strips for his current monitor.  [phone].

## 2022-06-07 NOTE — TELEPHONE ENCOUNTER
Called pt, DM supplies sent  Prescription approved per [facility] Refill Protocol.  Radha Frazier RN, BSN  [facility]

## 2022-06-12 DIAGNOSIS — E78.5 HYPERLIPIDEMIA LDL GOAL <100: ICD-10-CM

## 2022-06-13 RX ORDER — ATORVASTATIN CALCIUM 40 MG/1
TABLET, FILM COATED ORAL
Qty: 90 TABLET | Refills: 0 | Status: SHIPPED | OUTPATIENT
Start: 2022-06-13 | End: 2022-09-12

## 2022-06-13 NOTE — TELEPHONE ENCOUNTER
Prescription approved per [facility] Refill Protocol.    Adeola Francisco RN

## 2022-07-12 ENCOUNTER — TELEPHONE (OUTPATIENT)
Dept: FAMILY MEDICINE | Facility: CLINIC | Age: 77
End: 2022-07-12

## 2022-07-12 ENCOUNTER — MEDICAL CORRESPONDENCE (OUTPATIENT)
Dept: HEALTH INFORMATION MANAGEMENT | Facility: CLINIC | Age: 77
End: 2022-07-12

## 2022-07-12 NOTE — TELEPHONE ENCOUNTER
Medicare CMN form from [facility] received, routed to B, please sign, give to TC to fax  Radha Frazier RN, BSN  [facility]

## 2022-07-27 DIAGNOSIS — I10 ESSENTIAL HYPERTENSION, BENIGN: ICD-10-CM

## 2022-07-28 RX ORDER — LISINOPRIL 20 MG/1
TABLET ORAL
Qty: 90 TABLET | Refills: 0 | Status: SHIPPED | OUTPATIENT
Start: 2022-07-28 | End: 2022-10-25

## 2022-07-28 NOTE — TELEPHONE ENCOUNTER
Last refill until visit  Prescription approved per [facility] Refill Protocol.  Radha Frazier RN, BSN  [facility]

## 2022-08-24 ENCOUNTER — OFFICE VISIT (OUTPATIENT)
Dept: FAMILY MEDICINE | Facility: CLINIC | Age: 77
End: 2022-08-24
Payer: MEDICARE

## 2022-08-24 VITALS
OXYGEN SATURATION: 96 % | HEART RATE: 83 BPM | SYSTOLIC BLOOD PRESSURE: 110 MMHG | DIASTOLIC BLOOD PRESSURE: 50 MMHG | RESPIRATION RATE: 12 BRPM | BODY MASS INDEX: 28.29 KG/M2 | WEIGHT: 170 LBS | TEMPERATURE: 98.2 F

## 2022-08-24 DIAGNOSIS — M25.512 CHRONIC LEFT SHOULDER PAIN: ICD-10-CM

## 2022-08-24 DIAGNOSIS — G89.29 CHRONIC LEFT SHOULDER PAIN: ICD-10-CM

## 2022-08-24 DIAGNOSIS — M26.609 TMJ (TEMPOROMANDIBULAR JOINT SYNDROME): ICD-10-CM

## 2022-08-24 DIAGNOSIS — E11.9 TYPE 2 DIABETES MELLITUS WITHOUT COMPLICATION, WITHOUT LONG-TERM CURRENT USE OF INSULIN (H): Primary | ICD-10-CM

## 2022-08-24 LAB
ANION GAP SERPL CALCULATED.3IONS-SCNC: 10 MMOL/L (ref 3–14)
BUN SERPL-MCNC: 28 MG/DL (ref 7–30)
CALCIUM SERPL-MCNC: 9.3 MG/DL (ref 8.5–10.1)
CHLORIDE BLD-SCNC: 105 MMOL/L (ref 94–109)
CO2 SERPL-SCNC: 21 MMOL/L (ref 20–32)
CREAT SERPL-MCNC: 1.19 MG/DL (ref 0.66–1.25)
GFR SERPL CREATININE-BSD FRML MDRD: 63 ML/MIN/1.73M2
GLUCOSE BLD-MCNC: 173 MG/DL (ref 70–99)
HBA1C MFR BLD: 7.5 % (ref 0–5.6)
HOLD SPECIMEN: NORMAL
POTASSIUM BLD-SCNC: 4.4 MMOL/L (ref 3.4–5.3)
SODIUM SERPL-SCNC: 136 MMOL/L (ref 133–144)

## 2022-08-24 PROCEDURE — 83036 HEMOGLOBIN GLYCOSYLATED A1C: CPT | Performed by: PHYSICIAN ASSISTANT

## 2022-08-24 PROCEDURE — 99207 PR FOOT EXAM NO CHARGE: CPT | Mod: 25 | Performed by: PHYSICIAN ASSISTANT

## 2022-08-24 PROCEDURE — 80048 BASIC METABOLIC PNL TOTAL CA: CPT | Performed by: PHYSICIAN ASSISTANT

## 2022-08-24 PROCEDURE — 36415 COLL VENOUS BLD VENIPUNCTURE: CPT | Performed by: PHYSICIAN ASSISTANT

## 2022-08-24 PROCEDURE — 99214 OFFICE O/P EST MOD 30 MIN: CPT | Performed by: PHYSICIAN ASSISTANT

## 2022-08-24 RX ORDER — CYCLOBENZAPRINE HCL 5 MG
5 TABLET ORAL
Qty: 30 TABLET | Refills: 0 | Status: SHIPPED | OUTPATIENT
Start: 2022-08-24 | End: 2022-08-24

## 2022-08-24 RX ORDER — TIZANIDINE 2 MG/1
2 TABLET ORAL 3 TIMES DAILY PRN
Qty: 30 TABLET | Refills: 0 | Status: SHIPPED | OUTPATIENT
Start: 2022-08-24 | End: 2022-12-12

## 2022-08-24 RX ORDER — METFORMIN HCL 500 MG
500 TABLET, EXTENDED RELEASE 24 HR ORAL
Qty: 90 TABLET | Refills: 1 | Status: SHIPPED | OUTPATIENT
Start: 2022-08-24 | End: 2022-12-12

## 2022-08-24 NOTE — PROGRESS NOTES
Assessment & Plan     Encounter for Medicare annual wellness exam  Stable exam.  - BASIC METABOLIC PANEL    Type 2 diabetes mellitus without complication, without long-term current use of insulin (H)  A1C increased from 6.9% to 7.5%. He has noticed an increase in his fasting blood sugars over the past couple months. With the increase in his A1C discussed ongoing dietary management vs medication treatment. Patient is in agreement with medication after discussion. Will start him on Metformin 500 mg with dinner and continue to have him check his sugars in the morning. Will have him follow up in 3-4 months for repeat A1C and diabetes check.   - Hemoglobin A1c  - metFORMIN (GLUCOPHAGE XR) 500 MG 24 hr tablet; Take 1 tablet (500 mg) by mouth daily (with dinner)    Chronic left shoulder pain  Was seen by [facility] in the past for this and was recommended to physical therapy and follow up if ongoing issue. Given he continues to have notable pain and it is affecting his sleep, will refer him back to Orthopedics for evaluation and discussion options for treatment.  - Orthopedic  Referral; Future    TMJ (temporomandibular joint syndrome)  Likely aggravated with sleeping position 9 days ago. Symptoms already improving but will give him Flexeril to aid with any residual pain/symptoms.   - cyclobenzaprine (FLEXERIL) 5 MG tablet; Take 1 tablet (5 mg) by mouth nightly as needed for muscle spasms      Return in about 4 months (around 12/24/2022) for diabetes check.   Follow-up Visit   Expected date:  Nov 24, 2022 (Approximate)      Follow Up Appointment Details:     Follow-up with whom?: PCP    Follow-Up for what?: Chronic Disease f/u    Chronic Disease f/u: Diabetes    How?: In Person or Virtual                    JITENDRA ChangS  [facility] [employer] [facility]    [facility] [place]   Sushant Zepeda is a 77 year old presenting for the following health issues:  Diabetes and Urgent Care  (Rt jaw pain, went to the urgency room week ago, hard to eat, pain radiates up the ear. Pain was so bad was hard to chew. Was on steroids for 5 days unsure if helped)    History of Present Illness       Diabetes:   He presents for follow up of diabetes.  He is checking home blood glucose one time daily. He checks blood glucose before meals.  Blood glucose is never over 200 and never under 70. He is aware of hypoglycemia symptoms including dizziness. He has no concerns regarding his diabetes at this time.  He is having weight gain.         He eats 2-3 servings of fruits and vegetables daily.He consumes 0 sweetened beverage(s) daily.He exercises with enough effort to increase his heart rate 10 to 19 minutes per day.  He exercises with enough effort to increase his heart rate 3 or less days per week.   He is taking medications regularly.  Fasting AM blood sugars were in the 100-120 range but now over the past few months they have been increasing slightly and a normal AM fasting sugar is now 140. He did have some high readings in the last week when he was on steroids for jaw pain also. Denies increased urination or other symptoms. Currently diabetes is diet controlled.     Jaw Pain: Patient woke up with right jaw pain last Monday (~9 days ago) and his pain was so severe it was difficult for him to open his mouth or chew food. He was seen in  and they felt possible TMJ so started him on steroids and he also was placed PCN for possible dental infection. He was seen by Dentistry and they felt it was likely TMJ or arthritis of the jaw vs infection. He feels the pain has been improving but he does continue to get pain on the right side with frequent use of the jaw. Denies ear pain, headaches or other acute concerns.     Left foot pain: Noticed a small painful bump to the bottom of the left foot 2 months ago. Symptoms have mostly resolved. Now having no pain. Unsure if he stepped on something or got a bug bite. No history  "of similar symptoms.    Of note, Sushant is wondering what the process is for approval for medical marijuana for \"arthritis\" pains. He used to use NSAIDs with good improvement in his arthritis like pains, but he had an allergic reaction to NSAIDs and can no longer take these.     Review of Systems   Constitutional, HEENT, cardiovascular, pulmonary, gi and gu systems are negative, except as otherwise noted.        Objective    /50 (BP Location: Right arm, Patient Position: Chair, Cuff Size: Adult Regular)   Pulse 83   Temp 98.2  F (36.8  C) (Oral)   Resp 12   Wt 77.1 kg (170 lb)   SpO2 96%   BMI 28.29 kg/m    Body mass index is 28.29 kg/m .  Physical Exam   GENERAL: healthy, alert and no distress  HENT: ear canals and TM's normal, nose and mouth without ulcers or lesions, pain with deep palpation over the right TMJ. No clicking or popping with jaw movement.   NECK: no asymmetry, masses, or scars  RESP: lungs clear to auscultation - no rales, rhonchi or wheezes  CV: regular rate and rhythm, normal S1 S2, no S3 or S4, no murmur  MS: no gross musculoskeletal defects noted, no edema, left foot without evidence of rash or lesions. No pain with palpation to the plantar surface of the left foot.  PSYCH: mentation appears normal, affect normal/bright    "

## 2022-08-24 NOTE — PROGRESS NOTES
Assessment & Plan     Type 2 diabetes mellitus without complication, without long-term current use of insulin (H)  Slightly worsening despite ongoing diet and exercise. Likely progressive insulin resistance. Recommending 500 mg of metformin and recheck in 3 months.   - Hemoglobin A1c; Future  - Hemoglobin A1c  - metFORMIN (GLUCOPHAGE XR) 500 MG 24 hr tablet; Take 1 tablet (500 mg) by mouth daily (with dinner)  - FOOT EXAM  -Medication use and side effects discussed with the patient. Patient is in complete understanding and agreement with plan.       Chronic left shoulder pain  Ongoing for years. Recommend first consulting with ortho again given limited treatment and evaluation in recent past. If deemed  No further treatment needed, will have see pain clinic to consult on possible marijuana certification.   - Orthopedic  Referral; Future    TMJ (temporomandibular joint syndrome)  Present on history. Given no past history and acute onset with waking with improvement, felt likely muscular in etiology over arthritis though arthritis is possible. Recommend prn at bedtime flexeril with soft foods until resolved. If not resolved in 2 weeks, will have see TMJ clinic.   - cyclobenzaprine (FLEXERIL) 5 MG tablet; Take 1 tablet (5 mg) by mouth nightly as needed for muscle spasms  -Medication use and side effects discussed with the patient. Patient is in complete understanding and agreement with plan.       Return in about 4 months (around 12/24/2022) for diabetes check.   Follow-up Visit   Expected date:  Nov 24, 2022 (Approximate)      Follow Up Appointment Details:     Follow-up with whom?: PCP    Follow-Up for what?: Chronic Disease f/u    Chronic Disease f/u: Diabetes    How?: In Person or Virtual                    Kentrell Urias PA-C  [facility]   Sushant Zepeda is a 77 year old accompanied by his self, presenting for the following health issues:  Diabetes and  Urgent Care (Rt jaw pain, went to the urgency room week ago, hard to eat, pain radiates up the ear. Pain was so bad was hard to chew. Was on steroids for 5 days unsure if helped)      History of Present Illness       Diabetes:   He presents for follow up of diabetes.  He is checking home blood glucose one time daily. He checks blood glucose before meals.  Blood glucose is never over 200 and never under 70. He is aware of hypoglycemia symptoms including dizziness. He has no concerns regarding his diabetes at this time.  He is having weight gain.         He eats 2-3 servings of fruits and vegetables daily.He consumes 0 sweetened beverage(s) daily.He exercises with enough effort to increase his heart rate 10 to 19 minutes per day.  He exercises with enough effort to increase his heart rate 3 or less days per week.   He is taking medications regularly.     Fasting AM blood sugars were in the 100-120 range but now over the past few months they have been increasing slightly and a normal AM fasting sugar is now 140. He did have some high readings in the last week when he was on steroids for jaw pain also. Denies increased urination or other symptoms. Currently diabetes is diet controlled.      Jaw Pain: Patient woke up with right jaw pain last Monday (~9 days ago) and his pain was so severe it was difficult for him to open his mouth or chew food. He was seen in ER and they felt possible TMJ so started him on steroids   Right side, steroids x 5 days  Started 10 days ago  Feels like it is starting to get better starting today. Occurred one day with waking.      Medical marijuana for pain in bilateral shoulders. Allergy to nsaids. Seen by tco in 2019 where tendonitis was diagnosed and physical therapy started. Continued symptoms and has yet to follow up. Has attempted otc thc with mild to moderate improvement. Only took have a dose of this due to full dose resulted in drowsiness.       Review of Systems   Constitutional,  HEENT, cardiovascular, pulmonary, GI, , musculoskeletal, neuro, skin, endocrine and psych systems are negative, except as otherwise noted.      Objective    /50 (BP Location: Right arm, Patient Position: Chair, Cuff Size: Adult Regular)   Pulse 83   Temp 98.2  F (36.8  C) (Oral)   Resp 12   Wt 77.1 kg (170 lb)   SpO2 96%   BMI 28.29 kg/m    Body mass index is 28.29 kg/m .  Physical Exam   GENERAL: healthy, alert and no distress  RESP: lungs clear to auscultation - no rales, rhonchi or wheezes  CV: regular rates and rhythm  PSYCH: mentation appears normal, affect normal/bright  Diabetic foot exam: normal DP and PT pulses, no trophic changes or ulcerative lesions, normal sensory exam and normal monofilament exam    Results for orders placed or performed in visit on 08/24/22 (from the past 24 hour(s))   Hemoglobin A1c   Result Value Ref Range    Hemoglobin A1C 7.5 (H) 0.0 - 5.6 %   Extra Tube    Narrative    The following orders were created for panel order Extra Tube.  Procedure                               Abnormality         Status                     ---------                               -----------         ------                     Extra Red Top Tube[[id number]]                               Final result               Extra Green Top (Lithium...[[id number]]                                                   Please view results for these tests on the individual orders.   Extra Red Top Tube   Result Value Ref Range    Hold Specimen JIC                  .  ..    Answers for HPI/ROS submitted by the patient on 8/19/2022  Frequency of checking blood sugars:: one time daily  What time of day are you checking your blood sugars : before meals  Have you had any blood sugars above 200?: No  Have you had any blood sugars below 70?: No  Hypoglycemia symptoms:: dizziness  Diabetic concerns:: none  Paraesthesia present:: weight gain  How many servings of fruits and vegetables do you eat daily?: 2-3  On average,  how many sweetened beverages do you drink each day (Examples: soda, juice, sweet tea, etc.  Do NOT count diet or artificially sweetened beverages)?: 0  How many minutes a day do you exercise enough to make your heart beat faster?: 10 to 19  How many days a week do you exercise enough to make your heart beat faster?: 3 or less  How many days per week do you miss taking your medication?: 0

## 2022-08-24 NOTE — TELEPHONE ENCOUNTER
"Per [facility]  Drug not covered by pt plan. They prefer the alternative \"TIZANIDINETABMG\"   Please call or fax to [facility] along with strength, directions and refills.    "

## 2022-09-11 DIAGNOSIS — E78.5 HYPERLIPIDEMIA LDL GOAL <100: ICD-10-CM

## 2022-09-12 RX ORDER — ATORVASTATIN CALCIUM 40 MG/1
TABLET, FILM COATED ORAL
Qty: 90 TABLET | Refills: 0 | Status: SHIPPED | OUTPATIENT
Start: 2022-09-12 | End: 2022-12-09

## 2022-09-12 NOTE — TELEPHONE ENCOUNTER
Prescription approved per [facility] Refill Protocol.  Maria Del Rosario Griffin, RN, BSN, PHN  [facility]

## 2022-09-15 ENCOUNTER — TELEPHONE (OUTPATIENT)
Dept: FAMILY MEDICINE | Facility: CLINIC | Age: 77
End: 2022-09-15

## 2022-09-15 DIAGNOSIS — E11.9 TYPE 2 DIABETES MELLITUS WITHOUT COMPLICATION, WITHOUT LONG-TERM CURRENT USE OF INSULIN (H): Primary | ICD-10-CM

## 2022-09-15 NOTE — TELEPHONE ENCOUNTER
Form placed in pcp basket- complete, sign and fax to [phone]    Izzy RUIZ  [facility]   [facility]

## 2022-09-22 ENCOUNTER — VIRTUAL VISIT (OUTPATIENT)
Dept: FAMILY MEDICINE | Facility: CLINIC | Age: 77
End: 2022-09-22
Payer: MEDICARE

## 2022-09-22 DIAGNOSIS — U07.1 INFECTION DUE TO 2019 NOVEL CORONAVIRUS: Primary | ICD-10-CM

## 2022-09-22 PROCEDURE — 99213 OFFICE O/P EST LOW 20 MIN: CPT | Mod: 95 | Performed by: PHYSICIAN ASSISTANT

## 2022-09-22 NOTE — PATIENT INSTRUCTIONS
Instructions for Patients      What are the symptoms of COVID-19?  Symptoms can include fever, cough, shortness of breath, chills, headache, muscle pain sore throat, fatigue, runny or stuffy nose, and loss of taste and smell. Other less common symptoms include nausea, vomiting, or diarrhea (watery stools).    Know when to call 911. Emergency warning signs include:    Trouble breathing or shortness of breath    Pain or pressure in the chest that doesn't go away    Feeling confused like you haven't felt before, or not being able to wake up    Bluish-colored lips or face    How can I take care of myself?  1. Get lots of rest. Drink extra fluids (unless a doctor has told you not to).  2. Take Tylenol (acetaminophen) for fever or pain. If you have liver or kidney problems, ask your family doctor if it's okay to take Tylenol   Adults:   650 mg (two 325 mg pills or tablets) every 4 to 6 hours, or...   1,000 mg (two 500 mg pills or tablets) every 8 hours as needed.  Note: Don't take more than 3,000 mg in one day. Acetaminophen is found in many medicines (both prescribed and over the counter). Read all labels to be sure you don't take too much.  For children, check the Tylenol bottle for the right dose. The dose is based on the child's age or weight.  3. Take over the counter medicines for your symptoms as needed. Talk to your pharmacist.  4. If you have other health problems (like cancer, heart failure, an organ transplant, or severe kidney disease): Call your specialty clinic if you don't feel better in the next 2 days.    Where can I get more information?     [facility] COVID-19 Resource Hub: www.[facility].org/covid19/     CDC Quarantine & Isolation: https://www.cdc.gov/coronavirus/2019-ncov/your-health/quarantine-isolation.html     CDC - What to Do If You're Sick: https://www.cdc.gov/coronavirus/2019-ncov/if-you-are-sick/index.html    [facility] clinical trials (COVID-19 research studies):  clinicalaffairs.[facility]/n-clinical-trials    [place] Department of Health COVID-19 Public Hotline: 1-382.479.6275

## 2022-09-22 NOTE — PROGRESS NOTES
Sushant Zepeda is a 77 year old who is being evaluated via a billable video visit.      How would you like to obtain your AVS? MyChart  If the video visit is dropped, the invitation should be resent by: Text to cell phone: [phone]  Will anyone else be joining your video visit? No        Assessment & Plan     Infection due to 2019 novel coronavirus  Present on home test. Risk factors met. Unilateral kidney. Discussed with pharmacy who felt safe to not renally dose. If severe symptoms develop go to ER.   - nirmatrelvir and ritonavir (PAXLOVID) therapy pack; Take 3 tablets by mouth 2 times daily for 5 days (Take 2 Nirmatrelvir tablets and 1 Ritonavir tablet twice daily for 5 days)  -Medication use and side effects discussed with the patient. Patient is in complete understanding and agreement with plan.           No follow-ups on file.    Kentrell Urias PA-C  [facility]   Sushant Zepeda is a 77 year old, presenting for the following health issues:  Covid Concern (treatment)      HPI       COVID-19 Symptom Review  How many days ago did these symptoms start? 2 days    Are any of the following symptoms significant for you?    New or worsening difficulty breathing? No    Worsening cough? Yes, it's a dry cough.     Fever or chills? No -yesterday had the chills, no fever    Headache: YES- mild    Sore throat: YES- since Tuesday    Chest pain: No    Diarrhea: No    Body aches? No-yesterday did    What treatments has patient tried? none   Does patient live in a nursing home, group home, or shelter? YES- apartments  Does patient have a way to get food/medications during quarantined? Yes, I have a friend or family member who can help me.        Review of Systems   Constitutional, HEENT, cardiovascular, pulmonary, GI, , musculoskeletal, neuro, skin, endocrine and psych systems are negative, except as otherwise noted.      Objective           Vitals:  No vitals were obtained  today due to virtual visit.    Physical Exam   GENERAL: Healthy, alert and no distress  EYES: Eyes grossly normal to inspection.  No discharge or erythema, or obvious scleral/conjunctival abnormalities.  RESP: No audible wheeze, cough, or visible cyanosis.  No visible retractions or increased work of breathing.    SKIN: Visible skin clear. No significant rash, abnormal pigmentation or lesions.  NEURO: Cranial nerves grossly intact.  Mentation and speech appropriate for age.  PSYCH: Mentation appears normal, affect normal/bright, judgement and insight intact, normal speech and appearance well-groomed.          Video-Visit Details    Video Start Time: 10:05    Type of service:  Video Visit    Video End Time:10:20    Originating Location (pt. Location): Home    Distant Location (provider location):  [facility] [place]     Platform used for Video Visit: [employer]

## 2022-10-04 ENCOUNTER — TELEPHONE (OUTPATIENT)
Dept: FAMILY MEDICINE | Facility: CLINIC | Age: 77
End: 2022-10-04

## 2022-10-04 DIAGNOSIS — E11.9 TYPE 2 DIABETES MELLITUS WITHOUT COMPLICATION, WITHOUT LONG-TERM CURRENT USE OF INSULIN (H): Primary | ICD-10-CM

## 2022-10-04 NOTE — TELEPHONE ENCOUNTER
Received a 2 page fax from [employer]. Diabetes Testing Supplies Order. Form to Tan Urias for review. Ruth Behrens.

## 2022-10-05 ENCOUNTER — MEDICAL CORRESPONDENCE (OUTPATIENT)
Dept: HEALTH INFORMATION MANAGEMENT | Facility: CLINIC | Age: 77
End: 2022-10-05

## 2022-10-23 ENCOUNTER — HEALTH MAINTENANCE LETTER (OUTPATIENT)
Age: 77
End: 2022-10-23

## 2022-10-25 DIAGNOSIS — I10 ESSENTIAL HYPERTENSION, BENIGN: ICD-10-CM

## 2022-10-25 RX ORDER — LISINOPRIL 20 MG/1
TABLET ORAL
Qty: 90 TABLET | Refills: 0 | Status: SHIPPED | OUTPATIENT
Start: 2022-10-25 | End: 2022-12-12

## 2022-10-25 NOTE — TELEPHONE ENCOUNTER
Prescription approved per [facility] Refill Protocol.  Radha Frazier RN, BSN  [facility]

## 2022-10-26 RX ORDER — LISINOPRIL 20 MG/1
TABLET ORAL
Qty: 90 TABLET | Refills: 0 | OUTPATIENT
Start: 2022-10-26

## 2022-10-29 ENCOUNTER — TRANSFERRED RECORDS (OUTPATIENT)
Dept: HEALTH INFORMATION MANAGEMENT | Facility: CLINIC | Age: 77
End: 2022-10-29

## 2022-11-01 ENCOUNTER — THERAPY VISIT (OUTPATIENT)
Dept: PHYSICAL THERAPY | Facility: CLINIC | Age: 77
End: 2022-11-01
Payer: MEDICARE

## 2022-11-01 DIAGNOSIS — G89.29 CHRONIC BILATERAL LOW BACK PAIN WITHOUT SCIATICA: ICD-10-CM

## 2022-11-01 DIAGNOSIS — M54.50 CHRONIC BILATERAL LOW BACK PAIN WITHOUT SCIATICA: ICD-10-CM

## 2022-11-01 DIAGNOSIS — S86.111A: ICD-10-CM

## 2022-11-01 DIAGNOSIS — S86.112A: ICD-10-CM

## 2022-11-01 PROCEDURE — 97161 PT EVAL LOW COMPLEX 20 MIN: CPT | Mod: GP | Performed by: PHYSICAL THERAPIST

## 2022-11-01 PROCEDURE — 97110 THERAPEUTIC EXERCISES: CPT | Mod: GP | Performed by: PHYSICAL THERAPIST

## 2022-11-01 NOTE — PROGRESS NOTES
Physical Therapy Initial Evaluation  Subjective:  The history is provided by the patient. No  was used.   Patient Health History  Sushant Zepeda being seen for lower leg pain.     Problem began: 10/28/2022.   Problem occurred: sleeping   Pain is reported as 3/10 on pain scale.  General health as reported by patient is good.  Pertinent medical history includes: changes in bowel/bladder, diabetes, high blood pressure, kidney disease and osteoarthritis.     Medical allergies: other. Other medical allergies details: n-saids.   Surgeries include:  Other. Other surgery history details: appendicitis, cyst, hip replacement.    Current medications:  High blood pressure medication and other. Other medications details: atorvastatan.    Current occupation is retired.   Primary job tasks include:  Other.   Other job/home tasks details: house work and helping relatives withrepairs.                Therapist Generated HPI Evaluation  Problem details: Pt c/o B calf tightness since 10/28/2022.  Started while sleeping L=R now but initally L>R.  Has tried self stretching past few nights and has noted less cramping..  This morning injuried low back bending/twisting.  Notes intermittent history of calf cramping and LBP over the years.  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         Type of problem:  Bilateral ankles.    This is a recurrent condition.  Condition occurred with:  Insidious onset.  Where condition occurred: for unknown reasons.  Patient reports pain:  Posterior.  Pain is described as aching and is intermittent.  Pain is worse in the P.M..  Since onset symptoms are gradually improving.  Associated symptoms:  Loss of motion/stiffness. Symptoms are exacerbated by lying on the extremity, ascending stairs, descending stairs, walking and standing  and relieved by rest and activity/movement.      Barriers include:  None as reported by patient.                        Objective:    Gait:     Gait Type:  Antalgic   Assistive Devices:  None  Deviations:  Lumbar:  Trunk flexionGeneral Deviations:  Stride length decr    Flexibility/Screens:   Positive screens:  Lumbar    Lower Extremity:  Decreased left lower extremity flexibility:Hamstrings; Gastroc and Soleus    Decreased right lower extremity flexibility:  Hamstrings; Gastroc and Soleus          Ankle/Foot Evaluation  ROM:    AROM:    Dorsiflexion:  Left:   5  Right:   5            PROM:    Dorsiflexion:  Left:    9     Right:   9                 Strength is normal.      PALPATION: normal        FUNCTIONAL TESTS:           Proprioception:  Stork Balance Test: Left: 5-7 sec  Right: 6-7 sec        Lumbar/SI Evaluation  ROM:    AROM Lumbar:   Flexion:          Min/mod loss +  Ext:                    Mod/max loss ++   Side Bend:        Left:  Mod loss +    Right:  Mod/max loss ++  Rotation:           Left:     Right:   Side Glide:        Left:     Right:         Strength: fair core stab recruitment. Abd strength 4-/5          Neural Tension/Mobility:    Left side:  SLR w/DF; Slump and Samuel-Lumbar positive.   Right side:   SLR w/DF; Slump and Samuel-Lumbar positive.                                                       General     ROS    Assessment/Plan:    Patient is a 77 year old male with both sides ankle complaints.    Patient has the following significant findings with corresponding treatment plan.                Diagnosis 1:  B gastroc strain  Pain -  US, directional preference exercise and home program  Decreased ROM/flexibility - manual therapy and therapeutic exercise  Decreased strength - therapeutic exercise and therapeutic activities  Decreased proprioception - neuro re-education and therapeutic activities  Impaired gait - gait training  Impaired muscle performance - neuro re-education  Decreased function - therapeutic activities  Impaired posture - neuro re-education    Therapy Evaluation Codes:   Cumulative Therapy Evaluation is: Low  complexity.    Previous and current functional limitations:  (See Goal Flow Sheet for this information)    Short term and Long term goals: (See Goal Flow Sheet for this information)     Communication ability:  Patient appears to be able to clearly communicate and understand verbal and written communication and follow directions correctly.  Treatment Explanation - The following has been discussed with the patient:   RX ordered/plan of care  Anticipated outcomes  Possible risks and side effects  This patient would benefit from PT intervention to resume normal activities.   Rehab potential is good.    Frequency:  1 X week, once daily  Duration:  for 6 weeks  Discharge Plan:  Achieve all LTG.  Independent in home treatment program.  Reach maximal therapeutic benefit.    Please refer to the daily flowsheet for treatment today, total treatment time and time spent performing 1:1 timed codes.

## 2022-11-01 NOTE — PROGRESS NOTES
[facility]    OUTPATIENT Physical Therapy ORTHOPEDIC EVALUATION  PLAN OF TREATMENT FOR OUTPATIENT REHABILITATION  (COMPLETE FOR INITIAL CLAIMS ONLY)  Patient's Last Name, First Name, M.I.  YOB: 1945  Sushant Zepeda    Provider s Name:  [facility]   Medical Record No.  [id number]   Start of Care Date:  11/01/22   Onset Date:   10/28/22   Treatment Diagnosis:  B leg tightness Medical Diagnosis:     Strain of right gastrocnemius muscle  Strain of gastrocnemius muscle of left lower extremity  Chronic bilateral low back pain without sciatica       Goals:     11/01/22 0500   Body Part   Goals listed below are for B leg tightness   Goal #1   Goal #1 sleeping   Previous Functional Level No restrictions   Current Functional Level 1-2 hours without sleep per night   STG Target Performance Less than 1 hour without sleep per night   Rationale to establish restorative sleep pattern   Due Date 11/22/22   LTG Target Performance Sleep through the night w/o meds   Rationale to establish restorative sleep pattern   Due Date 12/13/22       Therapy Frequency:  1x/week  Predicted Duration of Therapy Intervention:  6 weeks    Derrick De Los Santos, PT                 I CERTIFY THE NEED FOR THESE SERVICES FURNISHED UNDER        THIS PLAN OF TREATMENT AND WHILE UNDER MY CARE .             Physician Signature               Date    X_____________________________________________________                         Certification Date From:  11/01/22   Certification Date To:  12/13/22    Referring Provider:  Odin Bardales    Initial Assessment        See Epic Evaluation SOC Date: 11/01/22

## 2022-11-03 ENCOUNTER — OFFICE VISIT (OUTPATIENT)
Dept: UROLOGY | Facility: CLINIC | Age: 77
End: 2022-11-03
Payer: MEDICARE

## 2022-11-03 VITALS
SYSTOLIC BLOOD PRESSURE: 112 MMHG | BODY MASS INDEX: 27.66 KG/M2 | HEIGHT: 65 IN | DIASTOLIC BLOOD PRESSURE: 60 MMHG | WEIGHT: 166 LBS

## 2022-11-03 DIAGNOSIS — R33.9 URINARY RETENTION: Primary | ICD-10-CM

## 2022-11-03 DIAGNOSIS — N13.5 URETEROPELVIC JUNCTION (UPJ) OBSTRUCTION, LEFT: ICD-10-CM

## 2022-11-03 DIAGNOSIS — N18.2 CKD (CHRONIC KIDNEY DISEASE) STAGE 2, GFR 60-89 ML/MIN: ICD-10-CM

## 2022-11-03 LAB
ALBUMIN UR-MCNC: NEGATIVE MG/DL
APPEARANCE UR: CLEAR
BILIRUB UR QL STRIP: NEGATIVE
COLOR UR AUTO: YELLOW
GLUCOSE UR STRIP-MCNC: NEGATIVE MG/DL
HGB UR QL STRIP: NEGATIVE
KETONES UR STRIP-MCNC: NEGATIVE MG/DL
LEUKOCYTE ESTERASE UR QL STRIP: NEGATIVE
NITRATE UR QL: NEGATIVE
PH UR STRIP: 5.5 [PH] (ref 5–7)
SP GR UR STRIP: 1.01 (ref 1–1.03)
UROBILINOGEN UR STRIP-ACNC: 0.2 E.U./DL

## 2022-11-03 PROCEDURE — 99213 OFFICE O/P EST LOW 20 MIN: CPT | Performed by: STUDENT IN AN ORGANIZED HEALTH CARE EDUCATION/TRAINING PROGRAM

## 2022-11-03 PROCEDURE — 81003 URINALYSIS AUTO W/O SCOPE: CPT | Mod: QW | Performed by: STUDENT IN AN ORGANIZED HEALTH CARE EDUCATION/TRAINING PROGRAM

## 2022-11-03 ASSESSMENT — PAIN SCALES - GENERAL: PAINLEVEL: MILD PAIN (3)

## 2022-11-03 NOTE — PROGRESS NOTES
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 Tries to keep less than 500 ml per catheterization. No complaints of UTI.    PHYSICAL EXAM  Patient is a 77 year old  male   Vitals: Blood pressure 112/60, height 1.651 m (5' 5\"), weight 75.3 kg (166 lb).  Body mass index is 27.62 kg/m .  General Appearance Adult:   Alert, no acute distress, oriented  HENT: throat/mouth:normal, good dentition  Lungs: no respiratory distress, or pursed lip breathing  Heart: No obvious jugular venous distension present  Abdomen: nondistended  Musculoskeltal: extremities normal, no peripheral edema  Skin: no suspicious lesions or rashes  Neuro: Alert, oriented, speech and mentation normal  Psych: affect and mood normal  Gait: Normal  : circ phallus  Normal scrotum  Normal testes  Prostate about 50 g, benign feeling       Latest Reference Range & Units 11/03/22 11:04   Color Urine Colorless, Straw, Light Yellow, Yellow  Yellow   Appearance Urine Clear  Clear   Glucose Urine Negative, 1000 , >=2000 mg/dL Negative   Bilirubin Urine Negative  Negative   Ketones Urine Negative, 160  mg/dL Negative   Specific Gravity Urine 1.003 - 1.035  1.010   pH Urine 5.0 - 7.0  5.5   Protein Albumin Urine Negative, 300 , >=2000 mg/dL Negative   Urobilinogen Urine 0.2, 1.0 E.U./dL 0.2   Nitrite Urine Negative  Negative   Blood Urine Negative  Negative   Leukocyte Esterase Urine Negative  Negative       Ct 12/14/2018 severe left hydronephrosis    ASSESSMENT and PLAN  77 year old male returns today for 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 Has not had any recent imaging of his kidneys. His GFR remains stable. He denies any flank pain. Will not do any reimaging unless patient starts becoming symptomatic (flank pain, complicated UTI, worsening renal function etc)    - continue ISC 4x/day indefinitely  - follow up 1 year with UA      Ramin Mo MD   [facility]  [facility] Phone: [phone]

## 2022-11-03 NOTE — LETTER
"11/3/2022       RE: Sushant Zepeda  [address]  [address] [place] [id number]     Dear Colleague,    Thank you for referring your patient, Sushant Zepeda, to the [facility] [place] [facility]. Please see a copy of my visit note below.    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 Tries to keep less than 500 ml per catheterization. No complaints of UTI.    PHYSICAL EXAM  Patient is a 77 year old  male   Vitals: Blood pressure 112/60, height 1.651 m (5' 5\"), weight 75.3 kg (166 lb).  Body mass index is 27.62 kg/m .  General Appearance Adult:   Alert, no acute distress, oriented  HENT: throat/mouth:normal, good dentition  Lungs: no respiratory distress, or pursed lip breathing  Heart: No obvious jugular venous distension present  Abdomen: nondistended  Musculoskeltal: extremities normal, no peripheral edema  Skin: no suspicious lesions or rashes  Neuro: Alert, oriented, speech and mentation normal  Psych: affect and mood normal  Gait: Normal  : circ phallus  Normal scrotum  Normal testes  Prostate about 50 g, benign feeling       Latest Reference Range & Units 11/03/22 11:04   Color Urine Colorless, Straw, Light Yellow, Yellow  Yellow   Appearance Urine Clear  Clear   Glucose Urine Negative, 1000 , >=2000 mg/dL Negative   Bilirubin Urine Negative  Negative   Ketones Urine Negative, 160  mg/dL Negative   Specific Gravity Urine 1.003 - 1.035  1.010   pH Urine 5.0 - 7.0  5.5   Protein Albumin Urine Negative, 300 , >=2000 mg/dL Negative   Urobilinogen Urine 0.2, 1.0 E.U./dL " 0.2   Nitrite Urine Negative  Negative   Blood Urine Negative  Negative   Leukocyte Esterase Urine Negative  Negative       Ct 12/14/2018 severe left hydronephrosis    ASSESSMENT and PLAN  77 year old male returns today for 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 Has not had any recent imaging of his kidneys. His GFR remains stable. He denies any flank pain. Will not do any reimaging unless patient starts becoming symptomatic (flank pain, complicated UTI, worsening renal function etc)    - continue ISC 4x/day indefinitely  - follow up 1 year with UA      Ramin Mo MD   [facility]rology  [facility] Phone: [phone]

## 2022-11-21 ENCOUNTER — TRANSFERRED RECORDS (OUTPATIENT)
Dept: URGENT CARE | Facility: CLINIC | Age: 77
End: 2022-11-21

## 2022-12-07 DIAGNOSIS — E78.5 HYPERLIPIDEMIA LDL GOAL <100: ICD-10-CM

## 2022-12-08 ENCOUNTER — TRANSFERRED RECORDS (OUTPATIENT)
Dept: HEALTH INFORMATION MANAGEMENT | Facility: CLINIC | Age: 77
End: 2022-12-08

## 2022-12-09 ENCOUNTER — TRANSFERRED RECORDS (OUTPATIENT)
Dept: HEALTH INFORMATION MANAGEMENT | Facility: CLINIC | Age: 77
End: 2022-12-09

## 2022-12-09 LAB — RETINOPATHY: NEGATIVE

## 2022-12-09 RX ORDER — ATORVASTATIN CALCIUM 40 MG/1
TABLET, FILM COATED ORAL
Qty: 90 TABLET | Refills: 0 | Status: SHIPPED | OUTPATIENT
Start: 2022-12-09 | End: 2022-12-12

## 2022-12-09 NOTE — TELEPHONE ENCOUNTER
Prescription approved per [facility] Refill Protocol.    Has appointment in 3 days, needs an LDL repeat.     Gary Rojas RN on 12/9/2022 at 2:22 PM

## 2022-12-12 ENCOUNTER — OFFICE VISIT (OUTPATIENT)
Dept: FAMILY MEDICINE | Facility: CLINIC | Age: 77
End: 2022-12-12
Payer: MEDICARE

## 2022-12-12 VITALS
HEART RATE: 70 BPM | HEIGHT: 65 IN | DIASTOLIC BLOOD PRESSURE: 68 MMHG | TEMPERATURE: 97.4 F | BODY MASS INDEX: 28.89 KG/M2 | SYSTOLIC BLOOD PRESSURE: 132 MMHG | RESPIRATION RATE: 16 BRPM | WEIGHT: 173.4 LBS | OXYGEN SATURATION: 98 %

## 2022-12-12 DIAGNOSIS — E11.9 TYPE 2 DIABETES MELLITUS WITHOUT COMPLICATION, WITHOUT LONG-TERM CURRENT USE OF INSULIN (H): ICD-10-CM

## 2022-12-12 DIAGNOSIS — E78.5 HYPERLIPIDEMIA LDL GOAL <100: ICD-10-CM

## 2022-12-12 DIAGNOSIS — Z13.220 SCREENING FOR HYPERLIPIDEMIA: ICD-10-CM

## 2022-12-12 DIAGNOSIS — N18.2 SECONDARY DIABETES MELLITUS WITH STAGE 2 CHRONIC KIDNEY DISEASE (H): Primary | ICD-10-CM

## 2022-12-12 DIAGNOSIS — G89.29 CHRONIC RIGHT-SIDED LOW BACK PAIN WITH RIGHT-SIDED SCIATICA: ICD-10-CM

## 2022-12-12 DIAGNOSIS — E13.22 SECONDARY DIABETES MELLITUS WITH STAGE 2 CHRONIC KIDNEY DISEASE (H): Primary | ICD-10-CM

## 2022-12-12 DIAGNOSIS — I10 ESSENTIAL HYPERTENSION, BENIGN: ICD-10-CM

## 2022-12-12 DIAGNOSIS — M54.41 CHRONIC RIGHT-SIDED LOW BACK PAIN WITH RIGHT-SIDED SCIATICA: ICD-10-CM

## 2022-12-12 LAB
ALT SERPL W P-5'-P-CCNC: 25 U/L (ref 10–50)
CHOLEST SERPL-MCNC: 131 MG/DL
HBA1C MFR BLD: 6.9 % (ref 0–5.6)
HDLC SERPL-MCNC: 37 MG/DL
HOLD SPECIMEN: NORMAL
LDLC SERPL CALC-MCNC: 53 MG/DL
NONHDLC SERPL-MCNC: 94 MG/DL
TRIGL SERPL-MCNC: 207 MG/DL

## 2022-12-12 PROCEDURE — 99214 OFFICE O/P EST MOD 30 MIN: CPT | Performed by: PHYSICIAN ASSISTANT

## 2022-12-12 PROCEDURE — 80061 LIPID PANEL: CPT | Performed by: PHYSICIAN ASSISTANT

## 2022-12-12 PROCEDURE — 83036 HEMOGLOBIN GLYCOSYLATED A1C: CPT | Performed by: PHYSICIAN ASSISTANT

## 2022-12-12 PROCEDURE — 84460 ALANINE AMINO (ALT) (SGPT): CPT | Performed by: PHYSICIAN ASSISTANT

## 2022-12-12 PROCEDURE — 36415 COLL VENOUS BLD VENIPUNCTURE: CPT | Performed by: PHYSICIAN ASSISTANT

## 2022-12-12 RX ORDER — LISINOPRIL 20 MG/1
20 TABLET ORAL DAILY
Qty: 90 TABLET | Refills: 3 | Status: SHIPPED | OUTPATIENT
Start: 2022-12-12 | End: 2023-08-24

## 2022-12-12 RX ORDER — ATORVASTATIN CALCIUM 40 MG/1
40 TABLET, FILM COATED ORAL DAILY
Qty: 90 TABLET | Refills: 3 | Status: SHIPPED | OUTPATIENT
Start: 2022-12-12 | End: 2023-08-24

## 2022-12-12 RX ORDER — METFORMIN HCL 500 MG
500 TABLET, EXTENDED RELEASE 24 HR ORAL
Qty: 90 TABLET | Refills: 1 | Status: SHIPPED | OUTPATIENT
Start: 2022-12-12 | End: 2023-08-22

## 2022-12-15 ENCOUNTER — TELEPHONE (OUTPATIENT)
Dept: FAMILY MEDICINE | Facility: CLINIC | Age: 77
End: 2022-12-15

## 2022-12-15 NOTE — TELEPHONE ENCOUNTER
Patient Quality Outreach    Patient is due for the following:   Diabetes -  Eye Exam    Next Steps:   No follow up needed at this time.    Type of outreach:    Chart review performed, no outreach needed. and Pt had a diabetic eye exam at [facility] on 12/9/22      Questions for provider review:    None     Zuri Chowdary, [facility]

## 2022-12-19 ENCOUNTER — TRANSFERRED RECORDS (OUTPATIENT)
Dept: HEALTH INFORMATION MANAGEMENT | Facility: CLINIC | Age: 77
End: 2022-12-19

## 2022-12-30 DIAGNOSIS — E11.9 TYPE 2 DIABETES, HBA1C GOAL < 8% (H): ICD-10-CM

## 2022-12-30 NOTE — TELEPHONE ENCOUNTER
Prescription approved per [facility] Refill Protocol.    Alma Blevins RN on 12/30/2022 at 10:47 AM

## 2023-01-17 ENCOUNTER — OFFICE VISIT (OUTPATIENT)
Dept: FAMILY MEDICINE | Facility: CLINIC | Age: 78
End: 2023-01-17
Payer: MEDICARE

## 2023-01-17 VITALS
WEIGHT: 174 LBS | HEART RATE: 78 BPM | HEIGHT: 65 IN | SYSTOLIC BLOOD PRESSURE: 133 MMHG | RESPIRATION RATE: 18 BRPM | BODY MASS INDEX: 28.99 KG/M2 | TEMPERATURE: 98.1 F | OXYGEN SATURATION: 98 % | DIASTOLIC BLOOD PRESSURE: 67 MMHG

## 2023-01-17 DIAGNOSIS — Z01.818 PREOP GENERAL PHYSICAL EXAM: Primary | ICD-10-CM

## 2023-01-17 DIAGNOSIS — E87.1 HYPONATREMIA: ICD-10-CM

## 2023-01-17 DIAGNOSIS — M54.41 CHRONIC RIGHT-SIDED LOW BACK PAIN WITH RIGHT-SIDED SCIATICA: ICD-10-CM

## 2023-01-17 DIAGNOSIS — R94.31 ABNORMAL ELECTROCARDIOGRAM (ECG) (EKG): ICD-10-CM

## 2023-01-17 DIAGNOSIS — N18.2 SECONDARY DIABETES MELLITUS WITH STAGE 2 CHRONIC KIDNEY DISEASE (H): ICD-10-CM

## 2023-01-17 DIAGNOSIS — E11.9 TYPE 2 DIABETES, HBA1C GOAL < 8% (H): ICD-10-CM

## 2023-01-17 DIAGNOSIS — E13.22 SECONDARY DIABETES MELLITUS WITH STAGE 2 CHRONIC KIDNEY DISEASE (H): ICD-10-CM

## 2023-01-17 DIAGNOSIS — R79.89 ELEVATED SERUM CREATININE: ICD-10-CM

## 2023-01-17 DIAGNOSIS — I44.4 LEFT ANTERIOR FASCICULAR BLOCK: ICD-10-CM

## 2023-01-17 DIAGNOSIS — G89.29 CHRONIC RIGHT-SIDED LOW BACK PAIN WITH RIGHT-SIDED SCIATICA: ICD-10-CM

## 2023-01-17 LAB
ANION GAP SERPL CALCULATED.3IONS-SCNC: 14 MMOL/L (ref 7–15)
BUN SERPL-MCNC: 25.4 MG/DL (ref 8–23)
CALCIUM SERPL-MCNC: 9.6 MG/DL (ref 8.8–10.2)
CHLORIDE SERPL-SCNC: 101 MMOL/L (ref 98–107)
CREAT SERPL-MCNC: 1.44 MG/DL (ref 0.67–1.17)
DEPRECATED HCO3 PLAS-SCNC: 20 MMOL/L (ref 22–29)
ERYTHROCYTE [DISTWIDTH] IN BLOOD BY AUTOMATED COUNT: 12.4 % (ref 10–15)
GFR SERPL CREATININE-BSD FRML MDRD: 50 ML/MIN/1.73M2
GLUCOSE SERPL-MCNC: 170 MG/DL (ref 70–99)
HBA1C MFR BLD: 6.9 % (ref 0–5.6)
HCT VFR BLD AUTO: 41.3 % (ref 40–53)
HGB BLD-MCNC: 14.3 G/DL (ref 13.3–17.7)
MCH RBC QN AUTO: 31 PG (ref 26.5–33)
MCHC RBC AUTO-ENTMCNC: 34.6 G/DL (ref 31.5–36.5)
MCV RBC AUTO: 90 FL (ref 78–100)
PLATELET # BLD AUTO: 253 10E3/UL (ref 150–450)
POTASSIUM SERPL-SCNC: 4.9 MMOL/L (ref 3.4–5.3)
RBC # BLD AUTO: 4.61 10E6/UL (ref 4.4–5.9)
SODIUM SERPL-SCNC: 135 MMOL/L (ref 136–145)
WBC # BLD AUTO: 4.4 10E3/UL (ref 4–11)

## 2023-01-17 PROCEDURE — 93000 ELECTROCARDIOGRAM COMPLETE: CPT | Performed by: PHYSICIAN ASSISTANT

## 2023-01-17 PROCEDURE — 83036 HEMOGLOBIN GLYCOSYLATED A1C: CPT | Performed by: PHYSICIAN ASSISTANT

## 2023-01-17 PROCEDURE — 80048 BASIC METABOLIC PNL TOTAL CA: CPT | Performed by: PHYSICIAN ASSISTANT

## 2023-01-17 PROCEDURE — 85027 COMPLETE CBC AUTOMATED: CPT | Performed by: PHYSICIAN ASSISTANT

## 2023-01-17 PROCEDURE — 36415 COLL VENOUS BLD VENIPUNCTURE: CPT | Performed by: PHYSICIAN ASSISTANT

## 2023-01-17 PROCEDURE — 99214 OFFICE O/P EST MOD 30 MIN: CPT | Performed by: PHYSICIAN ASSISTANT

## 2023-01-17 NOTE — Clinical Note
Can we please make sure the cardiology unit reaches out to patient within the next couple of days to schedule stress testing prior to surgery on 2/14/23  Thanks  Tan barton, pac

## 2023-01-17 NOTE — PATIENT INSTRUCTIONS
hold multivitamin 1 week prior to surgery       - ACE/ARB (lisinopril): HOLD due to exceptional risk of hypotension during surgery.    - Statins (cholesterol medication): Continue taking on the day of surgery.    - metformin: HOLD day of surgery.      For informational purposes only. Not to replace the advice of your health care provider. Copyright   ,  [facility]. All rights reserved. Clinically reviewed by Hina Stiles MD. [employer] [id number] - REV .  Preparing for Your Surgery  Getting started  A nurse will call you to review your health history and instructions. They will give you an arrival time based on your scheduled surgery time. Please be ready to share:  Your doctor's clinic name and phone number  Your medical, surgical, and anesthesia history  A list of allergies and sensitivities  A list of medicines, including herbal treatments and over-the-counter drugs  Whether the patient has a legal guardian (ask how to send us the papers in advance)  Please tell us if you're pregnant--or if there's any chance you might be pregnant. Some surgeries may injure a fetus (unborn baby), so they require a pregnancy test. Surgeries that are safe for a fetus don't always need a test, and you can choose whether to have one.   If you have a child who's having surgery, please ask for a copy of Preparing for Your Child's Surgery.    Preparing for surgery  Within 10 to 30 days of surgery: Have a pre-op exam (sometimes called an H&P, or History and Physical). This can be done at a clinic or pre-operative center.  If you're having a , you may not need this exam. Talk to your care team.  At your pre-op exam, talk to your care team about all medicines you take. If you need to stop any medicines before surgery, ask when to start taking them again.  We do this for your safety. Many medicines can make you bleed too much during surgery. Some change how well surgery (anesthesia) drugs work.  Call your  insurance company to let them know you're having surgery. (If you don't have insurance, call [phone].)  Call your clinic if there's any change in your health. This includes signs of a cold or flu (sore throat, runny nose, cough, rash, fever). It also includes a scrape or scratch near the surgery site.  If you have questions on the day of surgery, call your hospital or surgery center.  Eating and drinking guidelines  For your safety: Unless your surgeon tells you otherwise, follow the guidelines below.  Eat and drink as usual until 8 hours before you arrive for surgery. After that, no food or milk.  Drink clear liquids until 2 hours before you arrive. These are liquids you can see through, like water, Gatorade, and Propel Water. They also include plain black coffee and tea (no cream or milk), candy, and breath mints. You can spit out gum when you arrive.  If you drink alcohol: Stop drinking it the night before surgery.  If your care team tells you to take medicine on the morning of surgery, it's okay to take it with a sip of water.  Preventing infection  Shower or bathe the night before and morning of your surgery. Follow the instructions your clinic gave you. (If no instructions, use regular soap.)  Don't shave or clip hair near your surgery site. We'll remove the hair if needed.  Don't smoke or vape the morning of surgery. You may chew nicotine gum up to 2 hours before surgery. A nicotine patch is okay.  Note: Some surgeries require you to completely quit smoking and nicotine. Check with your surgeon.  Your care team will make every effort to keep you safe from infection. We will:  Clean our hands often with soap and water (or an alcohol-based hand rub).  Clean the skin at your surgery site with a special soap that kills germs.  Give you a special gown to keep you warm. (Cold raises the risk of infection.)  Wear special hair covers, masks, gowns and gloves during surgery.  Give antibiotic medicine, if  prescribed. Not all surgeries need antibiotics.  What to bring on the day of surgery  Photo ID and insurance card  Copy of your health care directive, if you have one  Glasses and hearing aids (bring cases)  You can't wear contacts during surgery  Inhaler and eye drops, if you use them (tell us about these when you arrive)  CPAP machine or breathing device, if you use them  A few personal items, if spending the night  If you have . . .  A pacemaker, ICD (cardiac defibrillator) or other implant: Bring the ID card.  An implanted stimulator: Bring the remote control.  A legal guardian: Bring a copy of the certified (court-stamped) guardianship papers.  Please remove any jewelry, including body piercings. Leave jewelry and other valuables at home.  If you're going home the day of surgery  You must have a responsible adult drive you home. They should stay with you overnight as well.  If you don't have someone to stay with you, and you aren't safe to go home alone, we may keep you overnight. Insurance often won't pay for this.  After surgery  If it's hard to control your pain or you need more pain medicine, please call your surgeon's office.  Questions?   If you have any questions for your care team, list them here: _________________________________________________________________________________________________________________________________________________________________________ ____________________________________ ____________________________________ ____________________________________

## 2023-01-17 NOTE — PROGRESS NOTES
[facility]  [address] [address]  Phone: [phone]  Primary Provider: Olivia Williamson  Pre-op Performing Provider: OLIVIA WILLIAMSON      PREOPERATIVE EVALUATION:  Today's date: 1/17/2023    Sushant Zepeda is a 77 year old male who presents for a preoperative evaluation.    Surgical Information:  Surgery/Procedure: Lumbar 3-Sacral 1 Posterior Lumbar Instrumented Fusion With or Without Interbody Cage and With or Without Laminectomies  Surgery Location: [facility]  Surgeon: Lam Castellanos  Surgery Date: 2/14/23  Time of Surgery: 8:00am  Where patient plans to recover: At home with family  Fax number for surgical facility: Note does not need to be faxed, will be available electronically in Epic.    Type of Anesthesia Anticipated: General    Assessment & Plan     The proposed surgical procedure is considered INTERMEDIATE risk.    Preop general physical exam  Stable exam.   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 No previous to compare. No concerning cardiac symptoms however, cannot rule out previous MI as etiology. Recommending  stress testing. Given age and limited exercisablility  due to low back pain, lexiscan recommended.     Given questionable stress testing results, was referred to cardiology for formal clearance. Was seen on 1/27/23 by Dr. Wilkinson with following:     - Reviewed findings from the ECG and nuclear stress test at length utilizing a heart model in clinic as well as referencing the actual images from his stress test and an ECG studies.     -- Per the ACC/AHA guidelines on preoperative cardiac risk assessment prior to noncardiac surgery, no further cardiac diagnostic testing is warranted prior to this planned surgery.  We discussed that given his age and comorbidities, his risk of perioperative and operative cardiac complications is not zero.  However, from a cardiac standpoint, he is as optimized as able at this time.  The patient voiced understanding and was in agreement.  Recommend continuing lisinopril, atorvastatin.                 Risks and Recommendations:  The patient has the following additional risks and recommendations for perioperative complications:   - No identified additional risk factors other than previously addressed    Medication Instructions:  Patient is to take all scheduled medications on the day of surgery EXCEPT for modifications listed below:  hold multivitamin 1 week prior to surgery   - ACE/ARB: HOLD due to exceptional risk of hypotension during surgery.    - Statins: Continue taking on the day of surgery.    - metformin: HOLD day of surgery.    RECOMMENDATION:  APPROVAL GIVEN to proceed with proposed procedure    Subjective     HPI related to upcoming procedure: history of chronic low back pain with radiculopathy. The above procedure was deemed the next best step in management.     Preop Questions 1/10/2023   1. Have you ever had a heart attack or stroke? No   2. Have you ever had surgery on your heart or blood vessels, such as a stent placement, a coronary artery bypass, or surgery on an artery in  your head, neck, heart, or legs? No   3. Do you have chest pain with activity? No   4. Do you have a history of  heart failure? No   5. Do you currently have a cold, bronchitis or symptoms of other infection? No   6. Do you have a cough, shortness of breath, or wheezing? No   7. Do you or anyone in your family have previous history of blood clots? No   8. Do you or does anyone in your family have a serious bleeding problem such as prolonged bleeding following surgeries or cuts? No   9. Have you ever had problems with anemia or been told to take iron pills? No   10. Have you had any abnormal blood loss such as black, tarry or bloody stools? No   11. Have you ever had a blood transfusion? No   12. Are you willing to have a blood transfusion if it is medically needed before, during, or after your surgery? Yes   13. Have you or any of your relatives ever had problems with anesthesia? No   14. Do you have sleep apnea, excessive snoring or daytime drowsiness? No   15. Do you have any artifical heart valves or other implanted medical devices like a pacemaker, defibrillator, or continuous glucose monitor? No   16. Do you have artificial joints? YES -    17. Are you allergic to latex? No       Health Care Directive:  Patient does not have a Health Care Directive or Living Will: Discussed advance care planning with patient; information given to patient to review.    Preoperative Review of :   reviewed - no record of controlled substances prescribed.      Status of Chronic Conditions:  See problem list for active medical problems.  Problems all longstanding and stable, except as noted/documented.  See ROS for pertinent symptoms related to these conditions.    DIABETES - Patient has a longstanding history of DiabetesType Type II . Patient is being treated with diet and oral agents and denies significant side effects. Control has been good. Complicating factors include but are not limited to: hypertension and  hyperlipidemia.     HYPERLIPIDEMIA - Patient has a long history of significant Hyperlipidemia requiring medication for treatment with recent good control. Patient reports no problems or side effects with the medication.     HYPERTENSION - Patient has longstanding history of HTN , currently denies any symptoms referable to elevated blood pressure. Specifically denies chest pain, palpitations, dyspnea, orthopnea, PND or peripheral edema. Blood pressure readings have been in normal range. Current medication regimen is as listed below. Patient denies any side effects of medication.       Review of Systems: ongoing low back and radicular pain   CONSTITUTIONAL: NEGATIVE for fever, chills, change in weight  INTEGUMENTARY/SKIN: NEGATIVE for worrisome rashes, moles or lesions  EYES: NEGATIVE for vision changes or irritation  ENT/MOUTH: NEGATIVE for ear, mouth and throat problems  RESP: NEGATIVE for significant cough or SOB  CV: NEGATIVE for chest pain, palpitations or peripheral edema  GI: NEGATIVE for nausea, abdominal pain, heartburn, or change in bowel habits  : NEGATIVE for frequency, dysuria, or hematuria  MUSCULOSKELETAL: NEGATIVE for significant arthralgias or myalgia  NEURO: NEGATIVE for weakness, dizziness or paresthesias  ENDOCRINE: NEGATIVE for temperature intolerance, skin/hair changes  HEME: NEGATIVE for bleeding problems  PSYCHIATRIC: NEGATIVE for changes in mood or affect    Patient Active Problem List    Diagnosis Date Noted     Strain of right gastrocnemius muscle 11/01/2022     Priority: Medium     Strain of gastrocnemius muscle of left lower extremity 11/01/2022     Priority: Medium     Chronic bilateral low back pain without sciatica 11/01/2022     Priority: Medium     Essential tremor 03/15/2022     Priority: Medium     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  FPA / [facility]                    Hypertension goal BP (blood pressure) < 140/90 10/21/2011     Priority: Medium     Advanced directives, counseling/discussion 10/21/2011     Priority: Medium     Advance Directive Problem List Overview:   Name Relationship Phone    Primary Health Care Agent            Alternative Health Care Agent          Discussed advance care planning with patient; information given to patient to review. 10/21/2011          BPH (benign prostatic hyperplasia) 04/19/2011     Priority: Medium     HYPERLIPIDEMIA LDL GOAL <100 10/31/2010     Priority: Medium      Past Medical History:   Diagnosis Date     Abnormal glucose      Acute right-sided low back pain with right-sided sciatica 06/27/2016     Diabetes (H)      Generalized osteoarthrosis, unspecified site      [place] (hard of hearing), bilateral      Mumps      Renal disease      Unspecified essential hypertension      Past Surgical History:   Procedure Laterality Date     COLONOSCOPY N/A 3/2/2017    Procedure: COMBINED COLONOSCOPY, SINGLE OR MULTIPLE BIOPSY/POLYPECTOMY BY BIOPSY;  Surgeon: Ru Deal MD;  Location:  GI     CYSTOSCOPY, TRANSURETHRAL RESECTION (TUR) PROSTATE, COMBINED Left 1/8/2019    Procedure: Video cystopanendoscopy, left retrograde ureterogram and pyelogram, exam under anesthesia;  Surgeon: Addy Sofia MD;  Location:  OR      REMOVE TONSILS/ADENOIDS,<13 Y/O      T & A <12y.o.     ZZC APPENDECTOMY       ZZC TOTAL HIP ARTHROPLASTY      Hip Replacement, Total     Current Outpatient Medications   Medication Sig Dispense Refill     atorvastatin (LIPITOR) 40 MG tablet Take 1 tablet (40 mg) by mouth  "daily 90 tablet 3     blood glucose (NO BRAND SPECIFIED) lancets standard Use to test blood sugar 1 times daily or as directed. 100 lancet 3     blood glucose monitoring (NO BRAND SPECIFIED) meter device kit Use to test blood sugar 1 times daily or as directed. 1 kit 0     CONTOUR NEXT TEST test strip TEST DAILY 100 strip 1     EPINEPHrine (ANY BX GENERIC EQUIV) 0.3 MG/0.3ML injection 2-pack Inject 0.3 mLs (0.3 mg) into the muscle once as needed (allergic state, sequela) 0.6 mL 3     lisinopril (ZESTRIL) 20 MG tablet Take 1 tablet (20 mg) by mouth daily 90 tablet 3     metFORMIN (GLUCOPHAGE XR) 500 MG 24 hr tablet Take 1 tablet (500 mg) by mouth daily (with dinner) 90 tablet 1     MULTI-VITAMIN OR TABS 1 tablet daily         Allergies   Allergen Reactions     Nsaids      aleve, ibuprofen     hives and swelling        Social History     Tobacco Use     Smoking status: Never     Smokeless tobacco: Never   Substance Use Topics     Alcohol use: Yes     Comment: Have maybe 1 drink  a week     Family History   Problem Relation Age of Onset     Diabetes Mother      Heart Disease Mother      Eye Disorder Mother      Cerebrovascular Disease Father      Musculoskeletal Disorder Sister      Prostate Cancer No family hx of      Cancer - colorectal No family hx of      History   Drug Use No         Objective     /67 (BP Location: Right arm, Patient Position: Chair, Cuff Size: Adult Regular)   Pulse 78   Temp 98.1  F (36.7  C) (Oral)   Resp 18   Ht 1.651 m (5' 5\")   Wt 78.9 kg (174 lb)   SpO2 98%   BMI 28.96 kg/m      Physical Exam    GENERAL APPEARANCE: healthy, alert and no distress     EYES: EOMI,  PERRL     HENT: ear canals and TM's normal and nose and mouth without ulcers or lesions     NECK: no adenopathy, no asymmetry, masses, or scars and thyroid normal to palpation     RESP: lungs clear to auscultation - no rales, rhonchi or wheezes     CV: regular rates and rhythm, normal S1 S2, no S3 or S4 and no murmur, " click or rub     MS: extremities normal- no gross deformities noted, no evidence of inflammation in joints, FROM in all extremities.     SKIN: no suspicious lesions or rashes     NEURO: Normal strength and tone, sensory exam grossly normal, mentation intact and speech normal     PSYCH: mentation appears normal. and affect normal/bright     LYMPHATICS: No cervical adenopathy    Recent Labs   Lab Test 12/12/22  1040 08/24/22  0748 03/15/22  0937 11/15/21  0905   HGB  --   --   --  13.9   NA  --  136  --  140   POTASSIUM  --  4.4  --  4.5   CR  --  1.19  --  1.09   A1C 6.9* 7.5*   < >  --     < > = values in this interval not displayed.        Diagnostics:  Labs pending at this time.  Results will be reviewed when available.  No results found for this or any previous visit (from the past 24 hour(s)).   EKG: Normal Sinus Rhythm, left anterior fasicular block    Revised Cardiac Risk Index (RCRI):  The patient has the following serious cardiovascular risks for perioperative complications:   - No serious cardiac risks = 0 points     RCRI Interpretation: 0 points: Class I (very low risk - 0.4% complication rate)           Signed Electronically by: Kentrell Urias PA-C  Copy of this evaluation report is provided to requesting physician.

## 2023-01-18 ENCOUNTER — TELEPHONE (OUTPATIENT)
Dept: FAMILY MEDICINE | Facility: CLINIC | Age: 78
End: 2023-01-18
Payer: MEDICARE

## 2023-01-18 NOTE — TELEPHONE ENCOUNTER
"Call placed to pt at [phone] to relay result note below:    \"Hi Sushant,   Your kidney function and sodium were both slightly abnormal compared to last check. This may have been due to mild dehydration at the time of testing. Your surgical team will want to verify this has normalized. In ~2 weeks, let's do a lab only appt and recheck this. Lets make sure we are fully hydrated before testing. -daphne barton, pac\"    Spoke with pt,     - Pt verbalizes understanding of results, agreeable to lab only appointment as scheduled.    - He states the labs may have been off due to him having an alcohol beverage the night before.   -He states he won't do that for the next test.   - He denies any further questions or concerns    -Informed pt to ensure he is adequately hydrated prior to next lab draw, avoid alcohol.  -Assisted pt to schedule lab only appointment on 2/3/23 at 11:00 AM.     Maria Del Rosario Carrasco RN   PAL (Patient Advocate Liason)  [facility]    "

## 2023-01-19 ENCOUNTER — TELEPHONE (OUTPATIENT)
Dept: FAMILY MEDICINE | Facility: CLINIC | Age: 78
End: 2023-01-19
Payer: MEDICARE

## 2023-01-19 NOTE — TELEPHONE ENCOUNTER
Kentrell Barton PA-C  P Cr Triage  Can we please make sure the cardiology unit reaches out to patient within the next couple of days to schedule stress testing prior to surgery on 2/14/23     Thanks     Tan barton, pac     See outreach attempt by cardiology, called pt, discussed, he will call them back to schedule stress test    User: Alma Perez Date/time: 1/18/2023 11:57 AM   Context: Leilani Schedule Orders/Appt Requests Outcome: Left Message   Phone number: [phone] Phone Type: Home Phone   Comm. type: Telephone Call type: Outgoing   Contact: Sushant Zepeda Relation to patient: Self   Letter:  Reference CRMs:        Radha Frazier, RN, BSN  [facility]

## 2023-01-24 ENCOUNTER — HOSPITAL ENCOUNTER (OUTPATIENT)
Dept: CARDIOLOGY | Facility: CLINIC | Age: 78
Discharge: HOME | End: 2023-01-24
Attending: PHYSICIAN ASSISTANT
Payer: MEDICARE

## 2023-01-24 ENCOUNTER — HOSPITAL ENCOUNTER (OUTPATIENT)
Dept: NUCLEAR MEDICINE | Facility: CLINIC | Age: 78
Setting detail: NUCLEAR MEDICINE
Discharge: HOME | End: 2023-01-24
Attending: PHYSICIAN ASSISTANT
Payer: MEDICARE

## 2023-01-24 ENCOUNTER — TELEPHONE (OUTPATIENT)
Dept: FAMILY MEDICINE | Facility: CLINIC | Age: 78
End: 2023-01-24

## 2023-01-24 DIAGNOSIS — I44.4 LEFT ANTERIOR FASCICULAR BLOCK: ICD-10-CM

## 2023-01-24 DIAGNOSIS — R94.31 ABNORMAL ELECTROCARDIOGRAM (ECG) (EKG): ICD-10-CM

## 2023-01-24 LAB
CV STRESS MAX HR HE: 98
NUC STRESS EJECTION FRACTION: 76 %
RATE PRESSURE PRODUCT: NORMAL
STRESS ECHO BASELINE DIASTOLIC HE: 71
STRESS ECHO BASELINE HR: 74 BPM
STRESS ECHO BASELINE SYSTOLIC BP: 138
STRESS ECHO CALCULATED PERCENT HR: 69 %
STRESS ECHO LAST STRESS DIASTOLIC BP: 67
STRESS ECHO LAST STRESS SYSTOLIC BP: 130
STRESS ECHO TARGET HR: 143

## 2023-01-24 PROCEDURE — 250N000011 HC RX IP 250 OP 636: Performed by: PHYSICIAN ASSISTANT

## 2023-01-24 PROCEDURE — 93016 CV STRESS TEST SUPVJ ONLY: CPT | Performed by: INTERNAL MEDICINE

## 2023-01-24 PROCEDURE — A9502 TC99M TETROFOSMIN: HCPCS | Performed by: PHYSICIAN ASSISTANT

## 2023-01-24 PROCEDURE — 78452 HT MUSCLE IMAGE SPECT MULT: CPT | Mod: 26 | Performed by: INTERNAL MEDICINE

## 2023-01-24 PROCEDURE — 93018 CV STRESS TEST I&R ONLY: CPT | Performed by: INTERNAL MEDICINE

## 2023-01-24 PROCEDURE — G1010 CDSM STANSON: HCPCS | Performed by: INTERNAL MEDICINE

## 2023-01-24 PROCEDURE — 343N000001 HC RX 343: Performed by: PHYSICIAN ASSISTANT

## 2023-01-24 PROCEDURE — 93017 CV STRESS TEST TRACING ONLY: CPT

## 2023-01-24 PROCEDURE — 78452 HT MUSCLE IMAGE SPECT MULT: CPT | Mod: ME

## 2023-01-24 RX ORDER — ALBUTEROL SULFATE 90 UG/1
2 AEROSOL, METERED RESPIRATORY (INHALATION) EVERY 5 MIN PRN
Status: DISCONTINUED | OUTPATIENT
Start: 2023-01-24 | End: 2023-01-25 | Stop reason: HOSPADM

## 2023-01-24 RX ORDER — REGADENOSON 0.08 MG/ML
0.4 INJECTION, SOLUTION INTRAVENOUS ONCE
Status: COMPLETED | OUTPATIENT
Start: 2023-01-24 | End: 2023-01-24

## 2023-01-24 RX ORDER — REGADENOSON 0.08 MG/ML
INJECTION, SOLUTION INTRAVENOUS
Status: DISPENSED
Start: 2023-01-24 | End: 2023-01-24

## 2023-01-24 RX ORDER — ACYCLOVIR 200 MG/1
0-1 CAPSULE ORAL
Status: DISCONTINUED | OUTPATIENT
Start: 2023-01-24 | End: 2023-01-25 | Stop reason: HOSPADM

## 2023-01-24 RX ORDER — AMINOPHYLLINE 25 MG/ML
50-100 INJECTION, SOLUTION INTRAVENOUS
Status: DISCONTINUED | OUTPATIENT
Start: 2023-01-24 | End: 2023-01-25 | Stop reason: HOSPADM

## 2023-01-24 RX ORDER — CAFFEINE CITRATE 20 MG/ML
60 SOLUTION INTRAVENOUS
Status: DISCONTINUED | OUTPATIENT
Start: 2023-01-24 | End: 2023-01-25 | Stop reason: HOSPADM

## 2023-01-24 RX ADMIN — REGADENOSON 0.4 MG: 0.08 INJECTION, SOLUTION INTRAVENOUS at 11:56

## 2023-01-24 RX ADMIN — TETROFOSMIN 33 MCI.: 1.38 INJECTION, POWDER, LYOPHILIZED, FOR SOLUTION INTRAVENOUS at 12:00

## 2023-01-24 RX ADMIN — TETROFOSMIN 10.7 MCI.: 1.38 INJECTION, POWDER, LYOPHILIZED, FOR SOLUTION INTRAVENOUS at 10:55

## 2023-01-24 NOTE — TELEPHONE ENCOUNTER
----- Message from Kentrell Urias PA-C sent at 1/24/2023  4:57 PM CST -----  Please call patient. No major concerns from his stress test but the cardiologist cannot fully rule out ischemia it appears. Prior to his surgery, I would recommend he consult with a cardiologist formally for preoperative clearance to be safe. Can we make sure this can be done prior to his surgery? Referral placed    -daphne urias, pac

## 2023-01-25 NOTE — TELEPHONE ENCOUNTER
Notified patient of provider message. Gave number to call to schedule with cardiology. [phone].  Sheron Tom RN

## 2023-01-27 ENCOUNTER — OFFICE VISIT (OUTPATIENT)
Dept: CARDIOLOGY | Facility: CLINIC | Age: 78
End: 2023-01-27
Attending: PHYSICIAN ASSISTANT
Payer: MEDICARE

## 2023-01-27 VITALS
OXYGEN SATURATION: 99 % | WEIGHT: 174 LBS | BODY MASS INDEX: 28.99 KG/M2 | HEIGHT: 65 IN | HEART RATE: 75 BPM | SYSTOLIC BLOOD PRESSURE: 118 MMHG | DIASTOLIC BLOOD PRESSURE: 60 MMHG

## 2023-01-27 DIAGNOSIS — I44.4 LEFT ANTERIOR FASCICULAR BLOCK: ICD-10-CM

## 2023-01-27 DIAGNOSIS — Z13.6 SCREENING FOR CARDIOVASCULAR CONDITION: Primary | ICD-10-CM

## 2023-01-27 PROCEDURE — 99204 OFFICE O/P NEW MOD 45 MIN: CPT | Performed by: INTERNAL MEDICINE

## 2023-01-27 PROCEDURE — 93000 ELECTROCARDIOGRAM COMPLETE: CPT | Performed by: INTERNAL MEDICINE

## 2023-01-27 NOTE — PATIENT INSTRUCTIONS
January 27, 2023    Thank you for allowing our Cardiology team to participate in your care.     Please note the following changes to your heart treatment plan:     Medication changes:   - none    Tests to be done:  - none    Follow up:  - Follow up as needed.      For scheduling, please call [phone].    Please contact our team at [phone] (Maria Del Rosario NVAA) or [phone] for any questions or concerns.     If you are having a medical emergency, please call [phone].     Sincerely,    Chip Wilkinson MD, FACC  Cardiology    [facility] [place]

## 2023-01-27 NOTE — LETTER
1/27/2023    Kentrell Urias PA-C  [address] [facility] [address]    RE: Sushant Zepeda       Dear Colleague,     I had the pleasure of seeing Sushant Zepeda in the [facility].      Cardiology Clinic Consultation:    January 27, 2023   Patient Name: Sushant Zepeda  Patient MRN: [id number]    Consult indication: abnormal ECG, preop cardiac risk assessment    HPI and Assessment and Plan/Recommendations:    Please see dictation. #[id number]    Thank you for allowing our team to participate in the care of Sushant Zepeda.  Please do not hesitate to call or page me with any questions or concerns.    Sincerely,     Chip Wilkinson MD, [facility][1]  Cardiology  January 27, 2023      Kentrell Urias PA-C  [address] [place] [address]    Total time spent on this encounter: 55 minutes, providing care in this encounter including, but not limited to, reviewing prior medical records, laboratory data, imaging studies, diagnostic studies, procedure notes, formulating an assessment and plan, recommendations, discussion and counseling with patient face to face, dictation.    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)     Essential tremor     Strain of right gastrocnemius muscle     Strain of gastrocnemius muscle of left lower extremity     Chronic bilateral low back pain without sciatica     Left anterior fascicular block       Past Surgical History:   Past Surgical History:   Procedure Laterality Date     COLONOSCOPY N/A  3/2/2017    Procedure: COMBINED COLONOSCOPY, SINGLE OR MULTIPLE BIOPSY/POLYPECTOMY BY BIOPSY;  Surgeon: Ru Deal MD;  Location: [facility] GI     CYSTOSCOPY, TRANSURETHRAL RESECTION (TUR) PROSTATE, COMBINED Left 1/8/2019    Procedure: Video cystopanendoscopy, left retrograde ureterogram and pyelogram, exam under anesthesia;  Surgeon: Addy Sofia MD;  Location: [facility] OR     HC REMOVE TONSILS/ADENOIDS,<13 Y/O      T & A <12y.o.     ZZC APPENDECTOMY       ZZC TOTAL HIP ARTHROPLASTY      Hip Replacement, Total       Medications (outpatient):  Current Outpatient Medications   Medication Sig Dispense Refill     atorvastatin (LIPITOR) 40 MG tablet Take 1 tablet (40 mg) by mouth daily 90 tablet 3     blood glucose (NO BRAND SPECIFIED) lancets standard Use to test blood sugar 1 times daily or as directed. 100 lancet 3     blood glucose monitoring (NO BRAND SPECIFIED) meter device kit Use to test blood sugar 1 times daily or as directed. 1 kit 0     CONTOUR NEXT TEST test strip TEST DAILY 100 strip 1     EPINEPHrine (ANY BX GENERIC EQUIV) 0.3 MG/0.3ML injection 2-pack Inject 0.3 mLs (0.3 mg) into the muscle once as needed (allergic state, sequela) 0.6 mL 3     lisinopril (ZESTRIL) 20 MG tablet Take 1 tablet (20 mg) by mouth daily 90 tablet 3     metFORMIN (GLUCOPHAGE XR) 500 MG 24 hr tablet Take 1 tablet (500 mg) by mouth daily (with dinner) 90 tablet 1     MULTI-VITAMIN OR TABS 1 tablet daily         Allergies:  Allergies   Allergen Reactions     Nsaids      aleve, ibuprofen     hives and swelling       Social History:   History   Drug Use No      History   Smoking Status     Never   Smokeless Tobacco     Never     Social History    Substance and Sexual Activity      Alcohol use: Yes        Comment: Have maybe 1 drink  a week       Family History:  Family History   Problem Relation Age of Onset     Diabetes Mother      Heart Disease Mother      Eye Disorder Mother      Cerebrovascular Disease Father       "Musculoskeletal Disorder Sister      Prostate Cancer No family hx of      Cancer - colorectal No family hx of        Review of Systems:   A comprehensive 12 system review of systems was carried out.  Pertinent positives and negatives are noted above. Otherwise negative for contributory information.    Objective & Physical Exam:  /60 (BP Location: Right arm, Patient Position: Sitting, Cuff Size: Adult Regular)   Pulse 75   Ht 1.651 m (5' 5\")   Wt 78.9 kg (174 lb)   SpO2 99%   BMI 28.96 kg/m    Wt Readings from Last 2 Encounters:   01/27/23 78.9 kg (174 lb)   01/17/23 78.9 kg (174 lb)     Body mass index is 28.96 kg/m .   Body surface area is 1.9 meters squared.    Constitutional: appears stated age, in no apparent distress, appears to be well nourished  Head: normocephalic, atraumatic  Neck: supple, trachea midline  Pulmonary: clear to auscultation bilaterally, no wheezes, no rales, no increased work of breathing  Cardiovascular: JVP normal, distant heart sounds but RRR, no lower extremity edema  Gastrointestinal: no guarding, non-rigid   Neurologic: awake, alert, moves all extremities  Skin: no jaundice, warm on limited exam  Psychiatric: affect is normal, answers questions appropriately, oriented to self and place    Data reviewed:  Lab Results   Component Value Date    WBC 4.4 01/17/2023    RBC 4.61 01/17/2023    HGB 14.3 01/17/2023    HCT 41.3 01/17/2023    MCV 90 01/17/2023    MCH 31.0 01/17/2023    MCHC 34.6 01/17/2023    RDW 12.4 01/17/2023     01/17/2023     Sodium   Date Value Ref Range Status   01/17/2023 135 (L) 136 - 145 mmol/L Final   08/26/2020 137 133 - 144 mmol/L Final     Potassium   Date Value Ref Range Status   01/17/2023 4.9 3.4 - 5.3 mmol/L Final   08/24/2022 4.4 3.4 - 5.3 mmol/L Final   08/26/2020 5.0 3.4 - 5.3 mmol/L Final     Chloride   Date Value Ref Range Status   01/17/2023 101 98 - 107 mmol/L Final   08/24/2022 105 94 - 109 mmol/L Final   08/26/2020 109 94 - 109 mmol/L " Final     Carbon Dioxide   Date Value Ref Range Status   08/26/2020 22 20 - 32 mmol/L Final     Carbon Dioxide (CO2)   Date Value Ref Range Status   01/17/2023 20 (L) 22 - 29 mmol/L Final   08/24/2022 21 20 - 32 mmol/L Final     Anion Gap   Date Value Ref Range Status   01/17/2023 14 7 - 15 mmol/L Final   08/24/2022 10 3 - 14 mmol/L Final   08/26/2020 6 3 - 14 mmol/L Final     Glucose   Date Value Ref Range Status   01/17/2023 170 (H) 70 - 99 mg/dL Final   08/24/2022 173 (H) 70 - 99 mg/dL Final   08/26/2020 112 (H) 70 - 99 mg/dL Final     Comment:     Fasting specimen     Urea Nitrogen   Date Value Ref Range Status   01/17/2023 25.4 (H) 8.0 - 23.0 mg/dL Final   08/24/2022 28 7 - 30 mg/dL Final   08/26/2020 30 7 - 30 mg/dL Final     Creatinine   Date Value Ref Range Status   01/17/2023 1.44 (H) 0.67 - 1.17 mg/dL Final   08/26/2020 1.13 0.66 - 1.25 mg/dL Final     GFR Estimate   Date Value Ref Range Status   01/17/2023 50 (L) >60 mL/min/1.73m2 Final     Comment:     Effective December 21, 2021 eGFRcr in adults is calculated using the 2021 CKD-EPI creatinine equation which includes age and gender (Estefany et al., NEJ, DOI: 10.1056/MVWCdp5477852)   08/26/2020 63 >60 mL/min/[1.73_m2] Final     Comment:     Non  GFR Calc  Starting 12/18/2018, serum creatinine based estimated GFR (eGFR) will be   calculated using the Chronic Kidney Disease Epidemiology Collaboration   (CKD-EPI) equation.       Calcium   Date Value Ref Range Status   01/17/2023 9.6 8.8 - 10.2 mg/dL Final   08/26/2020 9.5 8.5 - 10.1 mg/dL Final     Bilirubin Total   Date Value Ref Range Status   08/26/2020 0.4 0.2 - 1.3 mg/dL Final     Alkaline Phosphatase   Date Value Ref Range Status   08/26/2020 121 40 - 150 U/L Final     ALT   Date Value Ref Range Status   12/12/2022 25 10 - 50 U/L Final   08/26/2020 27 0 - 70 U/L Final     AST   Date Value Ref Range Status   08/26/2020 17 0 - 45 U/L Final     Recent Labs   Lab Test 12/12/22  8150  "11/15/21  0905 02/19/16  0948 08/21/15  0959 02/11/15  0905   CHOL 131 123   < > 140 139   HDL 37* 51   < > 53 59   LDL 53 57   < > 69 61   TRIG 207* 73   < > 91 96   CHOLHDLRATIO  --   --   --  2.6 2.4    < > = values in this interval not displayed.      Lab Results   Component Value Date    A1C 6.9 01/17/2023    A1C 6.9 12/12/2022    A1C 7.5 08/24/2022    A1C 6.9 03/15/2022    A1C 6.8 09/14/2021    A1C 6.8 02/22/2021    A1C 6.7 08/26/2020    A1C 6.2 11/01/2019    A1C 6.3 04/04/2019    A1C 6.7 01/03/2019          Service Date: 01/27/2023    CONSULT INDICATION:  Abnormal ECG, preoperative cardiac risk assessment.    HISTORY OF PRESENT ILLNESS:      I had the opportunity to see patient, Sushant Zepeda, today in Cardiology Clinic for a consultation.  As you know, patient is a pleasant 77-year-old male with a past medical history significant for hypertension, dyslipidemia, diabetes, chronic kidney disease, who presents for further evaluation and management of abnormal ECG and for preoperative cardiac risk assessment.    The patient is planned for a lumbar spine surgery.  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  These vehicles are not equipped with a suspension designed for a prolonged driving and so he developed significant pain in his back following this task.  He is planned for lumbosacral fusion.    The patient was recently seen by our colleague, VENUS Cortez, for preoperative evaluation.  The patient was assessed with an ECG 01/17/2023 that demonstrates normal sinus rhythm at 75 beats per minute, left anterior fascicular block.  Nuclear stress test was done on 01/24/2003 that was read as \"probably negative for inducible myocardial ischemia or infarction.\" LV function was hyperdynamic.      On personal review, the study demonstrates " decreased radiotracer uptake in the inferior wall segments, both rest and stress imaging with a significant diaphragmatic shadowing.  Per the nuclear stress report, wall motion was preserved in this area and so this was felt to demonstrate diaphragmatic attenuation artifact.    The patient is accompanied today by his wife.  He reports that he is quite physically active at baseline.  He climbs 2 flights of stairs several times a day and sometimes even climbs 3 flights of stairs about twice a week.  He denies any exertional chest pain, chest pressure, abnormal shortness of breath.  He denies symptoms of orthopnea/PND or abnormal lower extremity swelling.  He denies dizziness/lightheadedness, syncope.     Blood pressure today in clinic is 118/60 mmHg.    Last cholesterol labs from 12/12/2022 were notable for total cholesterol 131, HDL 37, LDL 53, triglycerides 207.    ASSESSMENT, PLAN AND RECOMMENDATIONS:      # Preoperative cardiac risk assessment prior to a spine surgery.  Overall, the patient is in stable cardiovascular health without symptoms concerning for angina or decompensated heart failure.  RCRI score 1.  He is able to engage in physical activity equivalent to at least 4 METs without abnormal symptoms.  ECG demonstrates normal sinus rhythm with left anterior fascicular block, no acute ischemic changes.  Lexiscan stress test 01/24/2023 was consistent with low likelihood of obstructive coronary artery disease.      -- Reviewed findings from the ECG and nuclear stress test at length utilizing a heart model in clinic as well as referencing the actual images from his stress test and an ECG studies.    -- Per the ACC/AHA guidelines on preoperative cardiac risk assessment prior to noncardiac surgery, no further cardiac diagnostic testing is warranted prior to this planned surgery.  We discussed that given his age and comorbidities, his risk of perioperative and operative cardiac complications is not zero.  However,  from a cardiac standpoint, he is as optimized as able at this time.  The patient voiced understanding and was in agreement.  Recommend continuing lisinopril, atorvastatin.    -- Scheduled follow up in Cardiology Clinic is not required at this time.  However, we will be glad to see him back as needed in the future.    Thank you for allowing our team to participate in the care of patient, King Ng.    Please do not hesitate to page or call with any questions or concerns.    Chip Wilkinson MD, [facility]  Cardiology      D: 2023   T: 2023   MT:     Name:     KING NG  MRN:      [id number]        Account:      [id number]   :      1945           Service Date: 2023       Document: [id number]      Thank you for allowing me to participate in the care of your patient.      Sincerely,     Chip Wilkinson MD     [facility]  cc:   Kentrell Urias PA-C  [address] [place] [address]

## 2023-01-27 NOTE — PROGRESS NOTES
Cardiology Clinic Consultation:    January 27, 2023   Patient Name: Sushant Zepeda  Patient MRN: [id number]    Consult indication: abnormal ECG, preop cardiac risk assessment    HPI and Assessment and Plan/Recommendations:    Please see dictation. #[id number]    Thank you for allowing our team to participate in the care of Sushant Zepeda.  Please do not hesitate to call or page me with any questions or concerns.    Sincerely,     Chip Wilkinson MD, [facility]  Cardiology  January 27, 2023      Kentrell Urias PA-C  [address] [place] [address]    Total time spent on this encounter: 55 minutes, providing care in this encounter including, but not limited to, reviewing prior medical records, laboratory data, imaging studies, diagnostic studies, procedure notes, formulating an assessment and plan, recommendations, discussion and counseling with patient face to face, dictation.    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)     Essential tremor     Strain of right gastrocnemius muscle     Strain of gastrocnemius muscle of left lower extremity     Chronic bilateral low back pain without sciatica     Left anterior fascicular block       Past Surgical History:   Past Surgical History:   Procedure Laterality Date     COLONOSCOPY N/A 3/2/2017    Procedure: COMBINED COLONOSCOPY, SINGLE OR MULTIPLE BIOPSY/POLYPECTOMY BY BIOPSY;  Surgeon: Ru Deal MD;  Location:  GI     CYSTOSCOPY, TRANSURETHRAL RESECTION (TUR) PROSTATE, COMBINED  Left 1/8/2019    Procedure: Video cystopanendoscopy, left retrograde ureterogram and pyelogram, exam under anesthesia;  Surgeon: Addy Sofia MD;  Location: [facility] OR     HC REMOVE TONSILS/ADENOIDS,<11 Y/O      T & A <12y.o.     ZZC APPENDECTOMY       ZZC TOTAL HIP ARTHROPLASTY      Hip Replacement, Total       Medications (outpatient):  Current Outpatient Medications   Medication Sig Dispense Refill     atorvastatin (LIPITOR) 40 MG tablet Take 1 tablet (40 mg) by mouth daily 90 tablet 3     blood glucose (NO BRAND SPECIFIED) lancets standard Use to test blood sugar 1 times daily or as directed. 100 lancet 3     blood glucose monitoring (NO BRAND SPECIFIED) meter device kit Use to test blood sugar 1 times daily or as directed. 1 kit 0     CONTOUR NEXT TEST test strip TEST DAILY 100 strip 1     EPINEPHrine (ANY BX GENERIC EQUIV) 0.3 MG/0.3ML injection 2-pack Inject 0.3 mLs (0.3 mg) into the muscle once as needed (allergic state, sequela) 0.6 mL 3     lisinopril (ZESTRIL) 20 MG tablet Take 1 tablet (20 mg) by mouth daily 90 tablet 3     metFORMIN (GLUCOPHAGE XR) 500 MG 24 hr tablet Take 1 tablet (500 mg) by mouth daily (with dinner) 90 tablet 1     MULTI-VITAMIN OR TABS 1 tablet daily         Allergies:  Allergies   Allergen Reactions     Nsaids      aleve, ibuprofen     hives and swelling       Social History:   History   Drug Use No      History   Smoking Status     Never   Smokeless Tobacco     Never     Social History    Substance and Sexual Activity      Alcohol use: Yes        Comment: Have maybe 1 drink  a week       Family History:  Family History   Problem Relation Age of Onset     Diabetes Mother      Heart Disease Mother      Eye Disorder Mother      Cerebrovascular Disease Father      Musculoskeletal Disorder Sister      Prostate Cancer No family hx of      Cancer - colorectal No family hx of        Review of Systems:   A comprehensive 12 system review of systems was carried out.  Pertinent positives and  "negatives are noted above. Otherwise negative for contributory information.    Objective & Physical Exam:  /60 (BP Location: Right arm, Patient Position: Sitting, Cuff Size: Adult Regular)   Pulse 75   Ht 1.651 m (5' 5\")   Wt 78.9 kg (174 lb)   SpO2 99%   BMI 28.96 kg/m    Wt Readings from Last 2 Encounters:   01/27/23 78.9 kg (174 lb)   01/17/23 78.9 kg (174 lb)     Body mass index is 28.96 kg/m .   Body surface area is 1.9 meters squared.    Constitutional: appears stated age, in no apparent distress, appears to be well nourished  Head: normocephalic, atraumatic  Neck: supple, trachea midline  Pulmonary: clear to auscultation bilaterally, no wheezes, no rales, no increased work of breathing  Cardiovascular: JVP normal, distant heart sounds but RRR, no lower extremity edema  Gastrointestinal: no guarding, non-rigid   Neurologic: awake, alert, moves all extremities  Skin: no jaundice, warm on limited exam  Psychiatric: affect is normal, answers questions appropriately, oriented to self and place    Data reviewed:  Lab Results   Component Value Date    WBC 4.4 01/17/2023    RBC 4.61 01/17/2023    HGB 14.3 01/17/2023    HCT 41.3 01/17/2023    MCV 90 01/17/2023    MCH 31.0 01/17/2023    MCHC 34.6 01/17/2023    RDW 12.4 01/17/2023     01/17/2023     Sodium   Date Value Ref Range Status   01/17/2023 135 (L) 136 - 145 mmol/L Final   08/26/2020 137 133 - 144 mmol/L Final     Potassium   Date Value Ref Range Status   01/17/2023 4.9 3.4 - 5.3 mmol/L Final   08/24/2022 4.4 3.4 - 5.3 mmol/L Final   08/26/2020 5.0 3.4 - 5.3 mmol/L Final     Chloride   Date Value Ref Range Status   01/17/2023 101 98 - 107 mmol/L Final   08/24/2022 105 94 - 109 mmol/L Final   08/26/2020 109 94 - 109 mmol/L Final     Carbon Dioxide   Date Value Ref Range Status   08/26/2020 22 20 - 32 mmol/L Final     Carbon Dioxide (CO2)   Date Value Ref Range Status   01/17/2023 20 (L) 22 - 29 mmol/L Final   08/24/2022 21 20 - 32 mmol/L Final "     Anion Gap   Date Value Ref Range Status   01/17/2023 14 7 - 15 mmol/L Final   08/24/2022 10 3 - 14 mmol/L Final   08/26/2020 6 3 - 14 mmol/L Final     Glucose   Date Value Ref Range Status   01/17/2023 170 (H) 70 - 99 mg/dL Final   08/24/2022 173 (H) 70 - 99 mg/dL Final   08/26/2020 112 (H) 70 - 99 mg/dL Final     Comment:     Fasting specimen     Urea Nitrogen   Date Value Ref Range Status   01/17/2023 25.4 (H) 8.0 - 23.0 mg/dL Final   08/24/2022 28 7 - 30 mg/dL Final   08/26/2020 30 7 - 30 mg/dL Final     Creatinine   Date Value Ref Range Status   01/17/2023 1.44 (H) 0.67 - 1.17 mg/dL Final   08/26/2020 1.13 0.66 - 1.25 mg/dL Final     GFR Estimate   Date Value Ref Range Status   01/17/2023 50 (L) >60 mL/min/1.73m2 Final     Comment:     Effective December 21, 2021 eGFRcr in adults is calculated using the 2021 CKD-EPI creatinine equation which includes age and gender (Estefany et al., NEJM, DOI: 10.1056/ZQKJlg6611050)   08/26/2020 63 >60 mL/min/[1.73_m2] Final     Comment:     Non  GFR Calc  Starting 12/18/2018, serum creatinine based estimated GFR (eGFR) will be   calculated using the Chronic Kidney Disease Epidemiology Collaboration   (CKD-EPI) equation.       Calcium   Date Value Ref Range Status   01/17/2023 9.6 8.8 - 10.2 mg/dL Final   08/26/2020 9.5 8.5 - 10.1 mg/dL Final     Bilirubin Total   Date Value Ref Range Status   08/26/2020 0.4 0.2 - 1.3 mg/dL Final     Alkaline Phosphatase   Date Value Ref Range Status   08/26/2020 121 40 - 150 U/L Final     ALT   Date Value Ref Range Status   12/12/2022 25 10 - 50 U/L Final   08/26/2020 27 0 - 70 U/L Final     AST   Date Value Ref Range Status   08/26/2020 17 0 - 45 U/L Final     Recent Labs   Lab Test 12/12/22  1358 11/15/21  0905 02/19/16  0948 08/21/15  0959 02/11/15  0905   CHOL 131 123   < > 140 139   HDL 37* 51   < > 53 59   LDL 53 57   < > 69 61   TRIG 207* 73   < > 91 96   CHOLHDLRATIO  --   --   --  2.6 2.4    < > = values in this  interval not displayed.      Lab Results   Component Value Date    A1C 6.9 01/17/2023    A1C 6.9 12/12/2022    A1C 7.5 08/24/2022    A1C 6.9 03/15/2022    A1C 6.8 09/14/2021    A1C 6.8 02/22/2021    A1C 6.7 08/26/2020    A1C 6.2 11/01/2019    A1C 6.3 04/04/2019    A1C 6.7 01/03/2019

## 2023-01-27 NOTE — PROGRESS NOTES
"Service Date: 01/27/2023    CONSULT INDICATION:  Abnormal ECG, preoperative cardiac risk assessment.    HISTORY OF PRESENT ILLNESS:      I had the opportunity to see patient, Sushant Zepeda, today in Cardiology Clinic for a consultation.  As you know, patient is a pleasant 77-year-old male with a past medical history significant for hypertension, dyslipidemia, diabetes, chronic kidney disease, who presents for further evaluation and management of abnormal ECG and for preoperative cardiac risk assessment.    The patient is planned for a lumbar spine surgery.  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  These vehicles are not equipped with a suspension designed for a prolonged driving and so he developed significant pain in his back following this task.  He is planned for lumbosacral fusion.    The patient was recently seen by our colleague, VENUS Cortez, for preoperative evaluation.  The patient was assessed with an ECG 01/17/2023 that demonstrates normal sinus rhythm at 75 beats per minute, left anterior fascicular block.  Nuclear stress test was done on 01/24/2003 that was read as \"probably negative for inducible myocardial ischemia or infarction.\" LV function was hyperdynamic.      On personal review, the study demonstrates decreased radiotracer uptake in the inferior wall segments, both rest and stress imaging with a significant diaphragmatic shadowing.  Per the nuclear stress report, wall motion was preserved in this area and so this was felt to demonstrate diaphragmatic attenuation artifact.    The patient is accompanied today by his wife.  He reports that he is quite physically active at baseline.  He climbs 2 flights of stairs several times a day and sometimes even climbs 3 flights of stairs about twice a week.  He denies any exertional " chest pain, chest pressure, abnormal shortness of breath.  He denies symptoms of orthopnea/PND or abnormal lower extremity swelling.  He denies dizziness/lightheadedness, syncope.     Blood pressure today in clinic is 118/60 mmHg.    Last cholesterol labs from 12/12/2022 were notable for total cholesterol 131, HDL 37, LDL 53, triglycerides 207.    ASSESSMENT, PLAN AND RECOMMENDATIONS:      # Preoperative cardiac risk assessment prior to a spine surgery.  Overall, the patient is in stable cardiovascular health without symptoms concerning for angina or decompensated heart failure.  RCRI score 1.  He is able to engage in physical activity equivalent to at least 4 METs without abnormal symptoms.  ECG demonstrates normal sinus rhythm with left anterior fascicular block, no acute ischemic changes.  Lexiscan stress test 01/24/2023 was consistent with low likelihood of obstructive coronary artery disease.      -- Reviewed findings from the ECG and nuclear stress test at length utilizing a heart model in clinic as well as referencing the actual images from his stress test and an ECG studies.    -- Per the ACC/AHA guidelines on preoperative cardiac risk assessment prior to noncardiac surgery, no further cardiac diagnostic testing is warranted prior to this planned surgery.  We discussed that given his age and comorbidities, his risk of perioperative and operative cardiac complications is not zero.  However, from a cardiac standpoint, he is as optimized as able at this time.  The patient voiced understanding and was in agreement.  Recommend continuing lisinopril, atorvastatin.    -- Scheduled follow up in Cardiology Clinic is not required at this time.  However, we will be glad to see him back as needed in the future.    Thank you for allowing our team to participate in the care of patient, Sushant Zepeda.    Please do not hesitate to page or call with any questions or concerns.    Chip Wilkinson MD, [facility]   Cardiology      D: 2023   T: 2023   MT: CAMILA    Name:     KING NG  MRN:      [id number]        Account:      [id number]   :      1945           Service Date: 2023       Document: [id number]

## 2023-02-03 ENCOUNTER — LAB (OUTPATIENT)
Dept: LAB | Facility: CLINIC | Age: 78
End: 2023-02-03
Payer: MEDICARE

## 2023-02-03 DIAGNOSIS — R79.89 ELEVATED SERUM CREATININE: ICD-10-CM

## 2023-02-03 DIAGNOSIS — E87.1 HYPONATREMIA: ICD-10-CM

## 2023-02-03 LAB
CREAT SERPL-MCNC: 1.16 MG/DL (ref 0.67–1.17)
GFR SERPL CREATININE-BSD FRML MDRD: 65 ML/MIN/1.73M2
SODIUM SERPL-SCNC: 137 MMOL/L (ref 136–145)

## 2023-02-03 PROCEDURE — 82565 ASSAY OF CREATININE: CPT

## 2023-02-03 PROCEDURE — 84295 ASSAY OF SERUM SODIUM: CPT

## 2023-02-03 PROCEDURE — 36415 COLL VENOUS BLD VENIPUNCTURE: CPT

## 2023-02-13 NOTE — PHARMACY-ADMISSION MEDICATION HISTORY
Medication history and patient interview completed by pharmacy intern/student or pre-admitting RN.  Reviewed by pharmacist, including [employer] dispense records, [facility] Care Everywhere, and chart review.       Adonay West, Pharm.D., BCPS      Status Changed by Time of change   Nurse China Gunn, RN Wed Jan 18, 2023  3:42 PM     Prior to Admission medications    Medication Sig Last Dose Taking? Auth Provider Long Term End Date   atorvastatin (LIPITOR) 40 MG tablet Take 1 tablet (40 mg) by mouth daily  Yes Kentrell Urias PA-C Yes    lisinopril (ZESTRIL) 20 MG tablet Take 1 tablet (20 mg) by mouth daily  Yes Kentrell Urias PA-C Yes    metFORMIN (GLUCOPHAGE XR) 500 MG 24 hr tablet Take 1 tablet (500 mg) by mouth daily (with dinner)  Yes Kentrell Urias PA-C Yes    MULTI-VITAMIN OR TABS 1 tablet daily  Yes Yfn Baum MD     blood glucose (NO BRAND SPECIFIED) lancets standard Use to test blood sugar 1 times daily or as directed.   Kentrell Urias PA-C     blood glucose monitoring (NO BRAND SPECIFIED) meter device kit Use to test blood sugar 1 times daily or as directed.   Kentrell Urias PA-C No    CONTOUR NEXT TEST test strip TEST DAILY   Kentrell Urias PA-C Yes    EPINEPHrine (ANY BX GENERIC EQUIV) 0.3 MG/0.3ML injection 2-pack Inject 0.3 mLs (0.3 mg) into the muscle once as needed (allergic state, sequela)   Kentrell Urias PA-C

## 2023-02-14 ENCOUNTER — ANESTHESIA (OUTPATIENT)
Dept: SURGERY | Facility: CLINIC | Age: 78
DRG: 454 | End: 2023-02-14
Payer: MEDICARE

## 2023-02-14 ENCOUNTER — APPOINTMENT (OUTPATIENT)
Dept: GENERAL RADIOLOGY | Facility: CLINIC | Age: 78
DRG: 454 | End: 2023-02-14
Attending: ORTHOPAEDIC SURGERY
Payer: MEDICARE

## 2023-02-14 ENCOUNTER — HOSPITAL ENCOUNTER (INPATIENT)
Facility: CLINIC | Age: 78
LOS: 2 days | Discharge: HOME | DRG: 454 | End: 2023-02-16
Attending: ORTHOPAEDIC SURGERY | Admitting: ORTHOPAEDIC SURGERY
Payer: MEDICARE

## 2023-02-14 ENCOUNTER — ANESTHESIA EVENT (OUTPATIENT)
Dept: SURGERY | Facility: CLINIC | Age: 78
DRG: 454 | End: 2023-02-14
Payer: MEDICARE

## 2023-02-14 DIAGNOSIS — Z98.1 S/P LUMBAR FUSION: Primary | ICD-10-CM

## 2023-02-14 LAB
GLUCOSE BLDC GLUCOMTR-MCNC: 126 MG/DL (ref 70–99)
GLUCOSE BLDC GLUCOMTR-MCNC: 161 MG/DL (ref 70–99)
GLUCOSE BLDC GLUCOMTR-MCNC: 162 MG/DL (ref 70–99)
GLUCOSE BLDC GLUCOMTR-MCNC: 205 MG/DL (ref 70–99)

## 2023-02-14 PROCEDURE — 0SG3071 FUSION OF LUMBOSACRAL JOINT WITH AUTOLOGOUS TISSUE SUBSTITUTE, POSTERIOR APPROACH, POSTERIOR COLUMN, OPEN APPROACH: ICD-10-PCS | Performed by: ORTHOPAEDIC SURGERY

## 2023-02-14 PROCEDURE — 250N000013 HC RX MED GY IP 250 OP 250 PS 637

## 2023-02-14 PROCEDURE — 250N000011 HC RX IP 250 OP 636: Performed by: PHYSICIAN ASSISTANT

## 2023-02-14 PROCEDURE — C1762 CONN TISS, HUMAN(INC FASCIA): HCPCS | Performed by: ORTHOPAEDIC SURGERY

## 2023-02-14 PROCEDURE — 250N000009 HC RX 250: Performed by: NURSE ANESTHETIST, CERTIFIED REGISTERED

## 2023-02-14 PROCEDURE — 8E0WXBF COMPUTER ASSISTED PROCEDURE OF TRUNK REGION, WITH FLUOROSCOPY: ICD-10-PCS | Performed by: ORTHOPAEDIC SURGERY

## 2023-02-14 PROCEDURE — 250N000011 HC RX IP 250 OP 636: Performed by: ORTHOPAEDIC SURGERY

## 2023-02-14 PROCEDURE — 999N000179 XR SURGERY CARM FLUORO LESS THAN 5 MIN W STILLS: Mod: TC

## 2023-02-14 PROCEDURE — 250N000011 HC RX IP 250 OP 636: Performed by: ANESTHESIOLOGY

## 2023-02-14 PROCEDURE — 710N000009 HC RECOVERY PHASE 1, LEVEL 1, PER MIN: Performed by: ORTHOPAEDIC SURGERY

## 2023-02-14 PROCEDURE — 120N000001 HC R&B MED SURG/OB

## 2023-02-14 PROCEDURE — 0SG1071 FUSION OF 2 OR MORE LUMBAR VERTEBRAL JOINTS WITH AUTOLOGOUS TISSUE SUBSTITUTE, POSTERIOR APPROACH, POSTERIOR COLUMN, OPEN APPROACH: ICD-10-PCS | Performed by: ORTHOPAEDIC SURGERY

## 2023-02-14 PROCEDURE — 250N000025 HC SEVOFLURANE, PER MIN: Performed by: ORTHOPAEDIC SURGERY

## 2023-02-14 PROCEDURE — 250N000013 HC RX MED GY IP 250 OP 250 PS 637: Performed by: ANESTHESIOLOGY

## 2023-02-14 PROCEDURE — 250N000013 HC RX MED GY IP 250 OP 250 PS 637: Performed by: PHYSICIAN ASSISTANT

## 2023-02-14 PROCEDURE — 999N000141 HC STATISTIC PRE-PROCEDURE NURSING ASSESSMENT: Performed by: ORTHOPAEDIC SURGERY

## 2023-02-14 PROCEDURE — 250N000011 HC RX IP 250 OP 636: Performed by: NURSE ANESTHETIST, CERTIFIED REGISTERED

## 2023-02-14 PROCEDURE — 0SG10AJ FUSION OF 2 OR MORE LUMBAR VERTEBRAL JOINTS WITH INTERBODY FUSION DEVICE, POSTERIOR APPROACH, ANTERIOR COLUMN, OPEN APPROACH: ICD-10-PCS | Performed by: ORTHOPAEDIC SURGERY

## 2023-02-14 PROCEDURE — 250N000009 HC RX 250: Performed by: PHYSICIAN ASSISTANT

## 2023-02-14 PROCEDURE — 0SB20ZZ EXCISION OF LUMBAR VERTEBRAL DISC, OPEN APPROACH: ICD-10-PCS | Performed by: ORTHOPAEDIC SURGERY

## 2023-02-14 PROCEDURE — 01NR0ZZ RELEASE SACRAL NERVE, OPEN APPROACH: ICD-10-PCS | Performed by: ORTHOPAEDIC SURGERY

## 2023-02-14 PROCEDURE — 0SG30AJ FUSION OF LUMBOSACRAL JOINT WITH INTERBODY FUSION DEVICE, POSTERIOR APPROACH, ANTERIOR COLUMN, OPEN APPROACH: ICD-10-PCS | Performed by: ORTHOPAEDIC SURGERY

## 2023-02-14 PROCEDURE — 278N000051 HC OR IMPLANT GENERAL: Performed by: ORTHOPAEDIC SURGERY

## 2023-02-14 PROCEDURE — C1713 ANCHOR/SCREW BN/BN,TIS/BN: HCPCS | Performed by: ORTHOPAEDIC SURGERY

## 2023-02-14 PROCEDURE — 250N000009 HC RX 250: Performed by: ORTHOPAEDIC SURGERY

## 2023-02-14 PROCEDURE — 360N000085 HC SURGERY LEVEL 5 W/ FLUORO, PER MIN: Performed by: ORTHOPAEDIC SURGERY

## 2023-02-14 PROCEDURE — 258N000003 HC RX IP 258 OP 636: Performed by: ORTHOPAEDIC SURGERY

## 2023-02-14 PROCEDURE — 00NY0ZZ RELEASE LUMBAR SPINAL CORD, OPEN APPROACH: ICD-10-PCS | Performed by: ORTHOPAEDIC SURGERY

## 2023-02-14 PROCEDURE — 0SB40ZZ EXCISION OF LUMBOSACRAL DISC, OPEN APPROACH: ICD-10-PCS | Performed by: ORTHOPAEDIC SURGERY

## 2023-02-14 PROCEDURE — 370N000017 HC ANESTHESIA TECHNICAL FEE, PER MIN: Performed by: ORTHOPAEDIC SURGERY

## 2023-02-14 PROCEDURE — 258N000003 HC RX IP 258 OP 636: Performed by: PHYSICIAN ASSISTANT

## 2023-02-14 PROCEDURE — 0QS004Z REPOSITION LUMBAR VERTEBRA WITH INTERNAL FIXATION DEVICE, OPEN APPROACH: ICD-10-PCS | Performed by: ORTHOPAEDIC SURGERY

## 2023-02-14 PROCEDURE — 272N000001 HC OR GENERAL SUPPLY STERILE: Performed by: ORTHOPAEDIC SURGERY

## 2023-02-14 PROCEDURE — 0QS104Z REPOSITION SACRUM WITH INTERNAL FIXATION DEVICE, OPEN APPROACH: ICD-10-PCS | Performed by: ORTHOPAEDIC SURGERY

## 2023-02-14 PROCEDURE — 258N000003 HC RX IP 258 OP 636: Performed by: NURSE ANESTHETIST, CERTIFIED REGISTERED

## 2023-02-14 PROCEDURE — 01NB0ZZ RELEASE LUMBAR NERVE, OPEN APPROACH: ICD-10-PCS | Performed by: ORTHOPAEDIC SURGERY

## 2023-02-14 DEVICE — IMP SPI INTERBODY MEDT CATALYFT PL LONG 9MM 6068096: Type: IMPLANTABLE DEVICE | Site: SPINE LUMBAR | Status: FUNCTIONAL

## 2023-02-14 DEVICE — GRAFT BONE MAGNIFUSE 1CMX10CM 7509211: Type: IMPLANTABLE DEVICE | Site: SPINE LUMBAR | Status: FUNCTIONAL

## 2023-02-14 DEVICE — GRAFT BONE FIBERS DBF 6ML INJ T50306 PRELOADED: Type: IMPLANTABLE DEVICE | Site: SPINE LUMBAR | Status: FUNCTIONAL

## 2023-02-14 DEVICE — IMPLANTABLE DEVICE: Type: IMPLANTABLE DEVICE | Site: SPINE LUMBAR | Status: FUNCTIONAL

## 2023-02-14 DEVICE — IMP SCR MEDT 5.5/6.0MM SOLERA 5.5X45MM MA 55840005545: Type: IMPLANTABLE DEVICE | Site: SPINE LUMBAR | Status: FUNCTIONAL

## 2023-02-14 DEVICE — IMP SCR MEDT 5.5/6.0MM SOLERA 6.5X45MM MA 55840006545: Type: IMPLANTABLE DEVICE | Site: SPINE LUMBAR | Status: FUNCTIONAL

## 2023-02-14 DEVICE — IMP SPI INTERBODY MEDT CATALYFT PL LONG 7MM 6068076: Type: IMPLANTABLE DEVICE | Site: SPINE LUMBAR | Status: FUNCTIONAL

## 2023-02-14 DEVICE — IMP SCR SET MEDT SOLERA BREAK OFF 5.5MM TI 5540030: Type: IMPLANTABLE DEVICE | Site: SPINE LUMBAR | Status: FUNCTIONAL

## 2023-02-14 DEVICE — IMP ROD MEDT SOLERA CVD 5.5X80MM TI 1553201080: Type: IMPLANTABLE DEVICE | Site: SPINE LUMBAR | Status: FUNCTIONAL

## 2023-02-14 DEVICE — IMP SCR MEDT 5.5/6.0MM SOLERA 6.5X40MM MA 55840006540: Type: IMPLANTABLE DEVICE | Site: SPINE LUMBAR | Status: FUNCTIONAL

## 2023-02-14 RX ORDER — SODIUM CHLORIDE, SODIUM LACTATE, POTASSIUM CHLORIDE, CALCIUM CHLORIDE 600; 310; 30; 20 MG/100ML; MG/100ML; MG/100ML; MG/100ML
INJECTION, SOLUTION INTRAVENOUS CONTINUOUS PRN
Status: DISCONTINUED | OUTPATIENT
Start: 2023-02-14 | End: 2023-02-14

## 2023-02-14 RX ORDER — HYDROMORPHONE HCL IN WATER/PF 6 MG/30 ML
0.2 PATIENT CONTROLLED ANALGESIA SYRINGE INTRAVENOUS EVERY 5 MIN PRN
Status: DISCONTINUED | OUTPATIENT
Start: 2023-02-14 | End: 2023-02-14 | Stop reason: HOSPADM

## 2023-02-14 RX ORDER — GABAPENTIN 100 MG/1
100 CAPSULE ORAL
Status: COMPLETED | OUTPATIENT
Start: 2023-02-14 | End: 2023-02-14

## 2023-02-14 RX ORDER — ONDANSETRON 2 MG/ML
4 INJECTION INTRAMUSCULAR; INTRAVENOUS EVERY 6 HOURS PRN
Status: DISCONTINUED | OUTPATIENT
Start: 2023-02-14 | End: 2023-02-16 | Stop reason: HOSPADM

## 2023-02-14 RX ORDER — OXYCODONE HYDROCHLORIDE 5 MG/1
10 TABLET ORAL EVERY 4 HOURS PRN
Status: DISCONTINUED | OUTPATIENT
Start: 2023-02-14 | End: 2023-02-14 | Stop reason: HOSPADM

## 2023-02-14 RX ORDER — PROPOFOL 10 MG/ML
INJECTION, EMULSION INTRAVENOUS CONTINUOUS PRN
Status: DISCONTINUED | OUTPATIENT
Start: 2023-02-14 | End: 2023-02-14

## 2023-02-14 RX ORDER — HYDROXYZINE HYDROCHLORIDE 10 MG/1
10 TABLET, FILM COATED ORAL EVERY 6 HOURS PRN
Status: DISCONTINUED | OUTPATIENT
Start: 2023-02-14 | End: 2023-02-16 | Stop reason: HOSPADM

## 2023-02-14 RX ORDER — VITAMIN B COMPLEX
25 TABLET ORAL 2 TIMES DAILY
Status: DISCONTINUED | OUTPATIENT
Start: 2023-02-14 | End: 2023-02-16 | Stop reason: HOSPADM

## 2023-02-14 RX ORDER — ONDANSETRON 4 MG/1
4 TABLET, ORALLY DISINTEGRATING ORAL EVERY 30 MIN PRN
Status: DISCONTINUED | OUTPATIENT
Start: 2023-02-14 | End: 2023-02-14 | Stop reason: HOSPADM

## 2023-02-14 RX ORDER — NICOTINE POLACRILEX 4 MG
15-30 LOZENGE BUCCAL
Status: DISCONTINUED | OUTPATIENT
Start: 2023-02-14 | End: 2023-02-16 | Stop reason: HOSPADM

## 2023-02-14 RX ORDER — CEFAZOLIN SODIUM/WATER 2 G/20 ML
2 SYRINGE (ML) INTRAVENOUS SEE ADMIN INSTRUCTIONS
Status: DISCONTINUED | OUTPATIENT
Start: 2023-02-14 | End: 2023-02-14 | Stop reason: HOSPADM

## 2023-02-14 RX ORDER — POLYETHYLENE GLYCOL 3350 17 G/17G
17 POWDER, FOR SOLUTION ORAL DAILY
Status: DISCONTINUED | OUTPATIENT
Start: 2023-02-15 | End: 2023-02-16 | Stop reason: HOSPADM

## 2023-02-14 RX ORDER — CEFAZOLIN SODIUM 1 G/3ML
1 INJECTION, POWDER, FOR SOLUTION INTRAMUSCULAR; INTRAVENOUS EVERY 8 HOURS
Status: COMPLETED | OUTPATIENT
Start: 2023-02-14 | End: 2023-02-15

## 2023-02-14 RX ORDER — CEFAZOLIN SODIUM/WATER 2 G/20 ML
2 SYRINGE (ML) INTRAVENOUS
Status: COMPLETED | OUTPATIENT
Start: 2023-02-14 | End: 2023-02-14

## 2023-02-14 RX ORDER — HYDRALAZINE HYDROCHLORIDE 20 MG/ML
2.5-5 INJECTION INTRAMUSCULAR; INTRAVENOUS EVERY 10 MIN PRN
Status: DISCONTINUED | OUTPATIENT
Start: 2023-02-14 | End: 2023-02-14 | Stop reason: HOSPADM

## 2023-02-14 RX ORDER — ACETAMINOPHEN 325 MG/1
975 TABLET ORAL EVERY 8 HOURS
Status: DISCONTINUED | OUTPATIENT
Start: 2023-02-14 | End: 2023-02-16 | Stop reason: HOSPADM

## 2023-02-14 RX ORDER — HYDROMORPHONE HCL IN WATER/PF 6 MG/30 ML
0.4 PATIENT CONTROLLED ANALGESIA SYRINGE INTRAVENOUS
Status: DISCONTINUED | OUTPATIENT
Start: 2023-02-14 | End: 2023-02-16 | Stop reason: HOSPADM

## 2023-02-14 RX ORDER — NALOXONE HYDROCHLORIDE 0.4 MG/ML
0.4 INJECTION, SOLUTION INTRAMUSCULAR; INTRAVENOUS; SUBCUTANEOUS
Status: DISCONTINUED | OUTPATIENT
Start: 2023-02-14 | End: 2023-02-16 | Stop reason: HOSPADM

## 2023-02-14 RX ORDER — ACETAMINOPHEN 325 MG/1
975 TABLET ORAL ONCE
Status: COMPLETED | OUTPATIENT
Start: 2023-02-14 | End: 2023-02-14

## 2023-02-14 RX ORDER — METHOCARBAMOL 500 MG/1
500 TABLET, FILM COATED ORAL EVERY 6 HOURS PRN
Status: DISCONTINUED | OUTPATIENT
Start: 2023-02-14 | End: 2023-02-16 | Stop reason: HOSPADM

## 2023-02-14 RX ORDER — OXYCODONE HYDROCHLORIDE 5 MG/1
5 TABLET ORAL EVERY 4 HOURS PRN
Status: DISCONTINUED | OUTPATIENT
Start: 2023-02-14 | End: 2023-02-14 | Stop reason: HOSPADM

## 2023-02-14 RX ORDER — HYDROMORPHONE HYDROCHLORIDE 2 MG/1
2 TABLET ORAL EVERY 4 HOURS PRN
Status: DISCONTINUED | OUTPATIENT
Start: 2023-02-14 | End: 2023-02-16 | Stop reason: HOSPADM

## 2023-02-14 RX ORDER — PROPOFOL 10 MG/ML
INJECTION, EMULSION INTRAVENOUS PRN
Status: DISCONTINUED | OUTPATIENT
Start: 2023-02-14 | End: 2023-02-14

## 2023-02-14 RX ORDER — FENTANYL CITRATE 50 UG/ML
50 INJECTION, SOLUTION INTRAMUSCULAR; INTRAVENOUS
Status: DISCONTINUED | OUTPATIENT
Start: 2023-02-14 | End: 2023-02-14

## 2023-02-14 RX ORDER — LIDOCAINE 40 MG/G
CREAM TOPICAL
Status: DISCONTINUED | OUTPATIENT
Start: 2023-02-14 | End: 2023-02-14 | Stop reason: HOSPADM

## 2023-02-14 RX ORDER — FENTANYL CITRATE 50 UG/ML
50 INJECTION, SOLUTION INTRAMUSCULAR; INTRAVENOUS EVERY 5 MIN PRN
Status: DISCONTINUED | OUTPATIENT
Start: 2023-02-14 | End: 2023-02-14 | Stop reason: HOSPADM

## 2023-02-14 RX ORDER — NALOXONE HYDROCHLORIDE 0.4 MG/ML
0.2 INJECTION, SOLUTION INTRAMUSCULAR; INTRAVENOUS; SUBCUTANEOUS
Status: DISCONTINUED | OUTPATIENT
Start: 2023-02-14 | End: 2023-02-16 | Stop reason: HOSPADM

## 2023-02-14 RX ORDER — METFORMIN HCL 500 MG
500 TABLET, EXTENDED RELEASE 24 HR ORAL
Status: DISCONTINUED | OUTPATIENT
Start: 2023-02-15 | End: 2023-02-16 | Stop reason: HOSPADM

## 2023-02-14 RX ORDER — ONDANSETRON 2 MG/ML
4 INJECTION INTRAMUSCULAR; INTRAVENOUS EVERY 30 MIN PRN
Status: DISCONTINUED | OUTPATIENT
Start: 2023-02-14 | End: 2023-02-14 | Stop reason: HOSPADM

## 2023-02-14 RX ORDER — DEXTROSE MONOHYDRATE 25 G/50ML
25-50 INJECTION, SOLUTION INTRAVENOUS
Status: DISCONTINUED | OUTPATIENT
Start: 2023-02-14 | End: 2023-02-16 | Stop reason: HOSPADM

## 2023-02-14 RX ORDER — LISINOPRIL 20 MG/1
20 TABLET ORAL DAILY
Status: DISCONTINUED | OUTPATIENT
Start: 2023-02-15 | End: 2023-02-16 | Stop reason: HOSPADM

## 2023-02-14 RX ORDER — SODIUM CHLORIDE, SODIUM LACTATE, POTASSIUM CHLORIDE, CALCIUM CHLORIDE 600; 310; 30; 20 MG/100ML; MG/100ML; MG/100ML; MG/100ML
INJECTION, SOLUTION INTRAVENOUS CONTINUOUS
Status: DISCONTINUED | OUTPATIENT
Start: 2023-02-14 | End: 2023-02-14 | Stop reason: HOSPADM

## 2023-02-14 RX ORDER — ONDANSETRON 2 MG/ML
INJECTION INTRAMUSCULAR; INTRAVENOUS PRN
Status: DISCONTINUED | OUTPATIENT
Start: 2023-02-14 | End: 2023-02-14

## 2023-02-14 RX ORDER — BUPIVACAINE HYDROCHLORIDE AND EPINEPHRINE 2.5; 5 MG/ML; UG/ML
INJECTION, SOLUTION EPIDURAL; INFILTRATION; INTRACAUDAL; PERINEURAL PRN
Status: DISCONTINUED | OUTPATIENT
Start: 2023-02-14 | End: 2023-02-14 | Stop reason: HOSPADM

## 2023-02-14 RX ORDER — HYDROMORPHONE HCL IN WATER/PF 6 MG/30 ML
0.2 PATIENT CONTROLLED ANALGESIA SYRINGE INTRAVENOUS
Status: DISCONTINUED | OUTPATIENT
Start: 2023-02-14 | End: 2023-02-16 | Stop reason: HOSPADM

## 2023-02-14 RX ORDER — EPHEDRINE SULFATE 50 MG/ML
INJECTION, SOLUTION INTRAMUSCULAR; INTRAVENOUS; SUBCUTANEOUS PRN
Status: DISCONTINUED | OUTPATIENT
Start: 2023-02-14 | End: 2023-02-14

## 2023-02-14 RX ORDER — ONDANSETRON 4 MG/1
4 TABLET, ORALLY DISINTEGRATING ORAL EVERY 6 HOURS PRN
Status: DISCONTINUED | OUTPATIENT
Start: 2023-02-14 | End: 2023-02-16 | Stop reason: HOSPADM

## 2023-02-14 RX ORDER — OXYCODONE HYDROCHLORIDE 5 MG/1
5 TABLET ORAL EVERY 4 HOURS PRN
Status: DISCONTINUED | OUTPATIENT
Start: 2023-02-14 | End: 2023-02-14

## 2023-02-14 RX ORDER — PROCHLORPERAZINE MALEATE 5 MG
5 TABLET ORAL EVERY 6 HOURS PRN
Status: DISCONTINUED | OUTPATIENT
Start: 2023-02-14 | End: 2023-02-16 | Stop reason: HOSPADM

## 2023-02-14 RX ORDER — GLYCOPYRROLATE 0.2 MG/ML
INJECTION, SOLUTION INTRAMUSCULAR; INTRAVENOUS PRN
Status: DISCONTINUED | OUTPATIENT
Start: 2023-02-14 | End: 2023-02-14

## 2023-02-14 RX ORDER — LIDOCAINE HYDROCHLORIDE 10 MG/ML
INJECTION, SOLUTION INFILTRATION; PERINEURAL PRN
Status: DISCONTINUED | OUTPATIENT
Start: 2023-02-14 | End: 2023-02-14

## 2023-02-14 RX ORDER — MAGNESIUM HYDROXIDE 1200 MG/15ML
LIQUID ORAL PRN
Status: DISCONTINUED | OUTPATIENT
Start: 2023-02-14 | End: 2023-02-14 | Stop reason: HOSPADM

## 2023-02-14 RX ORDER — FENTANYL CITRATE 50 UG/ML
25 INJECTION, SOLUTION INTRAMUSCULAR; INTRAVENOUS EVERY 5 MIN PRN
Status: DISCONTINUED | OUTPATIENT
Start: 2023-02-14 | End: 2023-02-14 | Stop reason: HOSPADM

## 2023-02-14 RX ORDER — SODIUM CHLORIDE 9 MG/ML
INJECTION, SOLUTION INTRAVENOUS CONTINUOUS
Status: DISCONTINUED | OUTPATIENT
Start: 2023-02-14 | End: 2023-02-16 | Stop reason: HOSPADM

## 2023-02-14 RX ORDER — LIDOCAINE 40 MG/G
CREAM TOPICAL
Status: DISCONTINUED | OUTPATIENT
Start: 2023-02-14 | End: 2023-02-16 | Stop reason: HOSPADM

## 2023-02-14 RX ORDER — TRANEXAMIC ACID 10 MG/ML
1 INJECTION, SOLUTION INTRAVENOUS ONCE
Status: COMPLETED | OUTPATIENT
Start: 2023-02-14 | End: 2023-02-14

## 2023-02-14 RX ORDER — FENTANYL CITRATE 50 UG/ML
INJECTION, SOLUTION INTRAMUSCULAR; INTRAVENOUS PRN
Status: DISCONTINUED | OUTPATIENT
Start: 2023-02-14 | End: 2023-02-14

## 2023-02-14 RX ORDER — HYDROMORPHONE HYDROCHLORIDE 2 MG/1
4 TABLET ORAL EVERY 4 HOURS PRN
Status: DISCONTINUED | OUTPATIENT
Start: 2023-02-14 | End: 2023-02-16 | Stop reason: HOSPADM

## 2023-02-14 RX ORDER — ATORVASTATIN CALCIUM 40 MG/1
40 TABLET, FILM COATED ORAL DAILY
Status: DISCONTINUED | OUTPATIENT
Start: 2023-02-14 | End: 2023-02-16 | Stop reason: HOSPADM

## 2023-02-14 RX ORDER — ACETAMINOPHEN 325 MG/1
650 TABLET ORAL EVERY 4 HOURS PRN
Status: DISCONTINUED | OUTPATIENT
Start: 2023-02-17 | End: 2023-02-16 | Stop reason: HOSPADM

## 2023-02-14 RX ORDER — OXYCODONE HYDROCHLORIDE 10 MG/1
10 TABLET ORAL EVERY 4 HOURS PRN
Status: DISCONTINUED | OUTPATIENT
Start: 2023-02-14 | End: 2023-02-14

## 2023-02-14 RX ORDER — METOPROLOL TARTRATE 1 MG/ML
1-2 INJECTION, SOLUTION INTRAVENOUS EVERY 5 MIN PRN
Status: DISCONTINUED | OUTPATIENT
Start: 2023-02-14 | End: 2023-02-14 | Stop reason: HOSPADM

## 2023-02-14 RX ORDER — HYDROMORPHONE HCL IN WATER/PF 6 MG/30 ML
0.4 PATIENT CONTROLLED ANALGESIA SYRINGE INTRAVENOUS EVERY 5 MIN PRN
Status: DISCONTINUED | OUTPATIENT
Start: 2023-02-14 | End: 2023-02-14 | Stop reason: HOSPADM

## 2023-02-14 RX ORDER — BISACODYL 10 MG
10 SUPPOSITORY, RECTAL RECTAL DAILY PRN
Status: DISCONTINUED | OUTPATIENT
Start: 2023-02-14 | End: 2023-02-16 | Stop reason: HOSPADM

## 2023-02-14 RX ORDER — AMOXICILLIN 250 MG
1 CAPSULE ORAL 2 TIMES DAILY
Status: DISCONTINUED | OUTPATIENT
Start: 2023-02-14 | End: 2023-02-16 | Stop reason: HOSPADM

## 2023-02-14 RX ADMIN — PHENYLEPHRINE HYDROCHLORIDE 100 MCG: 10 INJECTION INTRAVENOUS at 10:06

## 2023-02-14 RX ADMIN — HYDROMORPHONE HYDROCHLORIDE 2 MG: 2 TABLET ORAL at 22:50

## 2023-02-14 RX ADMIN — GLYCOPYRROLATE 0.2 MG: 0.2 INJECTION, SOLUTION INTRAMUSCULAR; INTRAVENOUS at 07:48

## 2023-02-14 RX ADMIN — SODIUM CHLORIDE, POTASSIUM CHLORIDE, SODIUM LACTATE AND CALCIUM CHLORIDE: 600; 310; 30; 20 INJECTION, SOLUTION INTRAVENOUS at 07:42

## 2023-02-14 RX ADMIN — SODIUM CHLORIDE, POTASSIUM CHLORIDE, SODIUM LACTATE AND CALCIUM CHLORIDE: 600; 310; 30; 20 INJECTION, SOLUTION INTRAVENOUS at 08:17

## 2023-02-14 RX ADMIN — PHENYLEPHRINE HYDROCHLORIDE 100 MCG: 10 INJECTION INTRAVENOUS at 08:32

## 2023-02-14 RX ADMIN — Medication 1 TABLET: at 18:20

## 2023-02-14 RX ADMIN — ATORVASTATIN CALCIUM 40 MG: 40 TABLET, FILM COATED ORAL at 19:51

## 2023-02-14 RX ADMIN — FENTANYL CITRATE 100 MCG: 50 INJECTION, SOLUTION INTRAMUSCULAR; INTRAVENOUS at 08:09

## 2023-02-14 RX ADMIN — Medication 2 G: at 11:39

## 2023-02-14 RX ADMIN — PHENYLEPHRINE HYDROCHLORIDE 200 MCG: 10 INJECTION INTRAVENOUS at 09:13

## 2023-02-14 RX ADMIN — FENTANYL CITRATE 25 MCG: 50 INJECTION INTRAMUSCULAR; INTRAVENOUS at 13:24

## 2023-02-14 RX ADMIN — FENTANYL CITRATE 100 MCG: 50 INJECTION, SOLUTION INTRAMUSCULAR; INTRAVENOUS at 07:48

## 2023-02-14 RX ADMIN — Medication 25 MCG: at 19:52

## 2023-02-14 RX ADMIN — ACETAMINOPHEN 975 MG: 325 TABLET ORAL at 14:33

## 2023-02-14 RX ADMIN — FENTANYL CITRATE 50 MCG: 50 INJECTION, SOLUTION INTRAMUSCULAR; INTRAVENOUS at 11:28

## 2023-02-14 RX ADMIN — PROPOFOL 150 MG: 10 INJECTION, EMULSION INTRAVENOUS at 07:56

## 2023-02-14 RX ADMIN — PHENYLEPHRINE HYDROCHLORIDE 100 MCG: 10 INJECTION INTRAVENOUS at 10:48

## 2023-02-14 RX ADMIN — PHENYLEPHRINE HYDROCHLORIDE 200 MCG: 10 INJECTION INTRAVENOUS at 08:50

## 2023-02-14 RX ADMIN — Medication 10 MG: at 09:16

## 2023-02-14 RX ADMIN — Medication 5 MG: at 10:06

## 2023-02-14 RX ADMIN — PROPOFOL 50 MCG/KG/MIN: 10 INJECTION, EMULSION INTRAVENOUS at 08:13

## 2023-02-14 RX ADMIN — SODIUM CHLORIDE, POTASSIUM CHLORIDE, SODIUM LACTATE AND CALCIUM CHLORIDE: 600; 310; 30; 20 INJECTION, SOLUTION INTRAVENOUS at 10:30

## 2023-02-14 RX ADMIN — HYDROMORPHONE HYDROCHLORIDE 1 MG: 1 INJECTION, SOLUTION INTRAMUSCULAR; INTRAVENOUS; SUBCUTANEOUS at 10:06

## 2023-02-14 RX ADMIN — FENTANYL CITRATE 50 MCG: 50 INJECTION INTRAMUSCULAR; INTRAVENOUS at 12:42

## 2023-02-14 RX ADMIN — HYDROMORPHONE HYDROCHLORIDE 2 MG: 2 TABLET ORAL at 18:55

## 2023-02-14 RX ADMIN — HYDROMORPHONE HYDROCHLORIDE 2 MG: 2 TABLET ORAL at 14:34

## 2023-02-14 RX ADMIN — SENNOSIDES AND DOCUSATE SODIUM 1 TABLET: 50; 8.6 TABLET ORAL at 19:51

## 2023-02-14 RX ADMIN — CEFAZOLIN 1 G: 1 INJECTION, POWDER, FOR SOLUTION INTRAMUSCULAR; INTRAVENOUS at 19:51

## 2023-02-14 RX ADMIN — GABAPENTIN 100 MG: 100 CAPSULE ORAL at 06:28

## 2023-02-14 RX ADMIN — TRANEXAMIC ACID 1 G: 10 INJECTION, SOLUTION INTRAVENOUS at 07:42

## 2023-02-14 RX ADMIN — ACETAMINOPHEN 975 MG: 325 TABLET ORAL at 22:50

## 2023-02-14 RX ADMIN — ROCURONIUM BROMIDE 50 MG: 50 INJECTION, SOLUTION INTRAVENOUS at 07:56

## 2023-02-14 RX ADMIN — PHENYLEPHRINE HYDROCHLORIDE 100 MCG: 10 INJECTION INTRAVENOUS at 08:41

## 2023-02-14 RX ADMIN — PHENYLEPHRINE HYDROCHLORIDE 100 MCG: 10 INJECTION INTRAVENOUS at 08:16

## 2023-02-14 RX ADMIN — FENTANYL CITRATE 25 MCG: 50 INJECTION INTRAMUSCULAR; INTRAVENOUS at 13:41

## 2023-02-14 RX ADMIN — LIDOCAINE HYDROCHLORIDE 50 MG: 10 INJECTION, SOLUTION INFILTRATION; PERINEURAL at 07:56

## 2023-02-14 RX ADMIN — PHENYLEPHRINE HYDROCHLORIDE 200 MCG: 10 INJECTION INTRAVENOUS at 08:53

## 2023-02-14 RX ADMIN — Medication 10 MG: at 09:33

## 2023-02-14 RX ADMIN — SODIUM CHLORIDE: 9 INJECTION, SOLUTION INTRAVENOUS at 18:20

## 2023-02-14 RX ADMIN — HYDROXYZINE HYDROCHLORIDE 10 MG: 10 TABLET ORAL at 14:35

## 2023-02-14 RX ADMIN — ONDANSETRON 4 MG: 2 INJECTION INTRAMUSCULAR; INTRAVENOUS at 11:28

## 2023-02-14 RX ADMIN — METHOCARBAMOL 500 MG: 500 TABLET ORAL at 13:36

## 2023-02-14 RX ADMIN — Medication 2 G: at 07:42

## 2023-02-14 ASSESSMENT — ACTIVITIES OF DAILY LIVING (ADL)
ADLS_ACUITY_SCORE: 22
ADLS_ACUITY_SCORE: 23
ADLS_ACUITY_SCORE: 22
ADLS_ACUITY_SCORE: 22
ADLS_ACUITY_SCORE: 23
ADLS_ACUITY_SCORE: 22
ADLS_ACUITY_SCORE: 23

## 2023-02-14 NOTE — PROGRESS NOTES
Brief Hospital Medicine Note:    POD#0 Lumbar fusion  - reordered home meds  - Formal consult in AM    HTN  Hyperlipidemia  - resume lisinopril and statin tomorrow    Type 2 DM well controlled - A1c 6.9  - resume metformin with supper tomorrow evening  - ordered low resistance sliding scale insulin for now, with glucose checks and hypoglycemia protocol      MONCHO Barfield PA-C on 2/14/2023 at 5:34 PM

## 2023-02-14 NOTE — ANESTHESIA PREPROCEDURE EVALUATION
Anesthesia Pre-Procedure Evaluation    Patient: Sushant Zepeda   MRN: [id number] : 1945        Procedure : Procedure(s):  Lumbar 3-Sacral 1 Posterior Lumbar Instrumented Fusion With or Without Interbody Cage and With or Without Laminectomies          Past Medical History:   Diagnosis Date     Abnormal glucose      Acute right-sided low back pain with right-sided sciatica 2016     Diabetes (H)      Generalized osteoarthrosis, unspecified site      [place] (hard of hearing), bilateral      Left anterior fascicular block 2023     Mumps      Renal disease      Unspecified essential hypertension       Past Surgical History:   Procedure Laterality Date     COLONOSCOPY N/A 3/2/2017    Procedure: COMBINED COLONOSCOPY, SINGLE OR MULTIPLE BIOPSY/POLYPECTOMY BY BIOPSY;  Surgeon: Ru Deal MD;  Location:  GI     CYSTOSCOPY, TRANSURETHRAL RESECTION (TUR) PROSTATE, COMBINED Left 2019    Procedure: Video cystopanendoscopy, left retrograde ureterogram and pyelogram, exam under anesthesia;  Surgeon: Addy Sofia MD;  Location: [facility] OR     HC REMOVE TONSILS/ADENOIDS,<11 Y/O      T & A <12y.o.     ZZC APPENDECTOMY       ZZC TOTAL HIP ARTHROPLASTY      Hip Replacement, Total      Allergies   Allergen Reactions     Nsaids      aleve, ibuprofen     hives and swelling      Social History     Tobacco Use     Smoking status: Never     Smokeless tobacco: Never   Substance Use Topics     Alcohol use: Yes     Comment: Have maybe 1 drink  a week      Wt Readings from Last 1 Encounters:   23 78.9 kg (174 lb)        Anesthesia Evaluation   Pt has had prior anesthetic.         ROS/MED HX  ENT/Pulmonary:    (-) sleep apnea   Neurologic:       Cardiovascular:     (+) Dyslipidemia hypertension-----    METS/Exercise Tolerance:     Hematologic:       Musculoskeletal:       GI/Hepatic:       Renal/Genitourinary:     (+) renal disease, type: CRI,     Endo:     (+) type II DM,     Psychiatric/Substance Use:        Infectious Disease:       Malignancy:       Other:            Physical Exam    Airway        Mallampati: II    Neck ROM: full     Respiratory Devices and Support         Dental           Cardiovascular             Pulmonary   pulmonary exam normal                OUTSIDE LABS:  CBC:   Lab Results   Component Value Date    WBC 4.4 01/17/2023    HGB 14.3 01/17/2023    HGB 13.9 11/15/2021    HCT 41.3 01/17/2023     01/17/2023     BMP:   Lab Results   Component Value Date     02/03/2023     (L) 01/17/2023    POTASSIUM 4.9 01/17/2023    POTASSIUM 4.4 08/24/2022    CHLORIDE 101 01/17/2023    CHLORIDE 105 08/24/2022    CO2 20 (L) 01/17/2023    CO2 21 08/24/2022    BUN 25.4 (H) 01/17/2023    BUN 28 08/24/2022    CR 1.16 02/03/2023    CR 1.44 (H) 01/17/2023     (H) 02/14/2023     (H) 01/17/2023     COAGS: No results found for: PTT, INR, FIBR  POC:   Lab Results   Component Value Date    BGM 90 01/08/2019     HEPATIC:   Lab Results   Component Value Date    ALBUMIN 4.3 08/26/2020    PROTTOTAL 8.2 08/26/2020    ALT 25 12/12/2022    AST 17 08/26/2020    ALKPHOS 121 08/26/2020    BILITOTAL 0.4 08/26/2020     OTHER:   Lab Results   Component Value Date    A1C 6.9 (H) 01/17/2023    JIN 9.6 01/17/2023    PHOS 2.8 11/15/2021    MAG 2.1 03/15/2022    TSH 2.13 03/15/2022       Anesthesia Plan    ASA Status:  3   NPO Status:  NPO Appropriate    Anesthesia Type: General.     - Airway: ETT   Induction: Intravenous.   Maintenance: Balanced.        Consents    Anesthesia Plan(s) and associated risks, benefits, and realistic alternatives discussed. Questions answered and patient/representative(s) expressed understanding.    - Discussed:     - Discussed with:  Patient         Postoperative Care    Pain management: IV analgesics, Oral pain medications, Multi-modal analgesia.   PONV prophylaxis: Ondansetron (or other 5HT-3), Dexamethasone or Solumedrol     Comments:                Roberth Atkins MD

## 2023-02-14 NOTE — ANESTHESIA POSTPROCEDURE EVALUATION
Patient: Sushant Zepeda    Procedure: Procedure(s):  Lumbar 3-Sacral 1 Posterior Lumbar Instrumented Fusion With Interbody Cage and Laminectomies       Anesthesia Type:  General    Note:  Disposition: Inpatient   Postop Pain Control: Uneventful            Sign Out: Well controlled pain   PONV: No   Neuro/Psych: Uneventful            Sign Out: Acceptable/Baseline neuro status   Airway/Respiratory: Uneventful            Sign Out: Acceptable/Baseline resp. status   CV/Hemodynamics: Uneventful            Sign Out: Acceptable CV status; No obvious hypovolemia; No obvious fluid overload   Other NRE: NONE   DID A NON-ROUTINE EVENT OCCUR? No           Last vitals:  Vitals Value Taken Time   /70 02/14/23 1700   Temp 98.2  F (36.8  C) 02/14/23 1445   Pulse 87 02/14/23 1700   Resp 9 02/14/23 1645   SpO2 99 % 02/14/23 1659   Vitals shown include unvalidated device data.    Electronically Signed By: Aristides Figueroa MD  February 14, 2023  5:09 PM

## 2023-02-14 NOTE — ANESTHESIA PROCEDURE NOTES
Airway       Patient location during procedure: OR       Procedure Start/Stop Times: 2/14/2023 7:57 AM  Staff -        CRNA: William Bright APRN CRNA       Performed By: CRNA  Consent for Airway        Urgency: elective  Indications and Patient Condition       Indications for airway management: beata-procedural       Induction type:intravenous       Mask difficulty assessment: 1 - vent by mask    Final Airway Details       Final airway type: endotracheal airway       Successful airway: ETT - single and Oral  Endotracheal Airway Details        ETT size (mm): 8.0       Cuffed: yes       Successful intubation technique: direct laryngoscopy       DL Blade Type: Malagon 2       Grade View of Cords: 1       Adjucts: stylet       Position: Right       Measured from: gums/teeth       Secured at (cm): 23       Bite block used: Soft    Post intubation assessment        Placement verified by: capnometry, equal breath sounds and chest rise        Number of attempts at approach: 1       Number of other approaches attempted: 0       Secured with: plastic tape       Ease of procedure: easy       Dentition: Intact    Medication(s) Administered   Medication Administration Time: 2/14/2023 7:57 AM

## 2023-02-14 NOTE — BRIEF OP NOTE
[facility] Brief Operative Note    Pre-operative diagnosis: Lumbar spinal stenosis [M48.061]  Neurogenic claudication (H) [G95.19]  Spondylolisthesis [M43.10]  Degenerative disc disease, lumbar [M51.36]  Up Down Stenosis L5S1  Vestigal S1-S2 disc  Bilateral leg radiculopathy/claudication     Post-operative diagnosis * No post-op diagnosis entered *  Same     Procedure: Procedure(s):  Lumbar 3-Sacral 1 Posterior Lumbar Instrumented Fusion With Interbody Cage and Laminectomies L3-S1  Durant Rufus Osteotomy L5S1     Surgeon(s): Surgeon(s) and Role:     * Lam Castellanos MD - Primary     * Ernesto Huston PA-C - Assisting   Estimated blood loss: 200 mL    Specimens: * No specimens in log *   Findings: No compication    Lam Castellanos MD

## 2023-02-14 NOTE — ANESTHESIA CARE TRANSFER NOTE
Patient: Sushant Zepeda    Procedure: Procedure(s):  Lumbar 3-Sacral 1 Posterior Lumbar Instrumented Fusion With Interbody Cage and Laminectomies       Diagnosis: Lumbar spinal stenosis [M48.061]  Neurogenic claudication (H) [G95.19]  Spondylolisthesis [M43.10]  Degenerative disc disease, lumbar [M51.36]  Spinal instability [M53.2X9]  Diagnosis Additional Information: No value filed.    Anesthesia Type:   General     Note:    Oropharynx: spontaneously breathing  Level of Consciousness: drowsy and awake  Oxygen Supplementation: face mask  Level of Supplemental Oxygen (L/min / FiO2): 6  Independent Airway: airway patency satisfactory and stable  Dentition: dentition unchanged  Vital Signs Stable: post-procedure vital signs reviewed and stable  Report to RN Given: handoff report given  Patient transferred to: PACU    Handoff Report: Identifed the Patient, Identified the Reponsible Provider, Reviewed the pertinent medical history, Discussed the surgical course, Reviewed Intra-OP anesthesia mangement and issues during anesthesia, Set expectations for post-procedure period and Allowed opportunity for questions and acknowledgement of understanding      Vitals:  Vitals Value Taken Time   BP     Temp     Pulse 90 02/14/23 1209   Resp 43 02/14/23 1209   SpO2 97 % 02/14/23 1209   Vitals shown include unvalidated device data.    Electronically Signed By: LISSETH Clinton CRNA  February 14, 2023  12:10 PM

## 2023-02-15 ENCOUNTER — APPOINTMENT (OUTPATIENT)
Dept: PHYSICAL THERAPY | Facility: CLINIC | Age: 78
DRG: 454 | End: 2023-02-15
Attending: ORTHOPAEDIC SURGERY
Payer: MEDICARE

## 2023-02-15 ENCOUNTER — APPOINTMENT (OUTPATIENT)
Dept: OCCUPATIONAL THERAPY | Facility: CLINIC | Age: 78
DRG: 454 | End: 2023-02-15
Attending: ORTHOPAEDIC SURGERY
Payer: MEDICARE

## 2023-02-15 LAB
ANION GAP SERPL CALCULATED.3IONS-SCNC: 10 MMOL/L (ref 7–15)
BUN SERPL-MCNC: 16.7 MG/DL (ref 8–23)
CALCIUM SERPL-MCNC: 8.8 MG/DL (ref 8.8–10.2)
CHLORIDE SERPL-SCNC: 103 MMOL/L (ref 98–107)
CREAT SERPL-MCNC: 1.03 MG/DL (ref 0.67–1.17)
DEPRECATED HCO3 PLAS-SCNC: 25 MMOL/L (ref 22–29)
GFR SERPL CREATININE-BSD FRML MDRD: 75 ML/MIN/1.73M2
GLUCOSE BLDC GLUCOMTR-MCNC: 113 MG/DL (ref 70–99)
GLUCOSE BLDC GLUCOMTR-MCNC: 144 MG/DL (ref 70–99)
GLUCOSE BLDC GLUCOMTR-MCNC: 165 MG/DL (ref 70–99)
GLUCOSE BLDC GLUCOMTR-MCNC: 185 MG/DL (ref 70–99)
GLUCOSE SERPL-MCNC: 135 MG/DL (ref 70–99)
HGB BLD-MCNC: 11.7 G/DL (ref 13.3–17.7)
POTASSIUM SERPL-SCNC: 4.2 MMOL/L (ref 3.4–5.3)
SODIUM SERPL-SCNC: 138 MMOL/L (ref 136–145)

## 2023-02-15 PROCEDURE — 250N000013 HC RX MED GY IP 250 OP 250 PS 637

## 2023-02-15 PROCEDURE — 97535 SELF CARE MNGMENT TRAINING: CPT | Mod: GO | Performed by: REHABILITATION PRACTITIONER

## 2023-02-15 PROCEDURE — 99223 1ST HOSP IP/OBS HIGH 75: CPT | Performed by: PHYSICIAN ASSISTANT

## 2023-02-15 PROCEDURE — 97165 OT EVAL LOW COMPLEX 30 MIN: CPT | Mod: GO | Performed by: REHABILITATION PRACTITIONER

## 2023-02-15 PROCEDURE — 85018 HEMOGLOBIN: CPT | Performed by: PHYSICIAN ASSISTANT

## 2023-02-15 PROCEDURE — 80048 BASIC METABOLIC PNL TOTAL CA: CPT

## 2023-02-15 PROCEDURE — 250N000012 HC RX MED GY IP 250 OP 636 PS 637

## 2023-02-15 PROCEDURE — 36415 COLL VENOUS BLD VENIPUNCTURE: CPT | Performed by: PHYSICIAN ASSISTANT

## 2023-02-15 PROCEDURE — 250N000013 HC RX MED GY IP 250 OP 250 PS 637: Performed by: PHYSICIAN ASSISTANT

## 2023-02-15 PROCEDURE — 250N000011 HC RX IP 250 OP 636: Performed by: PHYSICIAN ASSISTANT

## 2023-02-15 PROCEDURE — 120N000001 HC R&B MED SURG/OB

## 2023-02-15 PROCEDURE — 97161 PT EVAL LOW COMPLEX 20 MIN: CPT | Mod: GP | Performed by: PHYSICAL THERAPIST

## 2023-02-15 PROCEDURE — 97530 THERAPEUTIC ACTIVITIES: CPT | Mod: GP | Performed by: PHYSICAL THERAPIST

## 2023-02-15 RX ADMIN — Medication 1 TABLET: at 16:58

## 2023-02-15 RX ADMIN — METHOCARBAMOL 500 MG: 500 TABLET ORAL at 20:47

## 2023-02-15 RX ADMIN — Medication 25 MCG: at 08:13

## 2023-02-15 RX ADMIN — Medication 1 TABLET: at 06:46

## 2023-02-15 RX ADMIN — INSULIN ASPART 1 UNITS: 100 INJECTION, SOLUTION INTRAVENOUS; SUBCUTANEOUS at 17:00

## 2023-02-15 RX ADMIN — HYDROMORPHONE HYDROCHLORIDE 4 MG: 2 TABLET ORAL at 08:14

## 2023-02-15 RX ADMIN — Medication 1 TABLET: at 11:39

## 2023-02-15 RX ADMIN — INSULIN ASPART 1 UNITS: 100 INJECTION, SOLUTION INTRAVENOUS; SUBCUTANEOUS at 11:41

## 2023-02-15 RX ADMIN — ATORVASTATIN CALCIUM 40 MG: 40 TABLET, FILM COATED ORAL at 20:47

## 2023-02-15 RX ADMIN — HYDROMORPHONE HYDROCHLORIDE 4 MG: 2 TABLET ORAL at 03:03

## 2023-02-15 RX ADMIN — METHOCARBAMOL 500 MG: 500 TABLET ORAL at 12:17

## 2023-02-15 RX ADMIN — CEFAZOLIN 1 G: 1 INJECTION, POWDER, FOR SOLUTION INTRAMUSCULAR; INTRAVENOUS at 04:43

## 2023-02-15 RX ADMIN — Medication 25 MCG: at 20:48

## 2023-02-15 RX ADMIN — HYDROMORPHONE HYDROCHLORIDE 2 MG: 2 TABLET ORAL at 18:58

## 2023-02-15 RX ADMIN — ACETAMINOPHEN 975 MG: 325 TABLET ORAL at 22:09

## 2023-02-15 RX ADMIN — SENNOSIDES AND DOCUSATE SODIUM 1 TABLET: 50; 8.6 TABLET ORAL at 08:13

## 2023-02-15 RX ADMIN — METFORMIN HYDROCHLORIDE 500 MG: 500 TABLET, EXTENDED RELEASE ORAL at 16:58

## 2023-02-15 RX ADMIN — SENNOSIDES AND DOCUSATE SODIUM 1 TABLET: 50; 8.6 TABLET ORAL at 20:47

## 2023-02-15 RX ADMIN — ACETAMINOPHEN 975 MG: 325 TABLET ORAL at 05:59

## 2023-02-15 RX ADMIN — ACETAMINOPHEN 975 MG: 325 TABLET ORAL at 13:38

## 2023-02-15 RX ADMIN — METHOCARBAMOL 500 MG: 500 TABLET ORAL at 06:46

## 2023-02-15 RX ADMIN — HYDROMORPHONE HYDROCHLORIDE 4 MG: 2 TABLET ORAL at 12:17

## 2023-02-15 RX ADMIN — METHOCARBAMOL 500 MG: 500 TABLET ORAL at 00:32

## 2023-02-15 RX ADMIN — HYDROXYZINE HYDROCHLORIDE 10 MG: 10 TABLET ORAL at 20:47

## 2023-02-15 ASSESSMENT — ACTIVITIES OF DAILY LIVING (ADL)
ADLS_ACUITY_SCORE: 23
ADLS_ACUITY_SCORE: 23
IADL_COMMENTS: SPOUSE TO COMPLETE AS NEEDED
ADLS_ACUITY_SCORE: 23
ADLS_ACUITY_SCORE: 28
ADLS_ACUITY_SCORE: 23
ADLS_ACUITY_SCORE: 23
ADLS_ACUITY_SCORE: 28
ADLS_ACUITY_SCORE: 23
ADLS_ACUITY_SCORE: 28
ADLS_ACUITY_SCORE: 28

## 2023-02-15 NOTE — PLAN OF CARE
Goal Outcome Evaluation:      Plan of Care Reviewed With: patient    Overall Patient Progress: improvingOverall Patient Progress: improving    Outcome Evaluation: Plan is to d/c on Thursday    Patient vital signs are at baseline: No,  Reason:  On 2L of O2  Patient able to ambulate as they were prior to admission or with assist devices provided by therapies during their stay:  No,  Reason:  1 assist with gait belt and walker  Patient MUST void prior to discharge:  Yes, has a boo, at home straight caths regularly   Patient able to tolerate oral intake:  Yes  Pain has adequate pain control using Oral analgesics:  Yes, dilaudid last administered at 0303, Tylenol at 0606, Robaxin at 0650  Does patient have an identified :  Yes  Has goal D/C date and time been discussed with patient:  Plan is to discharge on Thursday    Patient is alert and oriented x4, walker at 2000 from bed to door and back with walker and gait belt, walked at 0600 60ft, on IV cefazolin, IV saline locked, dressing is clean, dry, and intact, CMS intact. Plan is to discharge home on Thursday.

## 2023-02-15 NOTE — PROGRESS NOTES
"Ortho Rounding Note    S:voices no major medical concerns  No sob/cp/nv  Pain well controlled    O:  Vital signs:   Blood pressure 128/59, pulse 80, temperature 99.2  F (37.3  C), temperature source Temporal, resp. rate 18, height 1.651 m (5' 5\"), weight 78.9 kg (174 lb), SpO2 95 %.  Estimated body mass index is 28.96 kg/m  as calculated from the following:    Height as of this encounter: 1.651 m (5' 5\").    Weight as of this encounter: 78.9 kg (174 lb).      Intake/Output Summary (Last 24 hours) at 2/15/2023 1710  Last data filed at 2/15/2023 1600  Gross per 24 hour   Intake 365 ml   Output 3820 ml   Net -3455 ml         Drain intact  Dressings c/d/i  5/5 motor and SPLT in BL UE and LE    A:  POD #1 s/p L3-S1 TLIF, bilateral facetectomy/osteotomy L5S1    P:  General: awake and appears to be doing very well.  Straight caths at baseline 4x daily at home  Pain: po/iv  Act: up ad jasmyne, with therapy  DVT: Mech only  ID: routine postop abx to be completed 24 hours after surgery  Dispo: OK to DC once passes PT, medically stable, tolerating food, urinating  Appreciate Consult Services assistance    Lam Castellanos MD        "

## 2023-02-15 NOTE — PROGRESS NOTES
02/15/23 0904   Appointment Info   Signing Clinician's Name / Credentials (PT) Radha Dallas, DPREILLY   Living Environment   People in Home spouse   Current Living Arrangements apartment   Living Environment Comments Pt lives in an apartment with elevator access   Self-Care   Equipment Currently Used at Home walker, rolling;cane, straight  (could get a shower chair, family has one.)   Fall history within last six months no   General Information   Onset of Illness/Injury or Date of Surgery 02/14/23   Referring Physician Dr. Castellanos   Patient/Family Therapy Goals Statement (PT) Pt is hopeful to DC home   Pertinent History of Current Problem (include personal factors and/or comorbidities that impact the POC) Sushant Zepeda is a 77 year old male with a history of Type II DM, HLD, atonic bladder with severe L hydronephrosis, CKD II, HTN, OA, prior DEVIKA, Spinal stenosis, DGD, who was admitted for  TLIF L3-S1 and laminectomies L3-S1.   Existing Precautions/Restrictions brace worn when out of bed;fall;spinal   Cognition   Affect/Mental Status (Cognition) WNL   Orientation Status (Cognition) oriented x 4   Follows Commands (Cognition) WNL   Pain Assessment   Patient Currently in Pain Yes, see Vital Sign flowsheet   Posture    Posture Forward head position   Range of Motion (ROM)   Range of Motion ROM deficits secondary to surgical procedure   Strength (Manual Muscle Testing)   Strength (Manual Muscle Testing) Deficits observed during functional mobility   Strength Comments R LE weaker than R unable to perform SLR on the R   Bed Mobility   Comment, (Bed Mobility) min A   Transfers   Comment, (Transfers) CGA FWW   Gait/Stairs (Locomotion)   Comment, (Gait/Stairs) CGA with FWW, able to wt shift no LOB   Balance   Balance Comments no LOB with ambulation with FWW   Sensory Examination   Sensory Perception Comments no sensory deficits with LT to quads   Clinical Impression   Criteria for Skilled Therapeutic Intervention Yes, treatment  indicated   PT Diagnosis (PT) decreased functional mobility   Influenced by the following impairments decreased ROM, decreased strength, spinal prec   Functional limitations due to impairments decreased bed mob, transfers, decreased ambulation   Clinical Presentation (PT Evaluation Complexity) Stable/Uncomplicated   Clinical Presentation Rationale improving   Clinical Decision Making (Complexity) low complexity   Planned Therapy Interventions (PT) balance training;bed mobility training;gait training;strengthening;home exercise program;patient/family education;transfer training;progressive activity/exercise;risk factor education;home program guidelines   Risk & Benefits of therapy have been explained evaluation/treatment results reviewed;care plan/treatment goals reviewed;risks/benefits reviewed;current/potential barriers reviewed;participants voiced agreement with care plan;participants included;patient   PT Total Evaluation Time   PT Eval, Low Complexity Minutes (43939) 5   Physical Therapy Goals   PT Frequency 2x/day   PT Predicted Duration/Target Date for Goal Attainment 02/15/23   PT Goals Bed Mobility;Transfers;Gait   PT: Bed Mobility Modified independent;Supine to/from sit;Within precautions   PT: Transfers Sit to/from stand;Modified independent;Bed to/from chair;Assistive device   PT: Gait Modified independent;100 feet;Rolling walker   Interventions   Interventions Quick Adds Therapeutic Activity   Therapeutic Activity   Therapeutic Activities: dynamic activities to improve functional performance Minutes (37727) 30   Symptoms Noted During/After Treatment Increased pain   Treatment Detail/Skilled Intervention Pt greeted supine in bed, agreeable with therapy. Pt cued for logroll for sitting at EOB. At EOB, increased time discussing precautions and applying lumbar brace. Pt cued for STS with FWW CGA x2 with verbal cues for safe hand placement. Pt cued for standing marches with and w/o AD, demonstrates adequate  strength. Pt then cued to ambulate 200' with FWW CGA, reporting increased pain with movement. Pt required verbal cues for walker proximity and upright posture. Pt cued for sitting in recliner, demonstrating proper hand placement. Pt re-educated regarding precautions and suggested to reposition in 45 minutes. Pt in recliner, alarm active, and call light within reach.   PT Discharge Planning   PT Plan Progress ambulation, re-education, cane vs FWW   PT Discharge Recommendation (DC Rec) home with assist;home with outpatient physical therapy   PT Rationale for DC Rec Pt below baseline for mobility, requiring FWW. Pt reports spouse able to assist at home, no stairs to ascend/descend. Pt demonstrated adequate understanding regarding brace and precautions. Anticipating pt able to return to home with FWW safely, will benefit from OP PT to improve strength and functional independence.   PT Brief overview of current status A x 1 FWW CGA, brace when OOB   Total Session Time   Timed Code Treatment Minutes 30   Total Session Time (sum of timed and untimed services) 35

## 2023-02-15 NOTE — CONSULTS
[facility]  Hospitalist Consult Note  Name: Sushant Zepeda    MRN: [id number]  YOB: 1945    Age: 77 year old  Date of admission: 2/14/2023  Primary care provider: Kentrell Urias     Requesting Physician:  Ernesto Huston PA-C  Reason for consult:  Post-operative medical management         Assessment and Plan:   Sushant Zepeda is a 77 year old male with a history of Type II DM, HLD, atonic bladder with severe L hydronephrosis, CKD II, HTN, OA, prior DEVIKA, Spinal stenosis, DGD, who was admitted for  TLIF L3-S1 and laminectomies L3-S1.     Severe Spinal Stenosis, DGD, Chronic Claudicatory, radicular leg symptoms s/p TLIF L3-S1 on 2/14/2023: The patient is doing well, currently has well controlled pain and is hemodynamically stable. Will defer diet, activity, DVT prophylaxis, and pain control to the primary team. Currently the patient is on tylenol, dilaudid, atarax. Continue physical and occupational therapy. Continue incentive spirometry and check hemoglobin to evaluate for surgical blood loss (EBL 200cc) AM hgb of 11.7, and potential need for transfusion. Follow temp curve.     Atonic bladder on ISC, severe left hydronephrosis, CKD stage II   Follows with Urology ,Typically straight cath 3-5x day at home.  Renal function stable, has boo in place.  - timing of boo removal anticipate 2/16 am per discussion with RN he will resume straight cath following this  - follow up with Urology as previously directed      HLD  - continue statin    HTN  - continue lisinopril    Type II DM  Well controlled with a1c of 6.9  - resume Metformin     Code status: Full   Prophylaxis: PCD  Disposition: home 2/16 with support once cleared by therapy, drain remove, pain control etc.    Thank you for the consultation, we will continue to follow along during the hospitalization. Please page with any questions or concerns.         History of Present Illness:   Sushant Zepeda is a 77 year old male who was  hospitalized for  TLIF L3-S1 and laminectomies L3-S1. He had years of back pain, since November after a cruise worsening symptoms of back pain and had been ambulating with cane. Pre-operative note was fully reviewed and recommendations acknowledged. Op note and anesthesia notes and flow sheets reviewed.     The patient had no complications related to the procedure and has had an unremarkable post-operative course to this point. Currently pain is adequately controlled. No nausea, vomiting, diarrhea or constipation. Low grade temp noted this am 99.9F no fever or other symptoms on scheduled tylenol.   No diaphoresis. No chest pain, palpitations, dyspnea. Tucker catheter still in place. Ciaran fevers, erating oral intake. No excessive somnolence and patient is fully alert and oriented. The patient has no other complaints at this time. He has been up with the walker already. Wondering about grab bars for his toilet at home.                 Past Medical History:     Past Medical History:   Diagnosis Date     Abnormal glucose      Acute right-sided low back pain with right-sided sciatica 06/27/2016     Diabetes (H)      Generalized osteoarthrosis, unspecified site      [place] (hard of hearing), bilateral      Left anterior fascicular block 1/17/2023     Mumps      Renal disease      Unspecified essential hypertension              Past Surgical History:     Past Surgical History:   Procedure Laterality Date     COLONOSCOPY N/A 3/2/2017    Procedure: COMBINED COLONOSCOPY, SINGLE OR MULTIPLE BIOPSY/POLYPECTOMY BY BIOPSY;  Surgeon: Ru Deal MD;  Location:  GI     CYSTOSCOPY, TRANSURETHRAL RESECTION (TUR) PROSTATE, COMBINED Left 1/8/2019    Procedure: Video cystopanendoscopy, left retrograde ureterogram and pyelogram, exam under anesthesia;  Surgeon: Addy Sofia MD;  Location:  OR      REMOVE TONSILS/ADENOIDS,<11 Y/O      T & A <12y.o.     ZZC APPENDECTOMY       ZZC TOTAL HIP ARTHROPLASTY      Hip Replacement,  Total               Social History:     Social History     Tobacco Use     Smoking status: Never     Smokeless tobacco: Never   Substance Use Topics     Alcohol use: Yes     Comment: Have maybe 1 drink  a week             Family History:   Family history was fully reviewed and non-contributory in this case.          Allergies:     Allergies   Allergen Reactions     Nsaids      aleve, ibuprofen     hives and swelling             Medications:     Prior to Admission medications    Medication Sig Last Dose Taking? Auth Provider Long Term End Date   atorvastatin (LIPITOR) 40 MG tablet Take 1 tablet (40 mg) by mouth daily 2/13/2023 Yes Kentrell Urias PA-C Yes    lisinopril (ZESTRIL) 20 MG tablet Take 1 tablet (20 mg) by mouth daily 2/13/2023 Yes Kentrell Urias PA-C Yes    metFORMIN (GLUCOPHAGE XR) 500 MG 24 hr tablet Take 1 tablet (500 mg) by mouth daily (with dinner) 2/13/2023 Yes Kentrell Urias PA-C Yes    MULTI-VITAMIN OR TABS 1 tablet daily Past Week Yes Yfn Baum MD     blood glucose (NO BRAND SPECIFIED) lancets standard Use to test blood sugar 1 times daily or as directed.   Kentrell Urias PA-C     blood glucose monitoring (NO BRAND SPECIFIED) meter device kit Use to test blood sugar 1 times daily or as directed.   Kentrell Urias PA-C No    CONTOUR NEXT TEST test strip TEST DAILY   Kentrell Urias PA-C Yes    EPINEPHrine (ANY BX GENERIC EQUIV) 0.3 MG/0.3ML injection 2-pack Inject 0.3 mLs (0.3 mg) into the muscle once as needed (allergic state, sequela)   Kentrell Urias PA-C         Current hospital administered medication list (MAR) also reviewed.          Review of Systems:     A comprehensive greater than 10 system review of systems was carried out.  Pertinent positives and negatives are noted above.  Otherwise negative for contributory info.            Physical Exam:   Blood pressure 117/56, pulse 75, temperature 99.1  F (37.3  C), temperature  "source Temporal, resp. rate 18, height 1.651 m (5' 5\"), weight 78.9 kg (174 lb), SpO2 99 %.  Exam:  General: Alert, awake, no acute distress.  HEENT: Normocephalic and atraumatic, eyes anicteric and without scleral injection, EOMI, face symmetric, MMM.  Cardiac: RRR, normal S1, S2. No m/g/r, no LE edema.  Pulmonary: Normal chest rise, normal work of breathing.  Lungs CTAB without crackles or wheezing.  Abdomen: soft, non-tender, non-distended.  Normoactive bowel sounds, no guarding or rebound tenderness.  Extremities: no deformities.  Warm, well perfused.  Skin: no rashes or lesions.  Warm and Dry.  Neuro: No focal deficits.  Speech clear.  Spontaneously moving all extremities in bed.  Psych: Alert and oriented x3. Appropriate affect.          Data:   Imaging:  None Reviewed.    EKG/Telemetry:  Pre op ekg reviewed, no tele needed.     Labs: BMP, cbc reviewed.     Clau Casas PA-C  [facility] Hospitalist  February 15, 2023      "

## 2023-02-15 NOTE — OP NOTE
Procedure Date: 02/14/2023    PREOPERATIVE DIAGNOSES:    1.  Severe spinal stenosis.  2.  Multilevel lumbar degenerative disk disease, primarily at L3 to L4, L4 to L5 and L5 to S1.    3.  S1 to S2 vestigial disk.  4   Loss of lordosis and severe up-down foraminal stenosis at L5 to S1 impinging upon bilateral L5 nerve roots.  5.  Mild spondylolisthesis at L3 to L4, L4 to L5.  6.  Chronic claudicatory and radicular bilateral leg symptoms ongoing for up to 4 years despite conservative care.    POSTOPERATIVE DIAGNOSES:    1.  Severe spinal stenosis.  2.  Multilevel lumbar degenerative disk disease, primarily at L3 to L4, L4 to L5 and L5 to S1.    3.  S1 to S2 vestigial disk.  4   Loss of lordosis and severe up-down foraminal stenosis at L5 to S1 impinging upon bilateral L5 nerve roots.  5.  Mild spondylolisthesis at L3 to L4, L4 to L5.  6.  Chronic claudicatory and radicular bilateral leg symptoms ongoing for up to 4 years despite conservative care.    PROCEDURES:     1.  L3 to L4 TLIF.  2.  L4 TO L5 TLIF.  3.  L5 TO S1 TLIF.  4.  L5 to S1 Smith-Lewis osteotomy via bilateral L5 to S1 facetectomy in treatment of loss of L5 to S1 lordosis as well as severe disk space collapse, attempting to provide as much restoration as possible of the foraminal height.  5.  Bony posterior spinal fusion performed separately to the interbody fusion procedure.  This posterior bony fusion spans L3, L4, L4 and S1.  6.  Central laminectomy, L3, L4, L5 and S1.  7.  Application of intervertebral biomechanical device for interbody fusion purposes at all levels fused including, L3 to L4, L4 to L5 and L5 to S1.    8.  Posterior instrumentation using a bilateral pedicle screw and vlad construct spanning from L3 to S1.  9.  Local autograft bone for interbody and posterolateral fusion purposes.  10.  Allograft bone graft extender for posterior fusion and interbody fusion.      SURGEON:  Lam Castellanos MD    ASSISTANT:  Ernesto Huston  PA-C    ANESTHESIA:  General endotracheal anesthesia without complication.    COMPLICATIONS:  None.    DRAINS:  One Hemovac drain placed prior to closure.  This was taken out a separate poke hole and tied to the skin with nylon stitch to prevent backout.    ESTIMATED BLOOD LOSS:  200 mL    FINDINGS:  Full decompression from L3 to S1.  Restoration of disk height.  Stabilization of L3 to S1 without complications.  Counts were correct prior to closure.    INDICATIONS FOR PROCEDURE:  Mr. Zepeda is a 77-year-old male who had [facility] with reports of claudicatory and radicular leg pain that had been recalcitrant to conservative care over the last several years.  He failed to improve with observation, medications, therapies, injections.  Symptoms affected both his legs.  Imaging demonstrated severe spinal stenosis at L3 to L4 and L4 to L5 and he had significant foraminal stenosis at L5 to S1 due to significant disk space collapse at L5 to S1.  Forward flexion x-rays demonstrated trace anterolisthesis of L4 and L5 with an otherwise stable L5 to S1 level.  In order to decompress the central canal, lateral recesses and the bilateral foramen, we discussed the above-mentioned procedure as a possible means to improve or alleviate his leg symptoms.  We discussed risks, benefits and alternatives.  In order to best decompress the L5 bilateral nerve roots and in an attempt to recreate disk space and restore some lordosis loss through the disk space, we had included the Durant-Lewis osteotomy as well.  After understanding all this, he elected to proceed with surgery.  He had been presented with a consent form, which was read, understood and signed.  All questions were answered.  He was referred for preoperative H and P.    DESCRIPTION OF PROCEDURE:  On the date of procedure, he was seen in the preoperative area.  Questions were answered.  Skin was marked.  Consent was signed by both parties.   After finding no contraindications to proceed with surgery in the preoperative anesthesia assessment, he was brought back to the operating room, where he was successfully sedated and an ET tube was placed.  Tucker catheter was placed under sterile conditions.  He was repositioned prone over a 4-poster frame on a Eyal table.  Eyes were free of compression.  SCDs were in place.  Shoulders and elbows were at 90 degrees with the shoulders slightly flexed forward.  The lumbar region was prepped and draped in standard fashion.  Timeout was performed and IV antibiotics were administered.      We began by localizing over the midline at the proposed L4 to L5 level.  The midline was marked and infiltrated with Marcaine mixed with epinephrine.  A midline incision was created with a 10 blade.  I dissected down through the skin and subcutaneous tissues to the lumbar fascia, which was split in the midline.  A subperiosteal dissection was completed.  A Kocher clamp was applied.  Noting that there was an S1 to S2 disk, we then identified our position and continued.  We reentered the wound.  We completed a subperiosteal dissection from L2 to S1.  L2 was exposed for placement of our spinous process clamp and exposure of the L3 transverse processes.    With the exposure, we removed the facet capsules of L3 to L4, L4 to L5  and L5 to S1 bilaterally.  All osteophytes were taken down with Kerrison rongeur.  The bony osteophytes were removed with a Leksell rongeur.  All bone graft removed from the wound was cleaned and placed on the back table and used later in our procedure for bone grafting.    Following exposure, we placed a spinous process clamp on L2 and we completed a spin successfully with the [employer] O-arm.  The information was successfully transferred to the [employer] system.    I reentered the wound at that point.  I then created our  holes for our pedicle screws from L3 to S1 bilaterally with a Stealth-guided drill.  We  then upsized with an appropriate tap.  We measured our screw lengths.  Before placing our Medtronic Solera screws, I decorticated the bilateral gutters for purposes of bony posterior fusion from L3 to S1.  This decortication was through the facet joints, pars interarticularis well as the transverse processes from L3 to S1 onto the sacral ala.  Each gutter was then packed with a 1 x 10 MagniFuse and the bone graft extender and we did later come back in the case to further backfill the gutters to complete our bony posterior fusion.  This completed our posterior fusion.    I then moved on to placement of our instrumentation.  Today, we used Medtronic Solera pedicle screws.  Eight pedicle screws were placed under Stealth guidance with a PowerEase.  Left L3 was 5.5 x 45, the S1 screws were 6.5 x 40 mm.  The remaining screws were 6.5 x 45 mm.  All 8 screws were placed without complication and had a solid fixation.    I then moved on to the decompression process.  A Bella rongeur was used to take down the spinous processes of L3 to S1.  The soft tissues were cleaned a bit more with a Leksell rongeur.  I then switched over to a high-speed shira to begin thinning the lamina.  During this process, which was quite tedious as his bone was thickened and cortical, we did collect all of our bone shavings with a suction bone dust trap.  This took quite some time to decompress all levels.  We aggressively took down the facet joints bilaterally at L5 to S1 in efforts to performing a Durant-Lewis osteotomy and completely decompress the bilateral nerve roots.  This was performed successfully.  Wide decompression was completed from L3 to S1 through the entire surgical bed.  This exposed the ligamentum flavum, which was taken down.  Thorough lateral recess decompressions were completed bilaterally and foraminotomies were completed bilaterally at L3, L4, L5 and S1.  Following the decompression, we then exposed the entire thecal sac  from top to bottom, L3 to S1 widely throughout the branch nerve roots and this was performed without complication.  As mentioned, we did harvest all of our local autograft bone throughout that process.    I then moved on to our TLIF procedures.  Standing from the patient's left side, I accessed the left posterolateral L5 to S1 disk space, which was quite compressed.  We had some distraction pins across the pedicle screws routinely.  I was able to enter the disk space.  I then performed a diskectomy from the left using curettes, pituitaries and camilla.  Trialing was completed.  This finally allowed us to place our Medtronic Catalyft expandable titanium cage at L5 to S1, size 7 x 27 mm.  This was slightly elevated before it tapped out.  The disk space itself was then back filled with a combination of demineralized bone fibers, which had been mixed with the local autograft bone from our decompression process to provide a nice interbody fusion at L5 to S1 with partial restoration of disk height as well as indirect decompression of the L5 nerves and direct decompression of the L5 nerves throughout the foramen due to the Durant-Lewis osteotomy at L5 to S1.    We then reorganized our distractors over the L4 to L5 segment.  Standing from the patient's left side, I performed a diskectomy at L4 to L5 with similar technique using camilla, curettes and pituitaries.  Trialing was completed.  After diskectomy down to the bleeding bony endplates, we then placed our expandable titanium Catalyft Medtronic cage.  The disk was back filled with local autograft/allograft bone as was L5 to S1.  This completed our interbody fusion L4 to L5.    The distractors were then reorganized over L3 to L4 and we went through the exact same process, protecting the neurologic elements, of course, with cottonoids and a D'Errico retractor as we had done at the lower levels.  Diskectomy and trialing was completed to finally allow a 9 x 27 interbody  cage Catalyft, at the L3 to L4 disk space.  This was elevated.  The disk space was back filled with autograft/allograft as we have done at the other 2 levels below.  This completed our level interbody fusion/TLIF procedure.    We then completed our instrumentation by placing two 80 mm lordotic prebent 5.5 mm titanium rods.  Eight end caps were applied and final tightened.  A cross connector was placed at the lower lumbar region in order to protect against nonunion as best as possible.  All endcaps were applied and final tightened.  Final x-rays were obtained in both AP and lateral projection.  Excellent instrumentation.  No complications.  This concluded the great majority of our procedure.  The wound was copiously irrigated and suctioned on several occasions.  A medium Hemovac drain was placed deep to the lumbar fascia, taken out a separate poke hole and tied to the skin with nylon stitch to prevent backout.  The wound was then closed in layers including the lumbar fascia, subcutaneous tissue and skin after we verified that we had all counts correct.  The wound was cleaned and dried.  Dermabond was applied.  Sterile dressings were applied.  Drapes were taken down.  He was then rolled back into supine position on his hospital cart, extubated and brought to the PACU in stable condition.    Ernesto Huston PA-C, was present from start to finish and absolutely necessary.  He was present through entire procedure including positioning, exposure, suctioning, retracting and assisting with placement of instrumentation as well as closure of the wound.      We used [employer] products today.  We used autograft and allograft bone.  We did use a Medtronic O-arm and fluoroscopy, which was necessary.  No complications today.  Counts were correct prior to closure.    Lam Castellanos MD        D: 2023   T: 2023   MT: [id number]    Name:     KING NG  MRN:      [id number]        Account:        [id number]   :       1945           Procedure Date: 02/14/2023     Document: [id number]

## 2023-02-15 NOTE — PLAN OF CARE
Physical Therapy Discharge Summary     Reason for therapy discharge:    All goals and outcomes met, no further needs identified.     Progress towards therapy goal(s). See goals on Care Plan in Epic electronic health record for goal details.  Goals partially met- SBA for mobility but only due to needing very minimal reminders for spinal precautions and family was educated on these prec to assist with pt at home. Will remain SBA for mobility and family able to assist.      Therapy recommendation(s):    Continue home exercise program.

## 2023-02-15 NOTE — PLAN OF CARE
Goal Outcome Evaluation:      Plan of Care Reviewed With: patient, spouse    Overall Patient Progress: improvingOverall Patient Progress: improving       Low grade temps 99.9 and 100 today, vss, sats good on r/a, doing IS every hour. Dressing to his back clean and dry, vac putting out moderate amount of bloody drainage. Tucker in place with good urine output,  OK'd it be left in since he self caths at multiple times a day. Blood sugars 135 and 144 today covered with sliding scale. Moderate pain ratings medicated with po dilaudid, tylenol scheduled and robaxin. Up with assist of 1 and walker with brace on. Hgb 11.7. Spouse in room with pt, will help pt at home on discharge in 1-2 days.

## 2023-02-15 NOTE — PLAN OF CARE
Arrived to room 605 from PACU at 1715 via cart, transferred to bed via hover mat without difficulty, oriented to room and call system.    Patient vital signs are at baseline: Yes  Patient able to ambulate as they were prior to admission or with assist devices provided by therapies during their stay:  No,  Reason:  Pt yet to get OOB.  Patient MUST void prior to discharge:  No,  Reason:  Tucker patent and draining.  Patient able to tolerate oral intake:  Yes-regular diet.  Pain has adequate pain control using Oral analgesics:  Yes-PO dilaudid, robaxin, atarax, and scheduled tylenol.  Does patient have an identified :  Yes-spouse.  Has goal D/C date and time been discussed with patient:  Yes-home in the next 1-2 days-pending therapy.    Pt A&O x4. CMS intact. Dressing CDI. Hemovac patent and draining.

## 2023-02-15 NOTE — PROGRESS NOTES
02/15/23 1445   Appointment Info   Signing Clinician's Name / Credentials (OT) Meredith Moss, OTR/L   Living Environment   People in Home spouse   Current Living Arrangements apartment   Transportation Anticipated family or friend will provide   Living Environment Comments Pt lives in an apartment with elevator access. Will use walk in shower and SHT.   Self-Care   Equipment Currently Used at Home walker, rolling;cane, straight  (has access to commode and maybe a shower chair, owns a reacher and LHS)   Fall history within last six months no   Activity/Exercise/Self-Care Comment I with ADLs/IADls   Instrumental Activities of Daily Living (IADL)   IADL Comments spouse to complete as needed   General Information   Onset of Illness/Injury or Date of Surgery 02/14/23   Referring Physician Dr. Castellanos   Patient/Family Therapy Goal Statement (OT) to return home tomorrow   Additional Occupational Profile Info/Pertinent History of Current Problem Sushant Zepeda is a 77 year old male with a history of Type II DM, HLD, atonic bladder with severe L hydronephrosis, CKD II, HTN, OA, prior DEVIKA, Spinal stenosis, DGD, who was admitted for  TLIF L3-S1 and laminectomies L3-S1.   Existing Precautions/Restrictions brace worn when out of bed;spinal   General Observations and Info patient was in chair, spouse present and agreeable to OT session   Cognitive Status Examination   Orientation Status orientation to person, place and time   Cognitive Status Comments patient fatigued during session, at times eyes close during education   Visual Perception   Visual Impairment/Limitations corrective lenses full-time   Pain Assessment   Patient Currently in Pain Yes, see Vital Sign flowsheet  (rating pain 4/10)   Posture   Posture not impaired   Range of Motion Comprehensive   General Range of Motion bilateral upper extremity ROM WFL   Strength Comprehensive (MMT)   Comment, General Manual Muscle Testing (MMT) Assessment general weakness to be  expected following surgery   Muscle Tone Assessment   Muscle Tone Quick Adds No deficits were identified   Coordination   Upper Extremity Coordination No deficits were identified   Transfers   Transfer Comments SBA, fww sit<>stand   Balance   Balance Comments LOB was not notesd, currently ambulating with fww which is below baseline   Activities of Daily Living   BADL Assessment/Intervention bathing;lower body dressing;upper body dressing;toileting   Bathing Assessment/Intervention   [place] Level (Bathing) minimum assist (75% patient effort)   Upper Body Dressing Assessment/Training   [place] Level (Upper Body Dressing) minimum assist (75% patient effort)   Lower Body Dressing Assessment/Training   [place] Level (Lower Body Dressing) minimum assist (75% patient effort)   Toileting   [place] Level (Toileting) minimum assist (75% patient effort)   Clinical Impression   Criteria for Skilled Therapeutic Interventions Met (OT) Yes, treatment indicated   OT Diagnosis decreased ADL/IADLs   OT Problem List-Impairments impacting ADL activity tolerance impaired;balance;strength;pain;post-surgical precautions   Assessment of Occupational Performance 5 or more Performance Deficits   Identified Performance Deficits dsg, toileting, bathing, functional/community mobility, driving, errands, household chores   Planned Therapy Interventions (OT) ADL retraining;progressive activity/exercise;transfer training   Clinical Decision Making Complexity (OT) low complexity   Anticipated Equipment Needs Upon Discharge (OT) shower chair  (toileting aid)   Risk & Benefits of therapy have been explained evaluation/treatment results reviewed;care plan/treatment goals reviewed;risks/benefits reviewed;patient;spouse/significant other;participants voiced agreement with care plan   OT Total Evaluation Time   OT Eval, Low Complexity Minutes (68469) 8   OT Goals   Therapy Frequency (OT) Daily   OT Predicted Duration/Target Date for  Goal Attainment 02/16/23   OT Goals Upper Body Dressing;Lower Body Dressing;Transfers;Toilet Transfer/Toileting;OT Goal 1   OT: Upper Body Dressing Minimal assist;including orthotic;within precautions   OT: Lower Body Dressing Minimal assist;within precautions   OT: Transfer Supervision/stand-by assist;within precautions;Goal Met;Completed  (walk in shower)   OT: Toilet Transfer/Toileting Supervision/stand-by assist;toilet transfer;using adaptive equipment;within precautions;Goal Met;Completed   OT: Goal 1 Patient will verbalize at least 2-3 adaptations to ADLs routines at home to accommodate energy level and safety needs.- goal met   OT Discharge Planning   OT Plan abhay Sierra   OT Discharge Recommendation (DC Rec) home with assist   OT Rationale for DC Rec Anticipate patient will meet needed goals for safe discharge home with family to A as needed with IADL's; household chores, driving, errands, cooking and bathing. Recommended AE;  toileting aid and shower chair (if unable to borrow one from family), patient has all other needed.   OT Brief overview of current status SBA, fww sit<>stand, functional mobility in [place]. SBa for toilet and walk in shower transfer   Total Session Time   Total Session Time (sum of timed and untimed services) 8

## 2023-02-16 ENCOUNTER — APPOINTMENT (OUTPATIENT)
Dept: OCCUPATIONAL THERAPY | Facility: CLINIC | Age: 78
DRG: 454 | End: 2023-02-16
Attending: ORTHOPAEDIC SURGERY
Payer: MEDICARE

## 2023-02-16 VITALS
WEIGHT: 174 LBS | DIASTOLIC BLOOD PRESSURE: 59 MMHG | RESPIRATION RATE: 16 BRPM | BODY MASS INDEX: 28.99 KG/M2 | TEMPERATURE: 99.4 F | OXYGEN SATURATION: 95 % | HEIGHT: 65 IN | SYSTOLIC BLOOD PRESSURE: 134 MMHG | HEART RATE: 90 BPM

## 2023-02-16 LAB
GLUCOSE BLDC GLUCOMTR-MCNC: 142 MG/DL (ref 70–99)
GLUCOSE BLDC GLUCOMTR-MCNC: 148 MG/DL (ref 70–99)
HGB BLD-MCNC: 11.7 G/DL (ref 13.3–17.7)

## 2023-02-16 PROCEDURE — 36415 COLL VENOUS BLD VENIPUNCTURE: CPT | Performed by: INTERNAL MEDICINE

## 2023-02-16 PROCEDURE — 85018 HEMOGLOBIN: CPT | Performed by: INTERNAL MEDICINE

## 2023-02-16 PROCEDURE — 250N000013 HC RX MED GY IP 250 OP 250 PS 637: Performed by: PHYSICIAN ASSISTANT

## 2023-02-16 PROCEDURE — 250N000013 HC RX MED GY IP 250 OP 250 PS 637

## 2023-02-16 PROCEDURE — 97535 SELF CARE MNGMENT TRAINING: CPT | Mod: GO | Performed by: REHABILITATION PRACTITIONER

## 2023-02-16 RX ORDER — HYDROMORPHONE HYDROCHLORIDE 2 MG/1
2-4 TABLET ORAL EVERY 4 HOURS PRN
Qty: 28 TABLET | Refills: 0
Start: 2023-02-16 | End: 2023-08-24

## 2023-02-16 RX ORDER — AMOXICILLIN 250 MG
1-2 CAPSULE ORAL 2 TIMES DAILY
Qty: 30 TABLET | Refills: 0
Start: 2023-02-16 | End: 2023-08-24

## 2023-02-16 RX ORDER — VITAMIN B COMPLEX
25 TABLET ORAL 2 TIMES DAILY
Qty: 180 TABLET | Refills: 0
Start: 2023-02-16 | End: 2023-08-24

## 2023-02-16 RX ORDER — METHOCARBAMOL 500 MG/1
500 TABLET, FILM COATED ORAL EVERY 6 HOURS PRN
Qty: 30 TABLET | Refills: 0
Start: 2023-02-16 | End: 2023-08-24

## 2023-02-16 RX ADMIN — HYDROMORPHONE HYDROCHLORIDE 4 MG: 2 TABLET ORAL at 09:28

## 2023-02-16 RX ADMIN — SENNOSIDES AND DOCUSATE SODIUM 1 TABLET: 50; 8.6 TABLET ORAL at 09:20

## 2023-02-16 RX ADMIN — LISINOPRIL 20 MG: 20 TABLET ORAL at 09:20

## 2023-02-16 RX ADMIN — HYDROMORPHONE HYDROCHLORIDE 4 MG: 2 TABLET ORAL at 00:43

## 2023-02-16 RX ADMIN — HYDROMORPHONE HYDROCHLORIDE 4 MG: 2 TABLET ORAL at 13:01

## 2023-02-16 RX ADMIN — ACETAMINOPHEN 975 MG: 325 TABLET ORAL at 13:01

## 2023-02-16 RX ADMIN — METHOCARBAMOL 500 MG: 500 TABLET ORAL at 13:01

## 2023-02-16 RX ADMIN — ACETAMINOPHEN 975 MG: 325 TABLET ORAL at 05:56

## 2023-02-16 RX ADMIN — INSULIN ASPART 1 UNITS: 100 INJECTION, SOLUTION INTRAVENOUS; SUBCUTANEOUS at 11:20

## 2023-02-16 RX ADMIN — Medication 1 TABLET: at 12:31

## 2023-02-16 RX ADMIN — Medication 1 TABLET: at 09:19

## 2023-02-16 RX ADMIN — Medication 25 MCG: at 09:19

## 2023-02-16 ASSESSMENT — ACTIVITIES OF DAILY LIVING (ADL)
ADLS_ACUITY_SCORE: 29

## 2023-02-16 NOTE — PLAN OF CARE
Pt Tucker catheter was removed at 0600 per order. Tip of catheter intact. Pt tolerated procedure well. Pt educated to notify nursing staff when he void. Will continue to monitor and assess pt.

## 2023-02-16 NOTE — DISCHARGE INSTRUCTIONS
Incision Instructions     Your incision is covered with an Aquacel dressing. This is a waterproof dressing that you are able to shower with. Do not submerge your dressing in water. You should remove your dressing 7 days following your surgery to inspect your incision for infection: Redness, warmth, drainage.     However, if you notice a significant amount of drainage on your dressing, or there is any concern for possible incision infection, remove to inspect the incision prior to the 7 days.    If there is no concern for infection, cover with simple dressing. We suggest a folded piece of gauze with a few pieces of tape to hold in place. This will allow the incision to remain protected. Please make sure to cover your dressing with plastic/waterproof dressing to keep it waterproof while in the shower. We ask that you continue to waterproof it until you are seen at your two week post op appointment.     Activity Instructions   Minimize bending, lifting, twisting. No lifting greater than 10 lbs. Remember, 1 gallon of milk is 8 lbs.    Please call our office at [phone] should you develop the following issues:  1.) Increased/persistent redness, bleeding, localized warmth, increased swelling, and/or drainage (yellow/clear/odorous) incision site.   2.) Increased pain not controlled with oral pain medications   3.) Persistent headache, dizziness, lightheaded  4.) Persistent constipation despite taking OTC stool softeners as directed  5.) Calf pain/swollen/hard/warm area, swelling chest pain or shortness of breath  6.) Increased/persistent numbness or tingling in arm or legs, weakness in extremities or falls  7.) Generalized feelings of illness  8.) Persistent fever, chills, sweats, Temp 101 or greater  9.) Trouble voiding, incontinence of bowel and/or bladder  10.) Too sleepy-could be amount of pain medication  11.) If unable to wake call 911    Other instructions:  1.) No heavy lifting, nothing more than 6-10lbs.  Minimize your bending, lifting, and twisting. Attempt to avoid prolonged period of sitting. Follow physical therapy restrictions and exercises - slowly increase your activity.   2.) Avoid sitting or laying in one position too long, walk as tolerated, log roll  3.) Wear brace when up per physical therapy, inspect skin under brace daily and call md if sore area starts  4.) Take an over the counter stool softener as directed while on narcotics to prevent constipation or to stay regular. Take a suppository or laxative if no bowel movement in 2 days despite taking softener     Follow Up   Follow up with Ernesto Huston PA-C in two weeks at [facility]. Please call Elliott [phone] to schedule.

## 2023-02-16 NOTE — PLAN OF CARE
Goal Outcome Evaluation:  Vital signs stable.  Lungs clear, encouraged inspirometer use.  Dressing intact.  CMS intact, wears brace when up and transfers with walker, gait belt and 1 assist.  Hgb 11.7.  Pain controlled with tylenol, robaxin, po dilaudid.  Tucker patent, catheter care done.Tucker to be discontinued in am.      Plan of Care Reviewed With: patient

## 2023-02-16 NOTE — PLAN OF CARE
Patient vital signs are at baseline: Yes  Patient able to ambulate as they were prior to admission or with assist devices provided by therapies during their stay:  Yes  Patient MUST void prior to discharge:  Yes  Patient able to tolerate oral intake:  Yes  Pain has adequate pain control using Oral analgesics:  Yes  Does patient have an identified :  Yes  Has goal D/C date and time been discussed with patient:  Yes    Pt is alert and oriented x4. Pt was given scheduled Tylenol and PRN Dilaudid for pain. Pt surgical dressing is clean dry and intact. CMS intact. Pt Tucker catheter was removed during the shift. No acute distress noted. Pt is sitting in his chair and chair alarm in place. Will continue and assess pt.

## 2023-02-27 NOTE — DISCHARGE SUMMARY
[facility] Discharge Summary    Sushant Zepeda MRN# [id number]   Age: 77 year old YOB: 1945     Date of Admission:  2/14/2023  Date of Discharge::  2/16/2023  1:14 PM  Admitting Physician:  Lam Castellanos MD  Discharge Physician:  Ernesto Huston, PA-C     Home clinic: [facility]           Admission Diagnoses:   Lumbar spinal stenosis [M48.061]  Neurogenic claudication (H) [G95.19]  Spondylolisthesis [M43.10]  Degenerative disc disease, lumbar [M51.36]  Spinal instability [M53.2X9]  S/P lumbar fusion [Z98.1]          Discharge Diagnosis:   Patient Active Problem List    Diagnosis     S/P lumbar fusion     Left anterior fascicular block     Strain of right gastrocnemius muscle     Strain of gastrocnemius muscle of left lower extremity     Chronic bilateral low back pain without sciatica     Essential tremor     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             Discharge Medications:     Discharge Medication List as of 2/16/2023 12:48 PM      START taking these medications    Details   calcium carbonate-vitamin D (OSCAL) 500-5 MG-MCG tablet Take 1 tablet by mouth 3 times daily (before meals), Disp-270 tablet, R-0, No Print Out      HYDROmorphone (DILAUDID) 2 MG tablet Take 1-2 tablets (2-4 mg) by mouth every 4 hours as needed for moderate to severe pain, Disp-28 tablet, R-0, No Print Out      methocarbamol (ROBAXIN) 500 MG tablet  Take 1 tablet (500 mg) by mouth every 6 hours as needed for muscle spasms, Disp-30 tablet, R-0, No Print Out      senna-docusate (SENOKOT-S/PERICOLACE) 8.6-50 MG tablet Take 1-2 tablets by mouth 2 times daily Take while on oral narcotics to prevent or treat constipation., Disp-30 tablet, R-0, No Print OutWhile taking narcotics      Vitamin D3 (CHOLECALCIFEROL) 25 mcg (1000 units) tablet Take 1 tablet (25 mcg) by mouth 2 times daily, Disp-180 tablet, R-0, No Print Out         CONTINUE these medications which have NOT CHANGED    Details   atorvastatin (LIPITOR) 40 MG tablet Take 1 tablet (40 mg) by mouth daily, Disp-90 tablet, R-3, E-Prescribe      blood glucose (NO BRAND SPECIFIED) lancets standard Use to test blood sugar 1 times daily or as directed.Disp-100 lancet, [id number] per pt insurance per pt preference      blood glucose monitoring (NO BRAND SPECIFIED) meter device kit Use to test blood sugar 1 times daily or as directed.Disp-1 kit, [id number] per pt insurance per pt preference      CONTOUR NEXT TEST test strip TEST DAILY, Disp-100 strip, R-1, E-Prescribe      EPINEPHrine (ANY BX GENERIC EQUIV) 0.3 MG/0.3ML injection 2-pack Inject 0.3 mLs (0.3 mg) into the muscle once as needed (allergic state, sequela), Disp-0.6 mL, R-3, E-Prescribe      lisinopril (ZESTRIL) 20 MG tablet Take 1 tablet (20 mg) by mouth daily, Disp-90 tablet, R-3, E-Prescribe      metFORMIN (GLUCOPHAGE XR) 500 MG 24 hr tablet Take 1 tablet (500 mg) by mouth daily (with dinner), Disp-90 tablet, R-1, E-Prescribe      MULTI-VITAMIN OR TABS 1 tablet daily, Oral, Historical                   Consultations:   PT, OT, Hospitalist                 Hospital Course:   The patient's hospital course was unremarkable.  He recovered as anticipated and experienced no post-operative complications.           Discharge Instructions and Follow-Up:   Discharge diet: Regular   Discharge activity: Activity as tolerated  Minimize bending,  lifting, twisting. No lifting greater than 10 lbs. Remember, 1 gallon of milk is 8 lbs.     Discharge follow-up: Follow up with Ernesto Huston PA-C in two weeks at [facility]. Please call Elliott [phone] to schedule.      Wound care: Your incision is covered with an Aquacel dressing. This is a waterproof dressing that you are able to shower with. Do not submerge your dressing in water. Do not remove dressing until you are seen in clinic for your two week post op visit.     However, if you notice a significant amount of drainage on your dressing, or there is any concern for possible incision infection, remove to inspect the incision.    If you do remove the dressing prior to your two week visit, please cover with simple dressing. We suggest a folded piece of gauze with a few pieces of tape to hold in place. This will allow the incision to remain protected. Please make sure to cover your dressing with plastic/waterproof dressing to keep it waterproof if you have removed the initial dressing while in the shower. We ask that you continue to waterproof it until you are seen at your two week post op appointment.              Discharge Disposition:   Discharged to home      Attestation:  I have reviewed today's vital signs, notes, medications, labs and imaging.    Ernesto Huston PA-C

## 2023-02-28 ENCOUNTER — NURSE TRIAGE (OUTPATIENT)
Dept: FAMILY MEDICINE | Facility: CLINIC | Age: 78
End: 2023-02-28
Payer: MEDICARE

## 2023-02-28 NOTE — TELEPHONE ENCOUNTER
Patient calling and states he had back surgery a couple of weeks ago.  Wondering if he has bladder infection.  Urine is cloudy since getting out of hospital and gradually getting worse.  No other symptoms.  Patient also self caths 4-5 times a day.  Scheduled tomorrow at 10:10 am.  Marcia Espinoza RN

## 2023-03-01 ENCOUNTER — OFFICE VISIT (OUTPATIENT)
Dept: FAMILY MEDICINE | Facility: CLINIC | Age: 78
End: 2023-03-01
Payer: MEDICARE

## 2023-03-01 ENCOUNTER — TELEPHONE (OUTPATIENT)
Dept: FAMILY MEDICINE | Facility: CLINIC | Age: 78
End: 2023-03-01

## 2023-03-01 VITALS
DIASTOLIC BLOOD PRESSURE: 64 MMHG | TEMPERATURE: 98.2 F | RESPIRATION RATE: 16 BRPM | BODY MASS INDEX: 29.66 KG/M2 | WEIGHT: 178 LBS | OXYGEN SATURATION: 97 % | HEART RATE: 87 BPM | HEIGHT: 65 IN | SYSTOLIC BLOOD PRESSURE: 128 MMHG

## 2023-03-01 DIAGNOSIS — N18.2 SECONDARY DIABETES MELLITUS WITH STAGE 2 CHRONIC KIDNEY DISEASE (H): ICD-10-CM

## 2023-03-01 DIAGNOSIS — M79.662 BILATERAL CALF PAIN: ICD-10-CM

## 2023-03-01 DIAGNOSIS — N39.0 URINARY TRACT INFECTION ASSOCIATED WITH CATHETERIZATION OF URINARY TRACT, UNSPECIFIED INDWELLING URINARY CATHETER TYPE, INITIAL ENCOUNTER (H): Primary | ICD-10-CM

## 2023-03-01 DIAGNOSIS — E13.22 SECONDARY DIABETES MELLITUS WITH STAGE 2 CHRONIC KIDNEY DISEASE (H): ICD-10-CM

## 2023-03-01 DIAGNOSIS — M79.661 BILATERAL CALF PAIN: ICD-10-CM

## 2023-03-01 DIAGNOSIS — T83.511A URINARY TRACT INFECTION ASSOCIATED WITH CATHETERIZATION OF URINARY TRACT, UNSPECIFIED INDWELLING URINARY CATHETER TYPE, INITIAL ENCOUNTER (H): Primary | ICD-10-CM

## 2023-03-01 LAB
ALBUMIN UR-MCNC: NEGATIVE MG/DL
APPEARANCE UR: ABNORMAL
BACTERIA #/AREA URNS HPF: ABNORMAL /HPF
BILIRUB UR QL STRIP: NEGATIVE
COLOR UR AUTO: YELLOW
CREAT UR-MCNC: 155 MG/DL
GLUCOSE UR STRIP-MCNC: NEGATIVE MG/DL
HGB UR QL STRIP: ABNORMAL
KETONES UR STRIP-MCNC: NEGATIVE MG/DL
LEUKOCYTE ESTERASE UR QL STRIP: ABNORMAL
MICROALBUMIN UR-MCNC: 36.6 MG/L
MICROALBUMIN/CREAT UR: 23.61 MG/G CR (ref 0–17)
NITRATE UR QL: NEGATIVE
PH UR STRIP: 5.5 [PH] (ref 5–7)
RBC #/AREA URNS AUTO: ABNORMAL /HPF
SP GR UR STRIP: 1.02 (ref 1–1.03)
SQUAMOUS #/AREA URNS AUTO: ABNORMAL /LPF
UROBILINOGEN UR STRIP-ACNC: 0.2 E.U./DL
WBC #/AREA URNS AUTO: ABNORMAL /HPF
WBC CLUMPS #/AREA URNS HPF: PRESENT /HPF

## 2023-03-01 PROCEDURE — 99214 OFFICE O/P EST MOD 30 MIN: CPT | Performed by: PHYSICIAN ASSISTANT

## 2023-03-01 PROCEDURE — 87086 URINE CULTURE/COLONY COUNT: CPT | Performed by: PHYSICIAN ASSISTANT

## 2023-03-01 PROCEDURE — 87088 URINE BACTERIA CULTURE: CPT | Performed by: PHYSICIAN ASSISTANT

## 2023-03-01 PROCEDURE — 87186 SC STD MICRODIL/AGAR DIL: CPT | Performed by: PHYSICIAN ASSISTANT

## 2023-03-01 PROCEDURE — 82570 ASSAY OF URINE CREATININE: CPT | Performed by: PHYSICIAN ASSISTANT

## 2023-03-01 PROCEDURE — 81001 URINALYSIS AUTO W/SCOPE: CPT | Performed by: PHYSICIAN ASSISTANT

## 2023-03-01 PROCEDURE — 82043 UR ALBUMIN QUANTITATIVE: CPT | Performed by: PHYSICIAN ASSISTANT

## 2023-03-01 RX ORDER — CIPROFLOXACIN 500 MG/1
500 TABLET, FILM COATED ORAL 2 TIMES DAILY
Qty: 14 TABLET | Refills: 0 | Status: SHIPPED | OUTPATIENT
Start: 2023-03-01 | End: 2023-08-24

## 2023-03-01 NOTE — TELEPHONE ENCOUNTER
Called patient.  He did see message.  He did get antibiotic and has already taken first dose.  Marcia Espinoza, RN    Please also call patient.      Dwight Canchola,     Your urine does show infection. The ciprofloxacin twice daily for 1 week we discussed with sent to your pharmacy. If you are not seeing improvement in 3 days, let me know. However, if your fever returns or you start developing significant abdominal pain or new/worsening back pain, I would recommend going to the ER. This could be a sign of a new kidney infection which could be dangerous. However, this is what the antibiotic is for to prevent this.      -daphne barton, pac   Written by Kentrell Barton PA-C on 3/1/2023 11:56 AM CST  Seen by patient Sushant Zepeda on 3/1/2023 12:55 PM

## 2023-03-01 NOTE — TELEPHONE ENCOUNTER
----- Message from Kentrell Urias PA-C sent at 3/1/2023 11:56 AM CST -----  Please also call patient.     Dwight Canchola,    Your urine does show infection. The ciprofloxacin twice daily for 1 week we discussed with sent to your pharmacy. If you are not seeing improvement in 3 days, let me know. However, if your fever returns or you start developing significant abdominal pain or new/worsening back pain, I would recommend going to the ER. This could be a sign of a new kidney infection which could be dangerous. However, this is what the antibiotic is for to prevent this.     -daphne urias, pac

## 2023-03-01 NOTE — PROGRESS NOTES
Assessment & Plan     Urinary tract infection associated with catheterization of urinary tract, unspecified indwelling urinary catheter type, initial encounter (H)  Present on UA catheter associated. Given this, complicated UTI treatment recommended. cipro 500 mg bid for 7 days sent. Afebrile. If any fever develops, ER visit recommended. If no improvement in 3 days, to call and repeat UA to be considered. Otherwise, follow up prn.   - UA Macro with Reflex to Micro and Culture - lab collect; Future  - UA Macro with Reflex to Micro and Culture - lab collect  - Urine Microscopic Exam  - Urine Culture  - ciprofloxacin (CIPRO) 500 MG tablet; Take 1 tablet (500 mg) by mouth 2 times daily  Medication use and side effects discussed with the patient. Patient is in complete understanding and agreement with plan.     Bilateral calf pain  Reported. Ongoing and chronic. Considered dvt given recent surgery. Well's of -1. Given past history, less likely. However, if any worsening symptoms or swelling develops, will need urgent/emergent evaluation.     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)  Due  - Albumin Random Urine Quantitative with Creat Ratio; Future  - Albumin Random Urine Quantitative with Creat R      No follow-ups on file.    SYLVIA [facility]    Subjective   Sushant Zepeda is a 77 year old, presenting for the following health issues:    History of Present Illness       Reason for visit:  Bladder infection  Symptom onset:  3-7 days ago  Symptoms include:  Cloudy urine  Symptom intensity:  Moderate  Symptom progression:  Worsening  Had these symptoms before:  Yes  Has tried/received treatment for these symptoms:  Yes  Previous treatment was successful:  Yes  Prior treatment description:  Antibiotic  What makes it worse:  No  What makes it better:  No    He eats 2-3 servings of fruits and vegetables daily.He consumes 0 sweetened beverage(s)  "daily.He exercises with enough effort to increase his heart rate 9 or less minutes per day.  He exercises with enough effort to increase his heart rate 3 or less days per week.   He is taking medications regularly.     Genitourinary - Male  Onset/Duration: 2 WEEKS AGO  Description:   Dysuria (painful urination): No}  Hematuria (blood in urine): No  Frequency: No  Waking at night to urinate: couple times  Hesitancy (delay in urine): YES  Retention (unable to empty): No  Decrease in urinary flow: No  Incontinence: No  Progression of Symptoms:  constant  Accompanying Signs & Symptoms:  Fever: YES- last week-none for 1 week. Was 101.   Back/Flank pain: had back surgery a couple weeks ago and feels like that is when this all started. Currently uses straight cath to urinate  Urethral discharge: No  Testicle lumps/masses/pain: No  Nausea and/or vomiting: No  Abdominal pain: No  History:   History of frequent UTI s: Has not had UTI for 4 years  History of kidney stones: YES  History of hernias: No  Personal or Family history of Prostate problems: No  Sexually active: N/A  Precipitating or alleviating factors: None  Therapies tried and outcome: increase fluid intake      States ongoing cramping in bilateral calves for years. 1 occurrence since his surgery (lumbar fusion on 2/14/23) but calves are still sore and admits to swollen feet since surgery. No chest pains or shortness of breath. No cough.         Review of Systems   Constitutional, HEENT, cardiovascular, pulmonary, GI, , musculoskeletal, neuro, skin, endocrine and psych systems are negative, except as otherwise noted.      Objective    /64 (BP Location: Left arm, Patient Position: Sitting, Cuff Size: Adult Regular)   Pulse 87   Temp 98.2  F (36.8  C) (Oral)   Resp 16   Ht 1.651 m (5' 5\")   Wt 80.7 kg (178 lb)   SpO2 97%   BMI 29.62 kg/m    Body mass index is 29.62 kg/m .  Physical Exam   GENERAL: alert and no distress  RESP: lungs clear to auscultation " - no rales, rhonchi or wheezes  CV: regular rates and rhythm, normal S1 S2, no S3 or S4 and no murmur, click or rub  ABDOMEN: soft, nontender, no hepatosplenomegaly, no masses and bowel sounds normal  MS: back brace present and ambulating with cane. Bilateral calf without tenderness to palpation. 37 cm bilaterally. No edema or erythema.   PSYCH: mentation appears normal, affect normal/bright    Results for orders placed or performed in visit on 03/01/23 (from the past 24 hour(s))   UA Macro with Reflex to Micro and Culture - lab collect    Specimen: Urine, Clean Catch   Result Value Ref Range    Color Urine Yellow Colorless, Straw, Light Yellow, Yellow    Appearance Urine Cloudy (A) Clear    Glucose Urine Negative Negative mg/dL    Bilirubin Urine Negative Negative    Ketones Urine Negative Negative mg/dL    Specific Gravity Urine 1.020 1.003 - 1.035    Blood Urine Trace (A) Negative    pH Urine 5.5 5.0 - 7.0    Protein Albumin Urine Negative Negative mg/dL    Urobilinogen Urine 0.2 0.2, 1.0 E.U./dL    Nitrite Urine Negative Negative    Leukocyte Esterase Urine Moderate (A) Negative   Urine Microscopic Exam   Result Value Ref Range    Bacteria Urine Many (A) None Seen /HPF    RBC Urine None Seen 0-2 /HPF /HPF    WBC Urine  (A) 0-5 /HPF /HPF    Squamous Epithelials Urine None Seen None Seen /LPF    WBC Clumps Urine Present (A) None Seen /HPF

## 2023-03-03 ENCOUNTER — TRANSFERRED RECORDS (OUTPATIENT)
Dept: HEALTH INFORMATION MANAGEMENT | Facility: CLINIC | Age: 78
End: 2023-03-03
Payer: MEDICARE

## 2023-03-03 LAB — BACTERIA UR CULT: ABNORMAL

## 2023-03-22 ENCOUNTER — TELEPHONE (OUTPATIENT)
Dept: UROLOGY | Facility: CLINIC | Age: 78
End: 2023-03-22

## 2023-03-22 ENCOUNTER — LAB (OUTPATIENT)
Dept: LAB | Facility: CLINIC | Age: 78
End: 2023-03-22
Payer: MEDICARE

## 2023-03-22 DIAGNOSIS — R33.9 URINARY RETENTION: Primary | ICD-10-CM

## 2023-03-22 DIAGNOSIS — R39.89 SUSPECTED UTI: ICD-10-CM

## 2023-03-22 LAB
ALBUMIN UR-MCNC: NEGATIVE MG/DL
APPEARANCE UR: ABNORMAL
BILIRUB UR QL STRIP: NEGATIVE
COLOR UR AUTO: YELLOW
GLUCOSE UR STRIP-MCNC: NEGATIVE MG/DL
HGB UR QL STRIP: ABNORMAL
KETONES UR STRIP-MCNC: NEGATIVE MG/DL
LEUKOCYTE ESTERASE UR QL STRIP: ABNORMAL
NITRATE UR QL: NEGATIVE
PH UR STRIP: 6.5 [PH] (ref 5–7)
SP GR UR STRIP: 1.01 (ref 1–1.03)
UROBILINOGEN UR STRIP-ACNC: 0.2 E.U./DL

## 2023-03-22 PROCEDURE — 87186 SC STD MICRODIL/AGAR DIL: CPT

## 2023-03-22 PROCEDURE — 87088 URINE BACTERIA CULTURE: CPT

## 2023-03-22 PROCEDURE — 81003 URINALYSIS AUTO W/O SCOPE: CPT | Mod: QW

## 2023-03-22 PROCEDURE — 87086 URINE CULTURE/COLONY COUNT: CPT

## 2023-03-22 NOTE — TELEPHONE ENCOUNTER
Returned phone call to patient and spoke with patient. He is not having any other UTI symptoms other than cloudy urine. However, he had a recent UTI four weeks ago and thinks that may still be there. Patient is agreeable to leave a UAUC at a [place] lab. He is aware that results will come to us and we will call him if results are positive in 2-3 days.     China Tom LPN

## 2023-03-22 NOTE — TELEPHONE ENCOUNTER
[facility] Call Center    Phone Message    May a detailed message be left on voicemail: yes     Reason for Call: Other: ,  Pt calling stating he thinks he has a bladder infection, pt says urine is cloudy and is wanting to speak with a care team member regarding this. Please call pt     Action Taken: Message routed to:  Other: Uro    Travel Screening: Not Applicable

## 2023-03-24 NOTE — TELEPHONE ENCOUNTER
Patient is looking to get his UA and culture results from 3/22/23 before the weekend. Please reach out to him. Thank you.

## 2023-03-25 LAB — BACTERIA UR CULT: ABNORMAL

## 2023-03-27 RX ORDER — CIPROFLOXACIN 500 MG/1
500 TABLET, FILM COATED ORAL 2 TIMES DAILY
Qty: 28 TABLET | Refills: 0 | Status: SHIPPED | OUTPATIENT
Start: 2023-03-27 | End: 2023-04-10

## 2023-05-30 ENCOUNTER — LAB (OUTPATIENT)
Dept: LAB | Facility: CLINIC | Age: 78
End: 2023-05-30
Payer: MEDICARE

## 2023-05-30 DIAGNOSIS — R39.89 SUSPECTED UTI: ICD-10-CM

## 2023-05-30 LAB
ALBUMIN UR-MCNC: >=300 MG/DL
APPEARANCE UR: ABNORMAL
BILIRUB UR QL STRIP: NEGATIVE
COLOR UR AUTO: YELLOW
GLUCOSE UR STRIP-MCNC: 500 MG/DL
HGB UR QL STRIP: ABNORMAL
KETONES UR STRIP-MCNC: ABNORMAL MG/DL
LEUKOCYTE ESTERASE UR QL STRIP: ABNORMAL
NITRATE UR QL: NEGATIVE
PH UR STRIP: 6.5 [PH] (ref 5–7)
SP GR UR STRIP: 1.02 (ref 1–1.03)
UROBILINOGEN UR STRIP-ACNC: 0.2 E.U./DL

## 2023-05-30 PROCEDURE — 81003 URINALYSIS AUTO W/O SCOPE: CPT | Mod: QW

## 2023-05-30 PROCEDURE — 87086 URINE CULTURE/COLONY COUNT: CPT

## 2023-05-31 LAB — BACTERIA UR CULT: NORMAL

## 2023-06-01 ENCOUNTER — HEALTH MAINTENANCE LETTER (OUTPATIENT)
Age: 78
End: 2023-06-01

## 2023-06-02 ENCOUNTER — VIRTUAL VISIT (OUTPATIENT)
Dept: INTERNAL MEDICINE | Facility: CLINIC | Age: 78
End: 2023-06-02
Payer: MEDICARE

## 2023-06-02 DIAGNOSIS — R39.15 URINARY URGENCY: Primary | ICD-10-CM

## 2023-06-02 PROCEDURE — 99442 PR PHYSICIAN TELEPHONE EVALUATION 11-20 MIN: CPT | Mod: 95 | Performed by: INTERNAL MEDICINE

## 2023-06-02 NOTE — PROGRESS NOTES
"Sushant Zepeda is a 78 year old who is being evaluated via a billable telephone visit.      What phone number would you like to be contacted at?   How would you like to obtain your AVS? Mail a copy  Distant Location (provider location):  Off-site    Assessment & Plan   Urinary urgency  Reviewed recent urologist notes with patient. Discussed hyperglycemia can cause polyuria though I doubt BG in 150s would cause significant polyuria. Offered to rx urology's suggested course of cipro x10 days but after discussion Sushatn said he actually doesn't think this is a UTI either and preferred to just monitor over the weekend. Discussed that if symptoms worsen, recommend in-person evaluation with any available provider. He was in agreement.    F/u with regular PCP at regular interval or sooner PRN    Charles Wells MD  [facility] [place]    Subjective   Sushant Zepeda is a 78 year old who presents for a same day acute care virtual telephone visit with chief concern of:  Urinary Problem  This is the first time I have met Sushant Zepeda.    History of Present Illness       Reason for visit:  Bladder problem    He eats 0-1 servings of fruits and vegetables daily.He consumes 0 sweetened beverage(s) daily.He exercises with enough effort to increase his heart rate 9 or less minutes per day.  He exercises with enough effort to increase his heart rate 3 or less days per week.   He is taking medications regularly.     Per note in patient's Epic chart written 2 days ago from patient's urologist Dr. Mo \"If he is symptomatic can start him on ciprofloxacin 500 mg twice a day x10 days. Possible he has a resistant organism so might need to switch when final cultures return\"    A result note from 5/30/23 U/A written later on 5/31/23 by Dr. Mo said \"Your urine culture only showed a small amount of normal urogenital myke and did not definitively have a urinary tract infection (you will always have some bacteria in " "your bladder especially since you are catheterizing). If you are having symptoms of infection you can take the antibiotics but I would not take the antibiotics unless you have having significant pain with catheterization, blood in the urine, or especially having systemic symptoms are weakness or fever     Please let us know if you have any questions or concerns.\"    Patient reports fever on Wednesday, none since then. Minimal pain with cathing. No blood in his urine that he's noted. He's noted an increase in the amount of times he's feeling like he needs to cath. He put out 2500cc of urine yesterday.  BG at home the last few days have been 150s.    Review of Systems   Constitutional, gu systems are negative, except as otherwise noted.      Objective    Vitals - Patient Reported  Weight (Patient Reported): 73.9 kg (163 lb)  Temperature (Patient Reported): 97.9  F (36.6  C)  Vitals: No vitals were obtained today due to virtual visit.    Physical Exam   healthy, alert and no distress  PSYCH: Alert and oriented times 3; coherent speech, normal rate and volume, able to articulate logical thoughts, able to abstract reason, no tangential thoughts, no hallucinations or delusions. His affect is normal  RESP: No cough, no audible wheezing, able to talk in full sentences  Remainder of exam unable to be completed due to telephone visits    Phone call duration: 11 minutes  "

## 2023-07-13 DIAGNOSIS — T78.40XS ALLERGY, SEQUELA: ICD-10-CM

## 2023-07-18 RX ORDER — EPINEPHRINE 0.3 MG/.3ML
INJECTION SUBCUTANEOUS
Qty: 2 EACH | Refills: 0 | Status: SHIPPED | OUTPATIENT
Start: 2023-07-18

## 2023-08-14 ENCOUNTER — TRANSFERRED RECORDS (OUTPATIENT)
Dept: HEALTH INFORMATION MANAGEMENT | Facility: CLINIC | Age: 78
End: 2023-08-14
Payer: MEDICARE

## 2023-08-20 DIAGNOSIS — E11.9 TYPE 2 DIABETES MELLITUS WITHOUT COMPLICATION, WITHOUT LONG-TERM CURRENT USE OF INSULIN (H): ICD-10-CM

## 2023-08-21 SDOH — ECONOMIC STABILITY: FOOD INSECURITY: WITHIN THE PAST 12 MONTHS, THE FOOD YOU BOUGHT JUST DIDN'T LAST AND YOU DIDN'T HAVE MONEY TO GET MORE.: NEVER TRUE

## 2023-08-21 SDOH — ECONOMIC STABILITY: INCOME INSECURITY: HOW HARD IS IT FOR YOU TO PAY FOR THE VERY BASICS LIKE FOOD, HOUSING, MEDICAL CARE, AND HEATING?: NOT HARD AT ALL

## 2023-08-21 SDOH — HEALTH STABILITY: PHYSICAL HEALTH: ON AVERAGE, HOW MANY DAYS PER WEEK DO YOU ENGAGE IN MODERATE TO STRENUOUS EXERCISE (LIKE A BRISK WALK)?: 0 DAYS

## 2023-08-21 SDOH — HEALTH STABILITY: PHYSICAL HEALTH: ON AVERAGE, HOW MANY MINUTES DO YOU ENGAGE IN EXERCISE AT THIS LEVEL?: 0 MIN

## 2023-08-21 SDOH — ECONOMIC STABILITY: INCOME INSECURITY: IN THE LAST 12 MONTHS, WAS THERE A TIME WHEN YOU WERE NOT ABLE TO PAY THE MORTGAGE OR RENT ON TIME?: NO

## 2023-08-21 SDOH — ECONOMIC STABILITY: FOOD INSECURITY: WITHIN THE PAST 12 MONTHS, YOU WORRIED THAT YOUR FOOD WOULD RUN OUT BEFORE YOU GOT MONEY TO BUY MORE.: NEVER TRUE

## 2023-08-21 ASSESSMENT — ACTIVITIES OF DAILY LIVING (ADL): CURRENT_FUNCTION: NO ASSISTANCE NEEDED

## 2023-08-21 ASSESSMENT — ENCOUNTER SYMPTOMS
ABDOMINAL PAIN: 0
CONSTIPATION: 0
WEAKNESS: 0
PALPITATIONS: 0
NERVOUS/ANXIOUS: 0
CHILLS: 0
DIARRHEA: 0
ARTHRALGIAS: 1
SORE THROAT: 0
EYE PAIN: 0
HEARTBURN: 0
PARESTHESIAS: 0
DIZZINESS: 0
COUGH: 0
FEVER: 0
FREQUENCY: 0
MYALGIAS: 0
DYSURIA: 0
SHORTNESS OF BREATH: 0
HEMATURIA: 0
HEMATOCHEZIA: 0
HEADACHES: 0
NAUSEA: 0
JOINT SWELLING: 0

## 2023-08-21 ASSESSMENT — LIFESTYLE VARIABLES
HOW OFTEN DO YOU HAVE A DRINK CONTAINING ALCOHOL: 2-4 TIMES A MONTH
HOW OFTEN DO YOU HAVE SIX OR MORE DRINKS ON ONE OCCASION: NEVER
HOW MANY STANDARD DRINKS CONTAINING ALCOHOL DO YOU HAVE ON A TYPICAL DAY: 1 OR 2
SKIP TO QUESTIONS 9-10: 1
AUDIT-C TOTAL SCORE: 2

## 2023-08-21 ASSESSMENT — SOCIAL DETERMINANTS OF HEALTH (SDOH)
DO YOU BELONG TO ANY CLUBS OR ORGANIZATIONS SUCH AS CHURCH GROUPS UNIONS, FRATERNAL OR ATHLETIC GROUPS, OR SCHOOL GROUPS?: NO
HOW OFTEN DO YOU GET TOGETHER WITH FRIENDS OR RELATIVES?: MORE THAN THREE TIMES A WEEK
HOW OFTEN DO YOU ATTEND CHURCH OR RELIGIOUS SERVICES?: NEVER
IN A TYPICAL WEEK, HOW MANY TIMES DO YOU TALK ON THE PHONE WITH FAMILY, FRIENDS, OR NEIGHBORS?: MORE THAN THREE TIMES A WEEK

## 2023-08-22 DIAGNOSIS — E11.9 TYPE 2 DIABETES, HBA1C GOAL < 8% (H): ICD-10-CM

## 2023-08-22 RX ORDER — METFORMIN HCL 500 MG
500 TABLET, EXTENDED RELEASE 24 HR ORAL
Qty: 90 TABLET | Refills: 0 | Status: SHIPPED | OUTPATIENT
Start: 2023-08-22 | End: 2023-08-24

## 2023-08-24 ENCOUNTER — OFFICE VISIT (OUTPATIENT)
Dept: FAMILY MEDICINE | Facility: CLINIC | Age: 78
End: 2023-08-24
Payer: MEDICARE

## 2023-08-24 VITALS
RESPIRATION RATE: 14 BRPM | HEIGHT: 65 IN | DIASTOLIC BLOOD PRESSURE: 66 MMHG | SYSTOLIC BLOOD PRESSURE: 130 MMHG | BODY MASS INDEX: 28.27 KG/M2 | HEART RATE: 67 BPM | TEMPERATURE: 97.4 F | WEIGHT: 169.7 LBS | OXYGEN SATURATION: 97 %

## 2023-08-24 DIAGNOSIS — E11.9 TYPE 2 DIABETES MELLITUS WITHOUT COMPLICATION, WITHOUT LONG-TERM CURRENT USE OF INSULIN (H): ICD-10-CM

## 2023-08-24 DIAGNOSIS — E78.5 HYPERLIPIDEMIA LDL GOAL <100: ICD-10-CM

## 2023-08-24 DIAGNOSIS — Z00.00 ENCOUNTER FOR MEDICARE ANNUAL WELLNESS EXAM: Primary | ICD-10-CM

## 2023-08-24 DIAGNOSIS — I10 ESSENTIAL HYPERTENSION, BENIGN: ICD-10-CM

## 2023-08-24 LAB
ALBUMIN SERPL BCG-MCNC: 4.4 G/DL (ref 3.5–5.2)
ALP SERPL-CCNC: 115 U/L (ref 40–129)
ALT SERPL W P-5'-P-CCNC: 26 U/L (ref 0–70)
ANION GAP SERPL CALCULATED.3IONS-SCNC: 10 MMOL/L (ref 7–15)
AST SERPL W P-5'-P-CCNC: 25 U/L (ref 0–45)
BILIRUB SERPL-MCNC: 0.5 MG/DL
BUN SERPL-MCNC: 20 MG/DL (ref 8–23)
CALCIUM SERPL-MCNC: 9.5 MG/DL (ref 8.8–10.2)
CHLORIDE SERPL-SCNC: 104 MMOL/L (ref 98–107)
CHOLEST SERPL-MCNC: 109 MG/DL
CREAT SERPL-MCNC: 1.01 MG/DL (ref 0.67–1.17)
DEPRECATED HCO3 PLAS-SCNC: 25 MMOL/L (ref 22–29)
GFR SERPL CREATININE-BSD FRML MDRD: 76 ML/MIN/1.73M2
GLUCOSE SERPL-MCNC: 113 MG/DL (ref 70–99)
HDLC SERPL-MCNC: 38 MG/DL
LDLC SERPL CALC-MCNC: 39 MG/DL
NONHDLC SERPL-MCNC: 71 MG/DL
POTASSIUM SERPL-SCNC: 4.8 MMOL/L (ref 3.4–5.3)
PROT SERPL-MCNC: 7.5 G/DL (ref 6.4–8.3)
SODIUM SERPL-SCNC: 139 MMOL/L (ref 136–145)
TRIGL SERPL-MCNC: 159 MG/DL

## 2023-08-24 PROCEDURE — 99214 OFFICE O/P EST MOD 30 MIN: CPT | Mod: 25 | Performed by: PHYSICIAN ASSISTANT

## 2023-08-24 PROCEDURE — G0439 PPPS, SUBSEQ VISIT: HCPCS | Performed by: PHYSICIAN ASSISTANT

## 2023-08-24 PROCEDURE — 80061 LIPID PANEL: CPT | Performed by: PHYSICIAN ASSISTANT

## 2023-08-24 PROCEDURE — 80053 COMPREHEN METABOLIC PANEL: CPT | Performed by: PHYSICIAN ASSISTANT

## 2023-08-24 PROCEDURE — 36415 COLL VENOUS BLD VENIPUNCTURE: CPT | Performed by: PHYSICIAN ASSISTANT

## 2023-08-24 RX ORDER — LISINOPRIL 20 MG/1
20 TABLET ORAL DAILY
Qty: 90 TABLET | Refills: 3 | Status: SHIPPED | OUTPATIENT
Start: 2023-08-24 | End: 2024-08-28

## 2023-08-24 RX ORDER — METFORMIN HCL 500 MG
500 TABLET, EXTENDED RELEASE 24 HR ORAL
Qty: 90 TABLET | Refills: 1 | Status: SHIPPED | OUTPATIENT
Start: 2023-08-24 | End: 2024-02-12

## 2023-08-24 RX ORDER — ATORVASTATIN CALCIUM 40 MG/1
40 TABLET, FILM COATED ORAL DAILY
Qty: 90 TABLET | Refills: 3 | Status: SHIPPED | OUTPATIENT
Start: 2023-08-24 | End: 2024-08-28

## 2023-08-24 ASSESSMENT — ENCOUNTER SYMPTOMS
NERVOUS/ANXIOUS: 0
DIARRHEA: 0
DYSURIA: 0
CONSTIPATION: 0
SORE THROAT: 0
FREQUENCY: 0
PARESTHESIAS: 0
SHORTNESS OF BREATH: 0
ABDOMINAL PAIN: 0
EYE PAIN: 0
MYALGIAS: 0
HEADACHES: 0
CHILLS: 0
JOINT SWELLING: 0
COUGH: 0
ARTHRALGIAS: 1
NAUSEA: 0
WEAKNESS: 0
HEMATOCHEZIA: 0
PALPITATIONS: 0
HEARTBURN: 0
DIZZINESS: 0
FEVER: 0
HEMATURIA: 0

## 2023-08-24 ASSESSMENT — ACTIVITIES OF DAILY LIVING (ADL): CURRENT_FUNCTION: NO ASSISTANCE NEEDED

## 2023-08-25 NOTE — PROGRESS NOTES
"SUBJECTIVE:   Sushant Zepeda is a 78 year old who presents for Preventive Visit.       No data to display                Are you in the first 12 months of your Medicare coverage?  No    Healthy Habits:     In general, how would you rate your overall health?  Good    Frequency of exercise:  1 day/week    Duration of exercise:  Less than 15 minutes    Do you usually eat at least 4 servings of fruit and vegetables a day, include whole grains    & fiber and avoid regularly eating high fat or \"junk\" foods?  Yes    Taking medications regularly:  Yes    Medication side effects:  None    Ability to successfully perform activities of daily living:  No assistance needed    Home Safety:  No safety concerns identified    Hearing Impairment:  Difficulty following a conversation in a noisy restaurant or crowded room, need to ask people to speak up or repeat themselves, difficulty understanding soft or whispered speech and difficulty understanding speech on the telephone    In the past 6 months, have you been bothered by leaking of urine?  No    In general, how would you rate your overall mental or emotional health?  Good    Additional concerns today:  No        Have you ever done Advance Care Planning? (For example, a Health Directive, POLST, or a discussion with a medical provider or your loved ones about your wishes): No, advance care planning information given to patient to review.  Patient declined advance care planning discussion at this time.    Fall risk  Fallen 2 or more times in the past year?: No  Any fall with injury in the past year?: No  click delete button to remove this line now  Cognitive Screening   1) Repeat 3 items (Leader, Season, Table)    2) Clock draw: NORMAL  3) 3 item recall: Recalls 1 object   Results: NORMAL clock, 1-2 items recalled: COGNITIVE IMPAIRMENT LESS LIKELY    Mini-CogTM Copyright SARAH Mota. Licensed by the author for use in [facility]; reprinted with permission (konrad@.[facility]). All " rights reserved.      Do you have sleep apnea, excessive snoring or daytime drowsiness? : no    Reviewed and updated as needed this visit by clinical staff   Tobacco  Allergies  Meds  Problems  Med Hx  Surg Hx  Fam Hx          Reviewed and updated as needed this visit by Provider   Tobacco  Allergies  Meds  Problems  Med Hx  Surg Hx  Fam Hx         Social History     Tobacco Use    Smoking status: Never    Smokeless tobacco: Never   Substance Use Topics    Alcohol use: Yes     Comment: Have maybe 1 drink  a week             8/21/2023     9:43 AM   Alcohol Use   Prescreen: >3 drinks/day or >7 drinks/week? No          No data to display              Do you have a current opioid prescription? No  Do you use any other controlled substances or medications that are not prescribed by a provider? None      Diabetes Follow-up    How often are you checking your blood sugar? One time daily  What time of day are you checking your blood sugars (select all that apply)?  Before meals  Have you had any blood sugars above 200?  No  Have you had any blood sugars below 70?  No  What symptoms do you notice when your blood sugar is low?  None  What concerns do you have today about your diabetes? None   Do you have any of these symptoms? (Select all that apply)  No numbness or tingling in feet.  No redness, sores or blisters on feet.  No complaints of excessive thirst.  No reports of blurry vision.  No significant changes to weight.          Hyperlipidemia Follow-Up    Are you regularly taking any medication or supplement to lower your cholesterol?   Yes- statin  Are you having muscle aches or other side effects that you think could be caused by your cholesterol lowering medication?  No    Hypertension Follow-up    Do you check your blood pressure regularly outside of the clinic? Yes   Are you following a low salt diet? Yes  Are your blood pressures ever more than 140 on the top number (systolic) OR more   than 90 on the  bottom number (diastolic), for example 140/90? No    BP Readings from Last 2 Encounters:   08/24/23 130/66   03/01/23 128/64     Hemoglobin A1C (%)   Date Value   01/17/2023 6.9 (H)   12/12/2022 6.9 (H)   02/22/2021 6.8 (H)   08/26/2020 6.7 (H)     LDL Cholesterol Calculated (mg/dL)   Date Value   12/12/2022 53   11/15/2021 57   08/26/2020 68   04/04/2019 72       Current providers sharing in care for this patient include:   Patient Care Team:  Kentrell Urias PA-C as PCP - General (Family Medicine)  Ramin Mo MD as MD (Urology)  Ramin Mo MD as Assigned Surgical Provider  Kentrell Urias PA-C as Assigned PCP  Chip Wilkinson MD as MD (Cardiovascular Disease)  Chip Wilkinson MD as Assigned Heart and Vascular Provider    The following health maintenance items are reviewed in Epic and correct as of today:  Health Maintenance   Topic Date Due    COVID-19 Vaccine (6 - Pfizer series) 11/11/2022    ALT  06/12/2023    A1C  07/17/2023    DIABETIC FOOT EXAM  08/24/2023    ANNUAL REVIEW OF HM ORDERS  08/24/2023    INFLUENZA VACCINE (1) 09/01/2023    EYE EXAM  12/09/2023    LIPID  12/12/2023    BMP  02/15/2024    CREATININE  02/15/2024    MICROALBUMIN  03/01/2024    MEDICARE ANNUAL WELLNESS VISIT  08/24/2024    FALL RISK ASSESSMENT  08/24/2024    COLORECTAL CANCER SCREENING  03/02/2027    ADVANCE CARE PLANNING  03/15/2027    DTAP/TDAP/TD IMMUNIZATION (3 - Td or Tdap) 08/16/2029    HEPATITIS C SCREENING  Completed    PHQ-2 (once per calendar year)  Completed    Pneumococcal Vaccine: 65+ Years  Completed    URINALYSIS  Completed    ZOSTER IMMUNIZATION  Completed    IPV IMMUNIZATION  Aged Out    MENINGITIS IMMUNIZATION  Aged Out     BP Readings from Last 3 Encounters:   08/24/23 130/66   03/01/23 128/64   02/16/23 134/59    Wt Readings from Last 3 Encounters:   08/24/23 77 kg (169 lb 11.2 oz)   03/01/23 80.7 kg (178 lb)   02/14/23 78.9 kg (174 lb)                  Patient Active Problem List   Diagnosis     HYPERLIPIDEMIA LDL GOAL <100    BPH (benign prostatic hyperplasia)    Hypertension goal BP (blood pressure) < 140/90    Advanced directives, counseling/discussion    Health Care Home    Type 2 diabetes mellitus without complication (H)    Acute right-sided low back pain with right-sided sciatica    CKD (chronic kidney disease) stage 2, GFR 60-89 ml/min    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)    Essential tremor    Strain of right gastrocnemius muscle    Strain of gastrocnemius muscle of left lower extremity    Chronic bilateral low back pain without sciatica    Left anterior fascicular block    S/P lumbar fusion     Past Surgical History:   Procedure Laterality Date    COLONOSCOPY N/A 3/2/2017    Procedure: COMBINED COLONOSCOPY, SINGLE OR MULTIPLE BIOPSY/POLYPECTOMY BY BIOPSY;  Surgeon: Ru Deal MD;  Location:  GI    CYSTOSCOPY, TRANSURETHRAL RESECTION (TUR) PROSTATE, COMBINED Left 1/8/2019    Procedure: Video cystopanendoscopy, left retrograde ureterogram and pyelogram, exam under anesthesia;  Surgeon: Addy Sofia MD;  Location:  OR     REMOVE TONSILS/ADENOIDS,<13 Y/O      T & A <12y.o.    OPTICAL TRACKING SYSTEM FUSION SPINE POSTERIOR LUMBAR THREE+ LEVELS N/A 2/14/2023    Procedure: L3 to LS1 Transforaminal Lumbar Interbody Fusion  L5 to S1 Smith-Lewis osteotomy via bilateral L5 to S1 facetectomys Central laminectomy, L3, L4, L5 and S1 Posterior instrumentation using a bilateral pedicle screw and vlad construct spanning from L3 to S1.;  Surgeon: Lam Castellanos MD;  Location:  OR    [facility] APPENDECTOMY      ZZC TOTAL HIP ARTHROPLASTY      Hip Replacement, Total       Social History     Tobacco Use    Smoking status: Never    Smokeless tobacco: Never   Substance Use Topics    Alcohol use: Yes     Comment: Have maybe 1 drink  a week     Family History   Problem Relation Age of Onset    Diabetes Mother     Heart Disease Mother     Eye Disorder Mother      Cerebrovascular Disease Father     Musculoskeletal Disorder Sister     Prostate Cancer No family hx of     Cancer - colorectal No family hx of          Current Outpatient Medications   Medication Sig Dispense Refill    atorvastatin (LIPITOR) 40 MG tablet Take 1 tablet (40 mg) by mouth daily 90 tablet 3    lisinopril (ZESTRIL) 20 MG tablet Take 1 tablet (20 mg) by mouth daily 90 tablet 3    metFORMIN (GLUCOPHAGE XR) 500 MG 24 hr tablet Take 1 tablet (500 mg) by mouth daily (with dinner) 90 tablet 1    blood glucose (NO BRAND SPECIFIED) lancets standard Use to test blood sugar 1 times daily or as directed. 100 lancet 3    blood glucose monitoring (NO BRAND SPECIFIED) meter device kit Use to test blood sugar 1 times daily or as directed. 1 kit 0    calcium carbonate-vitamin D (OSCAL) 500-5 MG-MCG tablet Take 1 tablet by mouth 3 times daily (before meals) 270 tablet 0    CONTOUR NEXT TEST test strip USE TO TEST BLOOD SUGAR DAILY AS DIRECTED BY PROVIDER 100 strip 1    EPINEPHrine (ANY BX GENERIC EQUIV) 0.3 MG/0.3ML injection 2-pack INJECT 0.3 MG IN THE MUSCLE ONCE AS NEEDED FOR ALLERGIC STATE 2 each 0    MULTI-VITAMIN OR TABS 1 tablet daily       Allergies   Allergen Reactions    Nsaids      aleve, ibuprofen     hives and swelling           Review of Systems   Constitutional:  Negative for chills and fever.   HENT:  Positive for ear pain and hearing loss. Negative for congestion and sore throat.    Eyes:  Negative for pain and visual disturbance.   Respiratory:  Negative for cough and shortness of breath.    Cardiovascular:  Negative for chest pain, palpitations and peripheral edema.   Gastrointestinal:  Negative for abdominal pain, constipation, diarrhea, heartburn, hematochezia and nausea.   Genitourinary:  Negative for dysuria, frequency, genital sores, hematuria, impotence, penile discharge and urgency.   Musculoskeletal:  Positive for arthralgias. Negative for joint swelling and myalgias.   Skin:  Negative for rash.  "  Neurological:  Negative for dizziness, weakness, headaches and paresthesias.   Psychiatric/Behavioral:  Negative for mood changes. The patient is not nervous/anxious.        OBJECTIVE:   /66   Pulse 67   Temp 97.4  F (36.3  C)   Resp 14   Ht 1.651 m (5' 5\")   Wt 77 kg (169 lb 11.2 oz)   SpO2 97%   BMI 28.24 kg/m   Estimated body mass index is 28.24 kg/m  as calculated from the following:    Height as of this encounter: 1.651 m (5' 5\").    Weight as of this encounter: 77 kg (169 lb 11.2 oz).  Physical Exam  GENERAL: healthy, alert and no distress  RESP: lungs clear to auscultation - no rales, rhonchi or wheezes  CV: regular rates and rhythm  PSYCH: mentation appears normal, affect normal/bright    Diagnostic Test Results:  No results found for this or any previous visit (from the past 24 hour(s)).    ASSESSMENT / PLAN:   (Z00.00) Encounter for Medicare annual wellness exam  (primary encounter diagnosis)  Comment: stable wellness. See below.   Plan:     (E78.5) Hyperlipidemia LDL goal <100  Comment: labs pending. Stable on current regimen. If stable labs, recheck in 1 year.    Plan: atorvastatin (LIPITOR) 40 MG tablet,         Comprehensive metabolic panel (BMP + Alb, Alk         Phos, ALT, AST, Total. Bili, TP), Lipid panel         reflex to direct LDL Fasting        Medication use and side effects discussed with the patient. Patient is in complete understanding and agreement with plan.       (I10) Essential hypertension, benign  Comment: as above. Bp controlled  today.   Plan: lisinopril (ZESTRIL) 20 MG tablet,         Comprehensive metabolic panel (BMP + Alb, Alk         Phos, ALT, AST, Total. Bili, TP)        Medication use and side effects discussed with the patient. Patient is in complete understanding and agreement with plan.       (E11.9) Type 2 diabetes mellitus without complication, without long-term current use of insulin (H)  Comment: stable on current regimen. Home sugars controlled. Labs  " "pending. If stable, continue on current regimen  and recheck in 6 months.    Plan: metFORMIN (GLUCOPHAGE XR) 500 MG 24 hr tablet,         Hemoglobin A1c        Medication use and side effects discussed with the patient. Patient is in complete understanding and agreement with plan.       Patient has been advised of split billing requirements and indicates understanding: Yes      COUNSELING:  Reviewed preventive health counseling, as reflected in patient instructions       Regular exercise       Healthy diet/nutrition      BMI:   Estimated body mass index is 28.24 kg/m  as calculated from the following:    Height as of this encounter: 1.651 m (5' 5\").    Weight as of this encounter: 77 kg (169 lb 11.2 oz).         He reports that he has never smoked. He has never used smokeless tobacco.      Appropriate preventive services were discussed with this patient, including applicable screening as appropriate for cardiovascular disease, diabetes, osteopenia/osteoporosis, and glaucoma.  As appropriate for age/gender, discussed screening for colorectal cancer, prostate cancer, breast cancer, and cervical cancer. Checklist reviewing preventive services available has been given to the patient.    Reviewed patients plan of care and provided an AVS. The Basic Care Plan (routine screening as documented in Health Maintenance) for Sushant meets the Care Plan requirement. This Care Plan has been established and reviewed with the Patient.          SYLVIA [facility]    Identified Health Risks:  I have reviewed Opioid Use Disorder and Substance Use Disorder risk factors and made any needed referrals.   "

## 2023-08-25 NOTE — PATIENT INSTRUCTIONS
Patient Education   Personalized Prevention Plan  You are due for the preventive services outlined below.  Your care team is available to assist you in scheduling these services.  If you have already completed any of these items, please share that information with your care team to update in your medical record.  Health Maintenance Due   Topic Date Due     COVID-19 Vaccine (6 - Pfizer series) 11/11/2022     Annual Wellness Visit  03/15/2023     Liver Monitoring Lab  06/12/2023     A1C Lab  07/17/2023     Diabetic Foot Exam  08/24/2023     ANNUAL REVIEW OF HM ORDERS  08/24/2023

## 2023-08-27 ENCOUNTER — HEALTH MAINTENANCE LETTER (OUTPATIENT)
Age: 78
End: 2023-08-27

## 2023-08-27 ENCOUNTER — MYC MEDICAL ADVICE (OUTPATIENT)
Dept: FAMILY MEDICINE | Facility: CLINIC | Age: 78
End: 2023-08-27
Payer: MEDICARE

## 2023-08-28 ENCOUNTER — LAB (OUTPATIENT)
Dept: LAB | Facility: CLINIC | Age: 78
End: 2023-08-28
Payer: MEDICARE

## 2023-08-28 DIAGNOSIS — E11.9 TYPE 2 DIABETES MELLITUS WITHOUT COMPLICATION, WITHOUT LONG-TERM CURRENT USE OF INSULIN (H): ICD-10-CM

## 2023-08-28 LAB — HBA1C MFR BLD: 7.1 % (ref 0–5.6)

## 2023-08-28 PROCEDURE — 83036 HEMOGLOBIN GLYCOSYLATED A1C: CPT

## 2023-08-28 PROCEDURE — 36415 COLL VENOUS BLD VENIPUNCTURE: CPT

## 2023-08-28 NOTE — TELEPHONE ENCOUNTER
See result note from 8/25/23.  I have not heard back on this. It still says future. This was the day the system went down. What is the status of this?    -daphne barton, pac

## 2023-08-28 NOTE — TELEPHONE ENCOUNTER
Spoke to lab.  They did not have purple tube for A1C.  He will need lab appt.  Called patient and advised.  Scheduled for lab appt today at 4 pm.  Marcia Espinoza RN

## 2023-10-21 ENCOUNTER — TRANSFERRED RECORDS (OUTPATIENT)
Dept: HEALTH INFORMATION MANAGEMENT | Facility: CLINIC | Age: 78
End: 2023-10-21
Payer: MEDICARE

## 2023-10-24 ENCOUNTER — OFFICE VISIT (OUTPATIENT)
Dept: UROLOGY | Facility: CLINIC | Age: 78
End: 2023-10-24
Payer: MEDICARE

## 2023-10-24 VITALS
DIASTOLIC BLOOD PRESSURE: 70 MMHG | HEART RATE: 76 BPM | HEIGHT: 65 IN | WEIGHT: 168 LBS | SYSTOLIC BLOOD PRESSURE: 138 MMHG | BODY MASS INDEX: 27.99 KG/M2

## 2023-10-24 DIAGNOSIS — N31.2 ATONIC BLADDER: Primary | ICD-10-CM

## 2023-10-24 DIAGNOSIS — N13.5 URETEROPELVIC JUNCTION (UPJ) OBSTRUCTION, LEFT: ICD-10-CM

## 2023-10-24 DIAGNOSIS — N18.2 CKD (CHRONIC KIDNEY DISEASE) STAGE 2, GFR 60-89 ML/MIN: ICD-10-CM

## 2023-10-24 DIAGNOSIS — R39.89 SUSPECTED UTI: ICD-10-CM

## 2023-10-24 LAB
ALBUMIN UR-MCNC: NEGATIVE MG/DL
APPEARANCE UR: ABNORMAL
BILIRUB UR QL STRIP: NEGATIVE
COLOR UR AUTO: YELLOW
GLUCOSE UR STRIP-MCNC: 100 MG/DL
HGB UR QL STRIP: ABNORMAL
KETONES UR STRIP-MCNC: NEGATIVE MG/DL
LEUKOCYTE ESTERASE UR QL STRIP: ABNORMAL
NITRATE UR QL: POSITIVE
PH UR STRIP: 6 [PH] (ref 5–7)
SP GR UR STRIP: 1.01 (ref 1–1.03)
UROBILINOGEN UR STRIP-ACNC: 0.2 E.U./DL

## 2023-10-24 PROCEDURE — 87088 URINE BACTERIA CULTURE: CPT | Performed by: STUDENT IN AN ORGANIZED HEALTH CARE EDUCATION/TRAINING PROGRAM

## 2023-10-24 PROCEDURE — 87086 URINE CULTURE/COLONY COUNT: CPT | Performed by: STUDENT IN AN ORGANIZED HEALTH CARE EDUCATION/TRAINING PROGRAM

## 2023-10-24 PROCEDURE — 81003 URINALYSIS AUTO W/O SCOPE: CPT | Mod: QW | Performed by: STUDENT IN AN ORGANIZED HEALTH CARE EDUCATION/TRAINING PROGRAM

## 2023-10-24 PROCEDURE — 87186 SC STD MICRODIL/AGAR DIL: CPT | Performed by: STUDENT IN AN ORGANIZED HEALTH CARE EDUCATION/TRAINING PROGRAM

## 2023-10-24 PROCEDURE — 99213 OFFICE O/P EST LOW 20 MIN: CPT | Performed by: STUDENT IN AN ORGANIZED HEALTH CARE EDUCATION/TRAINING PROGRAM

## 2023-10-24 ASSESSMENT — PAIN SCALES - GENERAL: PAINLEVEL: NO PAIN (0)

## 2023-10-24 NOTE — NURSING NOTE
Chief Complaint   Patient presents with    atonic bladder     Pt states he has no UTI symptoms currently    Stephanie Ortega, EMT

## 2023-10-24 NOTE — LETTER
"10/24/2023       RE: Sushant Zepeda  [address]  [address] [place] [id number]     Dear Colleague,    Thank you for referring your patient, Sushant Zepeda, to the [facility] [place] at [facility]. Please see a copy of my visit note below.    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      He continues to cath 4-5 times a day without issue. Aims for about 2000 ml uop/day, sometimes dips to 6579-0629 ml depending on fluid intake    Had UTI w/ Klebsiella earlier this year but none recently. Currently denies symptoms      PHYSICAL EXAM  Patient is a 78 year old  male   Vitals: Blood pressure 138/70, pulse 76, height 1.651 m (5' 5\"), weight 76.2 kg (168 lb).  Body mass index is 27.96 kg/m .  General Appearance Adult:   Alert, no acute distress, oriented  HENT: throat/mouth:normal, good dentition  Lungs: no respiratory distress, or pursed lip breathing  Heart: No obvious jugular venous distension present  Abdomen: nondistended  Musculoskeltal: extremities normal, no peripheral edema  Skin: no suspicious lesions or rashes  Neuro: Alert, oriented, speech and mentation normal  Psych: affect and mood normal  Component      Latest Ref Rng 10/24/2023  10:04 AM   Color Urine      Colorless, Straw, Light Yellow, Yellow  Yellow    Appearance Urine      Clear  Slightly Cloudy !    Glucose Urine      Negative mg/dL 100 !    Bilirubin Urine      Negative  Negative    Ketones Urine      Negative mg/dL Negative    Specific Gravity Urine      1.003 - 1.035  1.010    Blood Urine      Negative  Trace !    pH Urine      5.0 - 7.0  6.0    Protein Albumin Urine      Negative mg/dL Negative  "   Urobilinogen Urine      0.2, 1.0 E.U./dL 0.2    Nitrite Urine      Negative  Positive !    Leukocyte Esterase Urine      Negative  Moderate !       Legend:  ! Abnormal      Component      Latest Ref Rng 8/24/2023  3:59 PM   Sodium      136 - 145 mmol/L 139    Potassium      3.4 - 5.3 mmol/L 4.8    Chloride      98 - 107 mmol/L 104    Carbon Dioxide (CO2)      22 - 29 mmol/L 25    Anion Gap      7 - 15 mmol/L 10    Urea Nitrogen      8.0 - 23.0 mg/dL 20.0    Creatinine      0.67 - 1.17 mg/dL 1.01    Calcium      8.8 - 10.2 mg/dL 9.5    Glucose      70 - 99 mg/dL 113 (H)    Alkaline Phosphatase      40 - 129 U/L 115    AST      0 - 45 U/L 25    ALT      0 - 70 U/L 26    Protein Total      6.4 - 8.3 g/dL 7.5    Albumin      3.5 - 5.2 g/dL 4.4    Bilirubin Total      <=1.2 mg/dL 0.5    GFR Estimate      >60 mL/min/1.73m2 76       Legend:  (H) High  ASSESSMENT and PLAN  78 year old male returns today for 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      Renal function August 2023 was stable,     UA today with positive nitrites, suspected UTI. Urine sent for culture. May choose not to treat asymptomatic bacteruria    - continue to catheterize 4 times a day at least  - contact office if develops UTI symptoms, then should treat with abx  - return 1 year with UA        Ramin Mo MD   [facility]  [facility] Phone: [phone]

## 2023-10-24 NOTE — PROGRESS NOTES
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      He continues to cath 4-5 times a day without issue. Aims for about 2000 ml uop/day, sometimes dips to 9715-5456 ml depending on fluid intake    Had UTI w/ Klebsiella earlier this year but none recently. Currently denies symptoms      PHYSICAL EXAM  Patient is a 78 year old  male   Vitals: Blood pressure 138/70, pulse 76, height 1.651 m (5' 5\"), weight 76.2 kg (168 lb).  Body mass index is 27.96 kg/m .  General Appearance Adult:   Alert, no acute distress, oriented  HENT: throat/mouth:normal, good dentition  Lungs: no respiratory distress, or pursed lip breathing  Heart: No obvious jugular venous distension present  Abdomen: nondistended  Musculoskeltal: extremities normal, no peripheral edema  Skin: no suspicious lesions or rashes  Neuro: Alert, oriented, speech and mentation normal  Psych: affect and mood normal  Component      Latest Ref Rng 10/24/2023  10:04 AM   Color Urine      Colorless, Straw, Light Yellow, Yellow  Yellow    Appearance Urine      Clear  Slightly Cloudy !    Glucose Urine      Negative mg/dL 100 !    Bilirubin Urine      Negative  Negative    Ketones Urine      Negative mg/dL Negative    Specific Gravity Urine      1.003 - 1.035  1.010    Blood Urine      Negative  Trace !    pH Urine      5.0 - 7.0  6.0    Protein Albumin Urine      Negative mg/dL Negative    Urobilinogen Urine      0.2, 1.0 E.U./dL 0.2    Nitrite Urine      Negative  Positive !    Leukocyte Esterase Urine      Negative  Moderate !       Legend:  ! Abnormal      Component      Latest Ref Rng 8/24/2023  3:59 PM   Sodium      136 - 145 mmol/L 139    Potassium      3.4 - 5.3 mmol/L 4.8    Chloride      98 - 107 mmol/L 104  "   Carbon Dioxide (CO2)      22 - 29 mmol/L 25    Anion Gap      7 - 15 mmol/L 10    Urea Nitrogen      8.0 - 23.0 mg/dL 20.0    Creatinine      0.67 - 1.17 mg/dL 1.01    Calcium      8.8 - 10.2 mg/dL 9.5    Glucose      70 - 99 mg/dL 113 (H)    Alkaline Phosphatase      40 - 129 U/L 115    AST      0 - 45 U/L 25    ALT      0 - 70 U/L 26    Protein Total      6.4 - 8.3 g/dL 7.5    Albumin      3.5 - 5.2 g/dL 4.4    Bilirubin Total      <=1.2 mg/dL 0.5    GFR Estimate      >60 mL/min/1.73m2 76       Legend:  (H) High  ASSESSMENT and PLAN  78 year old male returns today for 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      Renal function August 2023 was stable,     UA today with positive nitrites, suspected UTI. Urine sent for culture. May choose not to treat asymptomatic bacteruria    - continue to catheterize 4 times a day at least  - contact office if develops UTI symptoms, then should treat with abx  - return 1 year with UA        Ramin Mo MD   [facility]  [facility] Phone: [phone]

## 2023-10-26 LAB — BACTERIA UR CULT: ABNORMAL

## 2023-10-27 ENCOUNTER — TRANSFERRED RECORDS (OUTPATIENT)
Dept: HEALTH INFORMATION MANAGEMENT | Facility: CLINIC | Age: 78
End: 2023-10-27
Payer: MEDICARE

## 2023-10-27 DIAGNOSIS — N39.0 URINARY TRACT INFECTION: ICD-10-CM

## 2023-10-27 DIAGNOSIS — R39.89 SUSPECTED UTI: Primary | ICD-10-CM

## 2023-10-27 RX ORDER — SULFAMETHOXAZOLE/TRIMETHOPRIM 800-160 MG
1 TABLET ORAL 2 TIMES DAILY
Qty: 14 TABLET | Refills: 0 | Status: SHIPPED | OUTPATIENT
Start: 2023-10-27 | End: 2023-11-03

## 2023-11-09 ENCOUNTER — TRANSFERRED RECORDS (OUTPATIENT)
Dept: HEALTH INFORMATION MANAGEMENT | Facility: CLINIC | Age: 78
End: 2023-11-09
Payer: MEDICARE

## 2023-11-27 ENCOUNTER — TELEPHONE (OUTPATIENT)
Dept: FAMILY MEDICINE | Facility: CLINIC | Age: 78
End: 2023-11-27
Payer: MEDICARE

## 2023-11-27 NOTE — TELEPHONE ENCOUNTER
Pharmacist calls, faxed medicare DM supply form to [phone] fax number, faxed about 15 minutes ago, will need PCP to sign off, faxed to [employer], please find fax and process    Radha Frazier RN, BSN  [facility]

## 2023-11-28 ENCOUNTER — TELEPHONE (OUTPATIENT)
Dept: FAMILY MEDICINE | Facility: CLINIC | Age: 78
End: 2023-11-28
Payer: MEDICARE

## 2023-11-28 NOTE — TELEPHONE ENCOUNTER
Prior Authorization Retail Medication Request    Medication/Dose: CONTOUR NEXT TEST test strip    Insurance   Primary: MEDICARE  Insurance ID: [id number]    Secondary (if applicable): [facility]  Insurance ID: [id number]

## 2023-11-30 ENCOUNTER — MEDICAL CORRESPONDENCE (OUTPATIENT)
Dept: HEALTH INFORMATION MANAGEMENT | Facility: CLINIC | Age: 78
End: 2023-11-30
Payer: MEDICARE

## 2023-11-30 NOTE — TELEPHONE ENCOUNTER
Found form- type of diabetes, testing frequency, if using insulin or pump, A! Date and result and question on why testing more than once a day all answered.  Form to JOSEPH Acuna to have Tan resign with today's date and fax back.  Marcia Espinoza RN

## 2023-11-30 NOTE — TELEPHONE ENCOUNTER
Patient calling and [facility] pharmacy told him form was not filled out completely and that [facility] is faxing a new form.  States portion of form not filled out and Medicare needs completely fill out.  Leaving on cruise tomorrow.  Marcia Espinoza RN     Resident

## 2023-12-01 NOTE — TELEPHONE ENCOUNTER
PRIOR AUTHORIZATION DENIED    Called pharmacy and informed them it needs to be billed through the patient's Part B insurance.    Medication: CONTOUR NEXT TEST VI STRP  Insurance Company: [employer] [place] - Phone [phone] Fax [phone]  Denial Date: 12/1/2023  Denial Rational: DIABETIC SUPPLIES ARE NOT COVERED UNDER PATIENT'S PART D INSURANCE - BILL PART B      Appeal Information: IF PROVIDER WOULD LIKE TO APPEAL THIS DECISION PLEASE PROVIDE THE PA TEAM WITH A LETTER OF MEDICAL NECESSITY      Patient Notified: No

## 2023-12-01 NOTE — TELEPHONE ENCOUNTER
Central Prior Authorization Team   Phone: [phone]    PA Initiation    Medication: CONTOUR NEXT TEST VI STRP  Insurance Company: [employer] [place] - Phone [phone] Fax [phone]  Pharmacy Filling the Rx: [id number] [address] AT [place] [address]  Filling Pharmacy Phone: [phone]  Filling Pharmacy Fax:    Start Date: 11/30/2023

## 2023-12-12 ENCOUNTER — TRANSFERRED RECORDS (OUTPATIENT)
Dept: HEALTH INFORMATION MANAGEMENT | Facility: CLINIC | Age: 78
End: 2023-12-12
Payer: MEDICARE

## 2023-12-12 LAB — RETINOPATHY: NEGATIVE

## 2023-12-14 ENCOUNTER — TELEPHONE (OUTPATIENT)
Dept: FAMILY MEDICINE | Facility: CLINIC | Age: 78
End: 2023-12-14
Payer: MEDICARE

## 2023-12-14 NOTE — TELEPHONE ENCOUNTER
Patient Quality Outreach    Patient is due for the following:   Diabetes -  Eye Exam    Next Steps:   No follow up needed at this time.    Type of outreach:    Chart review performed, no outreach needed. and patient has been seen with with [facility]-      Questions for provider review:    None           Elsy Tom MA

## 2024-01-18 ENCOUNTER — TRANSFERRED RECORDS (OUTPATIENT)
Dept: HEALTH INFORMATION MANAGEMENT | Facility: CLINIC | Age: 79
End: 2024-01-18
Payer: MEDICARE

## 2024-02-08 ENCOUNTER — LAB (OUTPATIENT)
Dept: LAB | Facility: CLINIC | Age: 79
End: 2024-02-08
Payer: MEDICARE

## 2024-02-08 DIAGNOSIS — E11.9 TYPE 2 DIABETES MELLITUS WITHOUT COMPLICATION, WITHOUT LONG-TERM CURRENT USE OF INSULIN (H): ICD-10-CM

## 2024-02-08 LAB — HBA1C MFR BLD: 7.5 % (ref 0–5.6)

## 2024-02-08 PROCEDURE — 83036 HEMOGLOBIN GLYCOSYLATED A1C: CPT

## 2024-02-08 PROCEDURE — 36415 COLL VENOUS BLD VENIPUNCTURE: CPT

## 2024-02-12 DIAGNOSIS — E11.9 TYPE 2 DIABETES MELLITUS WITHOUT COMPLICATION, WITHOUT LONG-TERM CURRENT USE OF INSULIN (H): ICD-10-CM

## 2024-02-12 RX ORDER — METFORMIN HCL 500 MG
500 TABLET, EXTENDED RELEASE 24 HR ORAL
Qty: 90 TABLET | Refills: 1 | Status: SHIPPED | OUTPATIENT
Start: 2024-02-12 | End: 2024-02-27

## 2024-02-14 ENCOUNTER — MYC MEDICAL ADVICE (OUTPATIENT)
Dept: FAMILY MEDICINE | Facility: CLINIC | Age: 79
End: 2024-02-14
Payer: MEDICARE

## 2024-02-15 ENCOUNTER — TELEPHONE (OUTPATIENT)
Dept: FAMILY MEDICINE | Facility: CLINIC | Age: 79
End: 2024-02-15
Payer: MEDICARE

## 2024-02-15 NOTE — TELEPHONE ENCOUNTER
Was told to see Kentrell Urias to discuss A1C.  He is booked till April.  Wants to see if he can get in sooner..    Please call patient today if possible.  OK to LM on  VM

## 2024-02-19 ENCOUNTER — TRANSFERRED RECORDS (OUTPATIENT)
Dept: HEALTH INFORMATION MANAGEMENT | Facility: CLINIC | Age: 79
End: 2024-02-19
Payer: MEDICARE

## 2024-02-27 ENCOUNTER — OFFICE VISIT (OUTPATIENT)
Dept: FAMILY MEDICINE | Facility: CLINIC | Age: 79
End: 2024-02-27
Payer: MEDICARE

## 2024-02-27 VITALS
BODY MASS INDEX: 30.66 KG/M2 | OXYGEN SATURATION: 96 % | RESPIRATION RATE: 15 BRPM | WEIGHT: 184 LBS | DIASTOLIC BLOOD PRESSURE: 69 MMHG | SYSTOLIC BLOOD PRESSURE: 126 MMHG | HEIGHT: 65 IN | HEART RATE: 70 BPM | TEMPERATURE: 97.6 F

## 2024-02-27 DIAGNOSIS — E13.22 SECONDARY DIABETES MELLITUS WITH STAGE 2 CHRONIC KIDNEY DISEASE (H): ICD-10-CM

## 2024-02-27 DIAGNOSIS — N18.2 SECONDARY DIABETES MELLITUS WITH STAGE 2 CHRONIC KIDNEY DISEASE (H): ICD-10-CM

## 2024-02-27 DIAGNOSIS — E11.9 TYPE 2 DIABETES MELLITUS WITHOUT COMPLICATION, WITHOUT LONG-TERM CURRENT USE OF INSULIN (H): ICD-10-CM

## 2024-02-27 LAB
CREAT UR-MCNC: 92.8 MG/DL
MICROALBUMIN UR-MCNC: 26.6 MG/L
MICROALBUMIN/CREAT UR: 28.66 MG/G CR (ref 0–17)

## 2024-02-27 PROCEDURE — 99213 OFFICE O/P EST LOW 20 MIN: CPT | Performed by: PHYSICIAN ASSISTANT

## 2024-02-27 PROCEDURE — 82043 UR ALBUMIN QUANTITATIVE: CPT | Performed by: PHYSICIAN ASSISTANT

## 2024-02-27 PROCEDURE — 82570 ASSAY OF URINE CREATININE: CPT | Performed by: PHYSICIAN ASSISTANT

## 2024-02-27 RX ORDER — METFORMIN HCL 500 MG
500 TABLET, EXTENDED RELEASE 24 HR ORAL
Qty: 90 TABLET | Refills: 1 | Status: SHIPPED | OUTPATIENT
Start: 2024-02-27 | End: 2024-08-28

## 2024-02-27 ASSESSMENT — PAIN SCALES - GENERAL: PAINLEVEL: MODERATE PAIN (5)

## 2024-02-27 NOTE — PROGRESS NOTES
"  Assessment & Plan     Type 2 diabetes mellitus without complication, without long-term current use of insulin (H)  Stable. To maintain current regimen and recheck in 6 months.   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    Weight as of this encounter: 83.5 kg (184 lb).   Weight management plan: Discussed healthy diet and exercise guidelines        Subjective   Sushant Zepeda is a 78 year old, presenting for the following health issues:  Diabetes        2/27/2024     9:39 AM   Additional Questions   Roomed by Sandra Villanueva     History of Present Illness       Diabetes:   He presents for follow up of diabetes.  He is checking home blood glucose one time daily.   He checks blood glucose before meals.  Blood glucose is never over 200 and never under 70. He is aware of hypoglycemia symptoms including dizziness.   He is concerned about other.    He is not experiencing numbness or burning in feet, excessive thirst, blurry vision, weight changes or redness, sores or blisters on feet.           He eats 2-3 servings of fruits and vegetables daily.He consumes 0 sweetened beverage(s) daily.He exercises with enough effort to increase his heart rate 10 to 19 minutes per day.  He exercises with enough effort to increase his heart rate 6 days per week.   He is taking medications regularly.     Discuss A1C        Medication Followup of metformin 500 mg    Taking Medication as prescribed: yes  Side Effects:  None  Medication Helping Symptoms:  yes    Diabetes Follow-up    How often are you checking your blood sugar? One time daily  What time " "of day are you checking your blood sugars (select all that apply)?  Before meals  Have you had any blood sugars above 200?  No  Have you had any blood sugars below 70?  No  What symptoms do you notice when your blood sugar is low?  None  What concerns do you have today about your diabetes? None   Do you have any of these symptoms? (Select all that apply)  No numbness or tingling in feet.  No redness, sores or blisters on feet.  No complaints of excessive thirst.  No reports of blurry vision.  No significant changes to weight.      BP Readings from Last 2 Encounters:   02/27/24 126/69   10/24/23 138/70     Hemoglobin A1C (%)   Date Value   02/08/2024 7.5 (H)   08/28/2023 7.1 (H)   02/22/2021 6.8 (H)   08/26/2020 6.7 (H)     LDL Cholesterol Calculated (mg/dL)   Date Value   08/24/2023 39   12/12/2022 53   08/26/2020 68   04/04/2019 72     SKIN LESIONS ON RIGHT ARM. PRESENT FOR YEARS. ITCHING INTERMITTENTLY. NO BLEEDING. NO CHANGE OVER TIME.         Objective    /69 (BP Location: Right arm, Patient Position: Sitting, Cuff Size: Adult Regular)   Pulse 70   Temp 97.6  F (36.4  C) (Oral)   Resp 15   Ht 1.651 m (5' 5\")   Wt 83.5 kg (184 lb)   SpO2 96%   BMI 30.62 kg/m    Body mass index is 30.62 kg/m .  Physical Exam   GENERAL: alert and no distress  NECK: no adenopathy, no asymmetry, masses, or scars  RESP: lungs clear to auscultation - no rales, rhonchi or wheezes  CV: regular rates and rhythm, normal S1 S2, no S3 or S4, and no murmur, click or rub  PSYCH: mentation appears normal, affect normal/bright          Signed Electronically by: Kentrell Urias PA-C    "

## 2024-03-07 DIAGNOSIS — E11.9 TYPE 2 DIABETES, HBA1C GOAL < 8% (H): ICD-10-CM

## 2024-08-07 ENCOUNTER — TRANSFERRED RECORDS (OUTPATIENT)
Dept: HEALTH INFORMATION MANAGEMENT | Facility: CLINIC | Age: 79
End: 2024-08-07
Payer: MEDICARE

## 2024-08-11 ENCOUNTER — HEALTH MAINTENANCE LETTER (OUTPATIENT)
Age: 79
End: 2024-08-11

## 2024-08-28 ENCOUNTER — OFFICE VISIT (OUTPATIENT)
Dept: FAMILY MEDICINE | Facility: CLINIC | Age: 79
End: 2024-08-28
Payer: MEDICARE

## 2024-08-28 VITALS
HEIGHT: 65 IN | HEART RATE: 71 BPM | BODY MASS INDEX: 28.49 KG/M2 | RESPIRATION RATE: 18 BRPM | WEIGHT: 171 LBS | SYSTOLIC BLOOD PRESSURE: 115 MMHG | OXYGEN SATURATION: 98 % | DIASTOLIC BLOOD PRESSURE: 61 MMHG | TEMPERATURE: 97.7 F

## 2024-08-28 DIAGNOSIS — E13.22 SECONDARY DIABETES MELLITUS WITH STAGE 2 CHRONIC KIDNEY DISEASE (H): ICD-10-CM

## 2024-08-28 DIAGNOSIS — E78.5 HYPERLIPIDEMIA LDL GOAL <100: Primary | ICD-10-CM

## 2024-08-28 DIAGNOSIS — E11.9 TYPE 2 DIABETES MELLITUS WITHOUT COMPLICATION, WITHOUT LONG-TERM CURRENT USE OF INSULIN (H): ICD-10-CM

## 2024-08-28 DIAGNOSIS — N18.2 SECONDARY DIABETES MELLITUS WITH STAGE 2 CHRONIC KIDNEY DISEASE (H): ICD-10-CM

## 2024-08-28 DIAGNOSIS — I10 ESSENTIAL HYPERTENSION, BENIGN: ICD-10-CM

## 2024-08-28 LAB — HBA1C MFR BLD: 7.2 % (ref 0–5.6)

## 2024-08-28 PROCEDURE — 83036 HEMOGLOBIN GLYCOSYLATED A1C: CPT

## 2024-08-28 PROCEDURE — 36415 COLL VENOUS BLD VENIPUNCTURE: CPT

## 2024-08-28 PROCEDURE — 99214 OFFICE O/P EST MOD 30 MIN: CPT

## 2024-08-28 PROCEDURE — G2211 COMPLEX E/M VISIT ADD ON: HCPCS

## 2024-08-28 RX ORDER — ATORVASTATIN CALCIUM 40 MG/1
40 TABLET, FILM COATED ORAL DAILY
Qty: 90 TABLET | Refills: 4 | Status: SHIPPED | OUTPATIENT
Start: 2024-08-28

## 2024-08-28 RX ORDER — METFORMIN HCL 500 MG
500 TABLET, EXTENDED RELEASE 24 HR ORAL
Qty: 90 TABLET | Refills: 1 | Status: SHIPPED | OUTPATIENT
Start: 2024-08-28

## 2024-08-28 RX ORDER — LISINOPRIL 20 MG/1
20 TABLET ORAL DAILY
Qty: 90 TABLET | Refills: 4 | Status: SHIPPED | OUTPATIENT
Start: 2024-08-28

## 2024-08-28 ASSESSMENT — ANXIETY QUESTIONNAIRES
6. BECOMING EASILY ANNOYED OR IRRITABLE: NOT AT ALL
1. FEELING NERVOUS, ANXIOUS, OR ON EDGE: NOT AT ALL
GAD7 TOTAL SCORE: 0
3. WORRYING TOO MUCH ABOUT DIFFERENT THINGS: NOT AT ALL
GAD7 TOTAL SCORE: 0
2. NOT BEING ABLE TO STOP OR CONTROL WORRYING: NOT AT ALL
IF YOU CHECKED OFF ANY PROBLEMS ON THIS QUESTIONNAIRE, HOW DIFFICULT HAVE THESE PROBLEMS MADE IT FOR YOU TO DO YOUR WORK, TAKE CARE OF THINGS AT HOME, OR GET ALONG WITH OTHER PEOPLE: NOT DIFFICULT AT ALL
5. BEING SO RESTLESS THAT IT IS HARD TO SIT STILL: NOT AT ALL
7. FEELING AFRAID AS IF SOMETHING AWFUL MIGHT HAPPEN: NOT AT ALL

## 2024-08-28 ASSESSMENT — PATIENT HEALTH QUESTIONNAIRE - PHQ9
SUM OF ALL RESPONSES TO PHQ QUESTIONS 1-9: 0
5. POOR APPETITE OR OVEREATING: NOT AT ALL

## 2024-08-28 NOTE — PROGRESS NOTES
"  Assessment & Plan     (E78.5) Hyperlipidemia LDL goal <100  (primary encounter diagnosis)  Well controlled with use of atorvastatin. Will check lipid panel at next visit. No new side effects of the medication. Refill provided.  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 He has been doing well with metformin. No side effects. A1c today remains stable at 7.2%. No adjustments to medications. Refilled Metformin today. Follow up for diabetes recheck in 6 months.  Plan: HEMOGLOBIN A1C, Extra Tube          (I10) Essential hypertension, benign  Well controlled with use of lisinopril. Will check BMP at next visit. No new side effects of the medication. Refill provided.  Plan: lisinopril (ZESTRIL) 20 MG tablet          (E11.9) Type 2 diabetes mellitus without complication, without long-term current use of insulin (H)  See above.  Plan: metFORMIN (GLUCOPHAGE XR) 500 MG 24 hr tablet    The longitudinal plan of care for the diagnosis(es)/condition(s) as documented were addressed during this visit. Due to the added complexity in care, I will continue to support Sushant in the subsequent management and with ongoing continuity of care.          BMI  Estimated body mass index is 28.46 kg/m  as calculated from the following:    Height as of this encounter: 1.651 m (5' 5\").    Weight as of this encounter: 77.6 kg (171 lb).       Follow up in 3 weeks for physical; sooner with any acute concerns.    Lilia Canchola is a 79 year old, presenting for the following health issues:  Diabetes    History of Present Illness       Diabetes:   He presents for follow up of diabetes.  He is checking home blood glucose one time daily.   He checks blood glucose before meals.  Blood glucose is never over 200 and never under 70. He is aware of hypoglycemia symptoms " "including dizziness and weakness.    He has no concerns regarding his diabetes at this time.   He is not experiencing numbness or burning in feet, excessive thirst, blurry vision, weight changes or redness, sores or blisters on feet.           He eats 2-3 servings of fruits and vegetables daily.He consumes 0 sweetened beverage(s) daily.He exercises with enough effort to increase his heart rate 20 to 29 minutes per day.  He exercises with enough effort to increase his heart rate 3 or less days per week.   He is taking medications regularly.     -Sugars have been well controlled. Sugars have been in the 120-130 range fasting in the morning. Checks sugars once daily.    -Taking all medications. No acute concerns at this time.     Review of Systems  Constitutional, HEENT, cardiovascular, pulmonary, GI, , musculoskeletal, neuro, skin, endocrine and psych systems are negative, except as otherwise noted.        Objective    /61 (BP Location: Right arm, Patient Position: Sitting, Cuff Size: Adult Regular)   Pulse 71   Temp 97.7  F (36.5  C) (Oral)   Resp 18   Ht 1.651 m (5' 5\")   Wt 77.6 kg (171 lb)   SpO2 98%   BMI 28.46 kg/m    Body mass index is 28.46 kg/m .  Physical Exam   GENERAL: alert and no distress  RESP: lungs clear to auscultation - no rales, rhonchi or wheezes  CV: regular rate and rhythm, normal S1 S2, no S3 or S4, no murmur, click or rub  MS: no gross musculoskeletal defects noted  Diabetic foot exam: normal DP and PT pulses, no trophic changes or ulcerative lesions, normal sensory exam, and normal monofilament exam      Signed Electronically by: Rabia Moore PA-C    "

## 2024-09-06 ENCOUNTER — TELEPHONE (OUTPATIENT)
Facility: CLINIC | Age: 79
End: 2024-09-06
Payer: MEDICARE

## 2024-09-06 NOTE — TELEPHONE ENCOUNTER
[facility] Call Center    Phone Message    May a detailed message be left on voicemail: yes     Reason for Call: Other: Needing updated appt notes to be faxed to [phone]. If there are any questions can call [phone]. Thanks      Action Taken: Other: uro    Travel Screening: Not Applicable     Date of Service:

## 2024-09-10 ENCOUNTER — OFFICE VISIT (OUTPATIENT)
Dept: FAMILY MEDICINE | Facility: CLINIC | Age: 79
End: 2024-09-10
Attending: PHYSICIAN ASSISTANT
Payer: MEDICARE

## 2024-09-10 VITALS
DIASTOLIC BLOOD PRESSURE: 63 MMHG | SYSTOLIC BLOOD PRESSURE: 119 MMHG | RESPIRATION RATE: 17 BRPM | OXYGEN SATURATION: 99 % | BODY MASS INDEX: 27.4 KG/M2 | HEIGHT: 66 IN | WEIGHT: 170.5 LBS | HEART RATE: 66 BPM | TEMPERATURE: 97.4 F

## 2024-09-10 DIAGNOSIS — N18.2 SECONDARY DIABETES MELLITUS WITH STAGE 2 CHRONIC KIDNEY DISEASE (H): ICD-10-CM

## 2024-09-10 DIAGNOSIS — E13.22 SECONDARY DIABETES MELLITUS WITH STAGE 2 CHRONIC KIDNEY DISEASE (H): ICD-10-CM

## 2024-09-10 DIAGNOSIS — E78.5 HYPERLIPIDEMIA LDL GOAL <100: ICD-10-CM

## 2024-09-10 DIAGNOSIS — I10 ESSENTIAL HYPERTENSION, BENIGN: ICD-10-CM

## 2024-09-10 DIAGNOSIS — E11.9 TYPE 2 DIABETES MELLITUS WITHOUT COMPLICATION, WITHOUT LONG-TERM CURRENT USE OF INSULIN (H): ICD-10-CM

## 2024-09-10 DIAGNOSIS — Z00.00 ENCOUNTER FOR MEDICARE ANNUAL WELLNESS EXAM: Primary | ICD-10-CM

## 2024-09-10 LAB
ALBUMIN SERPL BCG-MCNC: 4.3 G/DL (ref 3.5–5.2)
ALP SERPL-CCNC: 104 U/L (ref 40–150)
ALT SERPL W P-5'-P-CCNC: 23 U/L (ref 0–70)
ANION GAP SERPL CALCULATED.3IONS-SCNC: 8 MMOL/L (ref 7–15)
AST SERPL W P-5'-P-CCNC: 22 U/L (ref 0–45)
BILIRUB SERPL-MCNC: 0.6 MG/DL
BUN SERPL-MCNC: 17.4 MG/DL (ref 8–23)
CALCIUM SERPL-MCNC: 9.6 MG/DL (ref 8.8–10.4)
CHLORIDE SERPL-SCNC: 103 MMOL/L (ref 98–107)
CHOLEST SERPL-MCNC: 111 MG/DL
CREAT SERPL-MCNC: 1.13 MG/DL (ref 0.67–1.17)
EGFRCR SERPLBLD CKD-EPI 2021: 66 ML/MIN/1.73M2
FASTING STATUS PATIENT QL REPORTED: YES
FASTING STATUS PATIENT QL REPORTED: YES
GLUCOSE SERPL-MCNC: 108 MG/DL (ref 70–99)
HCO3 SERPL-SCNC: 27 MMOL/L (ref 22–29)
HDLC SERPL-MCNC: 38 MG/DL
LDLC SERPL CALC-MCNC: 38 MG/DL
NONHDLC SERPL-MCNC: 73 MG/DL
POTASSIUM SERPL-SCNC: 5 MMOL/L (ref 3.4–5.3)
PROT SERPL-MCNC: 7.2 G/DL (ref 6.4–8.3)
SODIUM SERPL-SCNC: 138 MMOL/L (ref 135–145)
TRIGL SERPL-MCNC: 175 MG/DL

## 2024-09-10 PROCEDURE — G0439 PPPS, SUBSEQ VISIT: HCPCS

## 2024-09-10 PROCEDURE — 36415 COLL VENOUS BLD VENIPUNCTURE: CPT

## 2024-09-10 PROCEDURE — 99214 OFFICE O/P EST MOD 30 MIN: CPT | Mod: 25

## 2024-09-10 PROCEDURE — 80053 COMPREHEN METABOLIC PANEL: CPT

## 2024-09-10 PROCEDURE — 80061 LIPID PANEL: CPT

## 2024-09-10 ASSESSMENT — PAIN SCALES - GENERAL: PAINLEVEL: MILD PAIN (3)

## 2024-09-10 NOTE — PROGRESS NOTES
"Preventive Care Visit  [facility]  Rabia Moore PA-C, Family Medicine  Sep 10, 2024    Assessment & Plan     (Z00.00) Encounter for Medicare annual wellness exam  (primary encounter diagnosis)  Stable exam. Routine screening labs, he is fasting today. Follow up in 1 year for annual wellness; sooner as needed for acute concerns.  Plan: Lipid panel reflex to direct LDL Fasting,         Comprehensive metabolic panel (BMP + Alb, Alk         Phos, ALT, AST, Total. Bili, TP)          (E78.5) Hyperlipidemia LDL goal <100  On atorvastatin. Due for fasting lipid panel today. Will continue to monitor.   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 A1c of 7.2% and improved two weeks ago from previous check. Continue on Metformin 500 mg once daily. Follow up in 6 months for recheck; sooner with any acute.  Plan: Comprehensive metabolic panel (BMP + Alb, Alk         Phos, ALT, AST, Total. Bili, TP)         (I10) Essential hypertension, benign  Well controlled with use of lisinopril. Check CMP today. No new side effects of the medication. Refill provided.  Plan: Comprehensive metabolic panel (BMP + Alb, Alk         Phos, ALT, AST, Total. Bili, TP)    Patient has been advised of split billing requirements and indicates understanding: Yes    BMI  Estimated body mass index is 27.94 kg/m  as calculated from the following:    Height as of this encounter: 1.664 m (5' 5.5\").    Weight as of this encounter: 77.3 kg (170 lb 8 oz).     Counseling  Appropriate preventive services were addressed with this patient via screening, questionnaire, or discussion as appropriate for fall prevention, nutrition, physical activity, Tobacco-use cessation, social engagement, weight loss and " cognition.  Checklist reviewing preventive services available has been given to the patient.  Reviewed patient's diet, addressing concerns and/or questions.   He is at risk for psychosocial distress and has been provided with information to reduce risk.   The patient was provided with written information regarding signs of hearing loss.       Follow up in 6 months for diabetes; sooner with any acute concerns.    Lilia Canchola is a 79 year old, presenting for the following:  Wellness Visit        9/10/2024     9:07 AM   Additional Questions   Roomed by Olga Gallego EMT-B     Health Care Directive  Patient does not have a Health Care Directive or Living Will: Discussed advance care planning with patient; however, patient declined at this time.    HPI    Diabetes Follow-up  How often are you checking your blood sugar? One time daily  What time of day are you checking your blood sugars (select all that apply)?  Before meals  Have you had any blood sugars above 200?  No  Have you had any blood sugars below 70?  No  What symptoms do you notice when your blood sugar is low?  Shaky and Dizzy  What concerns do you have today about your diabetes? None   Do you have any of these symptoms? (Select all that apply)  No numbness or tingling in feet.  No redness, sores or blisters on feet.  No complaints of excessive thirst.  No reports of blurry vision.  No significant changes to weight.    BP Readings from Last 2 Encounters:   09/10/24 119/63   08/28/24 115/61     Hemoglobin A1C (%)   Date Value   08/28/2024 7.2 (H)   02/08/2024 7.5 (H)   02/22/2021 6.8 (H)   08/26/2020 6.7 (H)     LDL Cholesterol Calculated (mg/dL)   Date Value   08/24/2023 39   12/12/2022 53   08/26/2020 68   04/04/2019 72             Hyperlipidemia Follow-Up  Are you regularly taking any medication or supplement to lower your cholesterol?   Yes- atorvastatin  Are you having muscle aches or other side effects that you think could be caused by your  cholesterol lowering medication?  No    Hypertension Follow-up  Do you check your blood pressure regularly outside of the clinic? Yes   Are you following a low salt diet? No  Are your blood pressures ever more than 140 on the top number (systolic) OR more than 90 on the bottom number (diastolic), for example 140/90? No         9/5/2024   General Health   How would you rate your overall physical health? (!) FAIR   Feel stress (tense, anxious, or unable to sleep) Only a little      (!) STRESS CONCERN      9/5/2024   Nutrition   Diet: Diabetic            9/5/2024   Exercise   Days per week of moderate/strenous exercise 0 days   Average minutes spent exercising at this level 10 min      (!) EXERCISE CONCERN      9/5/2024   Social Factors   Frequency of gathering with friends or relatives Once a week   Worry food won't last until get money to buy more No   Food not last or not have enough money for food? No   Do you have housing? (Housing is defined as stable permanent housing and does not include staying ouside in a car, in a tent, in an abandoned building, in an overnight shelter, or couch-surfing.) Yes   Are you worried about losing your housing? No   Lack of transportation? No   Unable to get utilities (heat,electricity)? No            9/10/2024   Fall Risk   Gait Speed Test (Document in seconds) 4   Gait Speed Test Interpretation Less than or equal to 5.00 seconds - PASS             9/5/2024   Activities of Daily Living- Home Safety   Needs help with the following daily activites None of the above   Safety concerns in the home None of the above          9/5/2024   Dental   Dentist two times every year? Yes          9/5/2024   Hearing Screening   Hearing concerns? (!) I NEED TO ASK PEOPLE TO SPEAK UP OR REPEAT THEMSELVES.    (!) IT'S HARDER TO UNDERSTAND WOMEN'S VOICES THAN MEN'S VOICES.    (!) IT'S HARD TO FOLLOW A CONVERSATION IN A NOISY RESTAURANT OR CROWDED ROOM.    (!) TROUBLE UNDESTANDING A SPEAKER IN A PUBLIC  MEETING OR [employer]ICE.    (!) TROUBLE UNDERSTANDING SOFT OR WHISPERED SPEECH.    (!) TROUBLE UNDERSTANDING SPEECH ON THE TELEPHONE       Multiple values from one day are sorted in reverse-chronological order         9/5/2024   Driving Risk Screening   Patient/family members have concerns about driving No            9/5/2024   General Alertness/Fatigue Screening   Have you been more tired than usual lately? No          9/5/2024   Urinary Incontinence Screening   Bothered by leaking urine in past 6 months No          9/5/2024   TB Screening   Were you born outside of the [place]? No      Today's PHQ-2 Score:       9/9/2024     9:57 AM   PHQ-2 ( 1999 [employer])   Q1: Little interest or pleasure in doing things 0   Q2: Feeling down, depressed or hopeless 0   PHQ-2 Score 0   Q1: Little interest or pleasure in doing things Not at all   Q2: Feeling down, depressed or hopeless Not at all   PHQ-2 Score 0         9/5/2024   Substance Use   Alcohol more than 3/day or more than 7/wk No   Do you have a current opioid prescription? No   How severe/bad is pain from 1 to 10? 3/10   Do you use any other substances recreationally? (!) CANNABIS PRODUCTS        Social History     Tobacco Use    Smoking status: Never     Passive exposure: Never    Smokeless tobacco: Never   Vaping Use    Vaping status: Never Used   Substance Use Topics    Alcohol use: Yes     Comment: Have maybe 1 drink  a week    Drug use: No     ASCVD Risk   The ASCVD Risk score (Ronda SALDANA, et al., 2019) failed to calculate for the following reasons:    The valid total cholesterol range is 130 to 320 mg/dL    Reviewed and updated as needed this visit by Provider   Tobacco  Allergies  Meds  Problems  Med Hx  Surg Hx  Fam Hx            Past Medical History:   Diagnosis Date    Abnormal glucose     Acute right-sided low back pain with right-sided sciatica 06/27/2016    Diabetes (H)     Generalized osteoarthrosis, unspecified site     [place] (hard of hearing),  bilateral     Left anterior fascicular block 1/17/2023    Mumps     Renal disease     Unspecified essential hypertension      Past Surgical History:   Procedure Laterality Date    COLONOSCOPY N/A 3/2/2017    Procedure: COMBINED COLONOSCOPY, SINGLE OR MULTIPLE BIOPSY/POLYPECTOMY BY BIOPSY;  Surgeon: Ru Deal MD;  Location:  GI    CYSTOSCOPY, TRANSURETHRAL RESECTION (TUR) PROSTATE, COMBINED Left 1/8/2019    Procedure: Video cystopanendoscopy, left retrograde ureterogram and pyelogram, exam under anesthesia;  Surgeon: Addy Sofia MD;  Location:  OR    HC REMOVE TONSILS/ADENOIDS,<11 Y/O      T & A <12y.o.    OPTICAL TRACKING SYSTEM FUSION SPINE POSTERIOR LUMBAR THREE+ LEVELS N/A 2/14/2023    Procedure: L3 to LS1 Transforaminal Lumbar Interbody Fusion  L5 to S1 Smith-Lewis osteotomy via bilateral L5 to S1 facetectomys Central laminectomy, L3, L4, L5 and S1 Posterior instrumentation using a bilateral pedicle screw and vlad construct spanning from L3 to S1.;  Surgeon: Lam Castellanos MD;  Location:  OR    ZZC APPENDECTOMY      ZZC TOTAL HIP ARTHROPLASTY      Hip Replacement, Total     BP Readings from Last 3 Encounters:   09/10/24 119/63   08/28/24 115/61   02/27/24 126/69    Wt Readings from Last 3 Encounters:   09/10/24 77.3 kg (170 lb 8 oz)   08/28/24 77.6 kg (171 lb)   02/27/24 83.5 kg (184 lb)                  Patient Active Problem List   Diagnosis    HYPERLIPIDEMIA LDL GOAL <100    BPH (benign prostatic hyperplasia)    Hypertension goal BP (blood pressure) < 140/90    Type 2 diabetes mellitus without complication (H)    Acute right-sided low back pain with right-sided sciatica    CKD (chronic kidney disease) stage 2, GFR 60-89 ml/min    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)    Essential tremor    Strain of right gastrocnemius muscle    Strain of gastrocnemius muscle of left lower extremity    Chronic bilateral low back pain without sciatica    Left anterior  fascicular block    S/P lumbar fusion     Past Surgical History:   Procedure Laterality Date    COLONOSCOPY N/A 3/2/2017    Procedure: COMBINED COLONOSCOPY, SINGLE OR MULTIPLE BIOPSY/POLYPECTOMY BY BIOPSY;  Surgeon: Ru Deal MD;  Location:  GI    CYSTOSCOPY, TRANSURETHRAL RESECTION (TUR) PROSTATE, COMBINED Left 1/8/2019    Procedure: Video cystopanendoscopy, left retrograde ureterogram and pyelogram, exam under anesthesia;  Surgeon: Addy Sofia MD;  Location: [facility] OR    HC REMOVE TONSILS/ADENOIDS,<13 Y/O      T & A <12y.o.    OPTICAL TRACKING SYSTEM FUSION SPINE POSTERIOR LUMBAR THREE+ LEVELS N/A 2/14/2023    Procedure: L3 to LS1 Transforaminal Lumbar Interbody Fusion  L5 to S1 Smith-Lewis osteotomy via bilateral L5 to S1 facetectomys Central laminectomy, L3, L4, L5 and S1 Posterior instrumentation using a bilateral pedicle screw and vlad construct spanning from L3 to S1.;  Surgeon: Lam Castellanos MD;  Location:  OR    ZZC APPENDECTOMY      ZZC TOTAL HIP ARTHROPLASTY      Hip Replacement, Total       Social History     Tobacco Use    Smoking status: Never     Passive exposure: Never    Smokeless tobacco: Never   Substance Use Topics    Alcohol use: Yes     Comment: Have maybe 1 drink  a week     Family History   Problem Relation Age of Onset    Diabetes Mother     Heart Disease Mother     Eye Disorder Mother     Cerebrovascular Disease Father     Musculoskeletal Disorder Sister     Prostate Cancer No family hx of     Cancer - colorectal No family hx of          Current Outpatient Medications   Medication Sig Dispense Refill    atorvastatin (LIPITOR) 40 MG tablet Take 1 tablet (40 mg) by mouth daily. 90 tablet 4    blood glucose (NO BRAND SPECIFIED) lancets standard Use to test blood sugar 1 times daily or as directed. 100 lancet 3    blood glucose monitoring (NO BRAND SPECIFIED) meter device kit Use to test blood sugar 1 times daily or as directed. 1 kit 0    CONTOUR NEXT TEST test strip USE TO  TEST BLOOD SUGAR DAILY 100 strip 1    EPINEPHrine (ANY BX GENERIC EQUIV) 0.3 MG/0.3ML injection 2-pack INJECT 0.3 MG IN THE MUSCLE ONCE AS NEEDED FOR ALLERGIC STATE 2 each 0    lisinopril (ZESTRIL) 20 MG tablet Take 1 tablet (20 mg) by mouth daily. 90 tablet 4    metFORMIN (GLUCOPHAGE XR) 500 MG 24 hr tablet Take 1 tablet (500 mg) by mouth daily (with dinner). 90 tablet 1    MULTI-VITAMIN OR TABS 1 tablet daily       Allergies   Allergen Reactions    Nsaids      aleve, ibuprofen     hives and swelling       Current providers sharing in care for this patient include:  Patient Care Team:  Rabia Moore PA-C as PCP - General (Family Medicine)  Ramin Mo MD as MD (Urology)  Ramin Mo MD as Assigned Surgical Provider  Chip Wilkinson MD as MD (Cardiovascular Disease)  Shayan Montes MD as MD (Dermatology)  Kentrell Urias PA-C as Assigned PCP    The following health maintenance items are reviewed in Epic and correct as of today:  Health Maintenance   Topic Date Due    ANNUAL REVIEW OF HM ORDERS  08/24/2023    ALT  02/24/2024    BMP  08/24/2024    LIPID  08/24/2024    INFLUENZA VACCINE (1) 09/01/2024    COVID-19 Vaccine (8 - 2023-24 season) 09/01/2024    MEDICARE ANNUAL WELLNESS VISIT  08/24/2024    CREATININE  08/24/2024    EYE EXAM  12/12/2024    MICROALBUMIN  02/27/2025    A1C  02/28/2025    DIABETIC FOOT EXAM  08/28/2025    FALL RISK ASSESSMENT  09/10/2025    COLORECTAL CANCER SCREENING  03/02/2027    ADVANCE CARE PLANNING  08/24/2028    DTAP/TDAP/TD IMMUNIZATION (3 - Td or Tdap) 08/16/2029    HEPATITIS C SCREENING  Completed    PHQ-2 (once per calendar year)  Completed    Pneumococcal Vaccine: 65+ Years  Completed    URINALYSIS  Completed    ZOSTER IMMUNIZATION  Completed    HPV IMMUNIZATION  Aged Out    MENINGITIS IMMUNIZATION  Aged Out    RSV MONOCLONAL ANTIBODY  Aged Out       Review of Systems  Constitutional, HEENT, cardiovascular, pulmonary, GI, , musculoskeletal, neuro, skin, endocrine  "and psych systems are negative, except as otherwise noted.       Objective    Exam  /63 (BP Location: Right arm, Patient Position: Sitting, Cuff Size: Adult Regular)   Pulse 66   Temp 97.4  F (36.3  C) (Oral)   Resp 17   Ht 1.664 m (5' 5.5\")   Wt 77.3 kg (170 lb 8 oz)   SpO2 99%   BMI 27.94 kg/m     Estimated body mass index is 27.94 kg/m  as calculated from the following:    Height as of this encounter: 1.664 m (5' 5.5\").    Weight as of this encounter: 77.3 kg (170 lb 8 oz).    Physical Exam  GENERAL: alert and no distress  EYES: Eyes grossly normal to inspection, PERRL and conjunctivae and sclerae normal  RESP: lungs clear to auscultation - no rales, rhonchi or wheezes  CV: regular rate and rhythm, normal S1 S2, no S3 or S4, no murmur, click or rub, no peripheral edema  MS: no gross musculoskeletal defects noted, no edema        9/10/2024   Mini Cog   Clock Draw Score 0 Abnormal   3 Item Recall 3 objects recalled   Mini Cog Total Score 3        Signed Electronically by: Rabia Moore PA-C    "

## 2024-09-10 NOTE — PATIENT INSTRUCTIONS
Patient Education   Preventive Care Advice   This is general advice given by our system to help you stay healthy. However, your care team may have specific advice just for you. Please talk to your care team about your preventive care needs.  Nutrition  Eat 5 or more servings of fruits and vegetables each day.  Try wheat bread, brown rice and whole grain pasta (instead of white bread, rice, and pasta).  Get enough calcium and vitamin D. Check the label on foods and aim for 100% of the RDA (recommended daily allowance).  Lifestyle  Exercise at least 150 minutes each week  (30 minutes a day, 5 days a week).  Do muscle strengthening activities 2 days a week. These help control your weight and prevent disease.  No smoking.  Wear sunscreen to prevent skin cancer.  Have a dental exam and cleaning every 6 months.  Yearly exams  See your health care team every year to talk about:  Any changes in your health.  Any medicines your care team has prescribed.  Preventive care, family planning, and ways to prevent chronic diseases.  Shots (vaccines)   HPV shots (up to age 26), if you've never had them before.  Hepatitis B shots (up to age 59), if you've never had them before.  COVID-19 shot: Get this shot when it's due.  Flu shot: Get a flu shot every year.  Tetanus shot: Get a tetanus shot every 10 years.  Pneumococcal, hepatitis A, and RSV shots: Ask your care team if you need these based on your risk.  Shingles shot (for age 50 and up)  General health tests  Diabetes screening:  Starting at age 35, Get screened for diabetes at least every 3 years.  If you are younger than age 35, ask your care team if you should be screened for diabetes.  Cholesterol test: At age 39, start having a cholesterol test every 5 years, or more often if advised.  Bone density scan (DEXA): At age 50, ask your care team if you should have this scan for osteoporosis (brittle bones).  Hepatitis C: Get tested at least once in your life.  STIs (sexually  transmitted infections)  Before age 24: Ask your care team if you should be screened for STIs.  After age 24: Get screened for STIs if you're at risk. You are at risk for STIs (including HIV) if:  You are sexually active with more than one person.  You don't use condoms every time.  You or a partner was diagnosed with a sexually transmitted infection.  If you are at risk for HIV, ask about PrEP medicine to prevent HIV.  Get tested for HIV at least once in your life, whether you are at risk for HIV or not.  Cancer screening tests  Cervical cancer screening: If you have a cervix, begin getting regular cervical cancer screening tests starting at age 21.  Breast cancer scan (mammogram): If you've ever had breasts, begin having regular mammograms starting at age 40. This is a scan to check for breast cancer.  Colon cancer screening: It is important to start screening for colon cancer at age 45.  Have a colonoscopy test every 10 years (or more often if you're at risk) Or, ask your provider about stool tests like a FIT test every year or Cologuard test every 3 years.  To learn more about your testing options, visit:   .  For help making a decision, visit:   https://bit.ly/ys19781.  Prostate cancer screening test: If you have a prostate, ask your care team if a prostate cancer screening test (PSA) at age 55 is right for you.  Lung cancer screening: If you are a current or former smoker ages 50 to 80, ask your care team if ongoing lung cancer screenings are right for you.  For informational purposes only. Not to replace the advice of your health care provider. Copyright   2023 [facility]. All rights reserved. Clinically reviewed by the [facility]. [employer] [id number] - REV 01/24.  Preventing Falls: Care Instructions  Injuries and health problems such as trouble walking or poor eyesight can increase your risk of falling. So can some medicines. But there are things you can do to help  "prevent falls. You can exercise to get stronger. You can also arrange your home to make it safer.    Talk to your doctor about the medicines you take. Ask if any of them increase the risk of falls and whether they can be changed or stopped.   Try to exercise regularly. It can help improve your strength and balance. This can help lower your risk of falling.     Practice fall safety and prevention.    Wear low-heeled shoes that fit well and give your feet good support. Talk to your doctor if you have foot problems that make this hard.  Carry a cellphone or wear a medical alert device that you can use to call for help.  Use stepladders instead of chairs to reach high objects. Don't climb if you're at risk for falls. Ask for help, if needed.  Wear the correct eyeglasses, if you need them.    Make your home safer.    Remove rugs, cords, clutter, and furniture from walkways.  Keep your house well lit. Use night-lights in hallways and bathrooms.  Install and use sturdy handrails on stairways.  Wear nonskid footwear, even inside. Don't walk barefoot or in socks without shoes.    Be safe outside.    Use handrails, curb cuts, and ramps whenever possible.  Keep your hands free by using a shoulder bag or backpack.  Try to walk in well-lit areas. Watch out for uneven ground, changes in pavement, and debris.  Be careful in the winter. Walk on the grass or gravel when sidewalks are slippery. Use de-icer on steps and walkways. Add non-slip devices to shoes.    Put grab bars and nonskid mats in your shower or tub and near the toilet. Try to use a shower chair or bath bench when bathing.   Get into a tub or shower by putting in your weaker leg first. Get out with your strong side first. Have a phone or medical alert device in the bathroom with you.   Where can you learn more?  Go to https://www.[employer].net/patiented  Enter G117 in the search box to learn more about \"Preventing Falls: Care Instructions.\"  Current as of: July 17, " 2023               Content Version: 14.0    6346-0487 [employer].   Care instructions adapted under license by your healthcare professional. If you have questions about a medical condition or this instruction, always ask your healthcare professional. [employer] disclaims any warranty or liability for your use of this information.      Hearing Loss: Care Instructions  Overview     Hearing loss is a sudden or slow decrease in how well you hear. It can range from slight to profound. Permanent hearing loss can occur with aging. It also can happen when you are exposed long-term to loud noise. Examples include listening to loud music, riding motorcycles, or being around other loud machines.  Hearing loss can affect your work and home life. It can make you feel lonely or depressed. You may feel that you have lost your independence. But hearing aids and other devices can help you hear better and feel connected to others.  Follow-up care is a key part of your treatment and safety. Be sure to make and go to all appointments, and call your doctor if you are having problems. It's also a good idea to know your test results and keep a list of the medicines you take.  How can you care for yourself at home?  Avoid loud noises whenever possible. This helps keep your hearing from getting worse.  Always wear hearing protection around loud noises.  Wear a hearing aid as directed.  A professional can help you pick a hearing aid that will work best for you.  You can also get hearing aids over the counter for mild to moderate hearing loss.  Have hearing tests as your doctor suggests. They can show whether your hearing has changed. Your hearing aid may need to be adjusted.  Use other devices as needed. These may include:  Telephone amplifiers and hearing aids that can connect to a television, stereo, radio, or microphone.  Devices that use lights or vibrations. These alert you to the doorbell, a ringing  "telephone, or a baby monitor.  Television closed-captioning. This shows the words at the bottom of the screen. Most new TVs can do this.  TTY (text telephone). This lets you type messages back and forth on the telephone instead of talking or listening. These devices are also called TDD. When messages are typed on the keyboard, they are sent over the phone line to a receiving TTY. The message is shown on a monitor.  Use text messaging, social media, and email if it is hard for you to communicate by telephone.  Try to learn a listening technique called speechreading. It is not lipreading. You pay attention to people's gestures, expressions, posture, and tone of voice. These clues can help you understand what a person is saying. Face the person you are talking to, and have them face you. Make sure the lighting is good. You need to see the other person's face clearly.  Think about counseling if you need help to adjust to your hearing loss.  When should you call for help?  Watch closely for changes in your health, and be sure to contact your doctor if:    You think your hearing is getting worse.     You have new symptoms, such as dizziness or nausea.   Where can you learn more?  Go to https://www.[employer].net/patiented  Enter R798 in the search box to learn more about \"Hearing Loss: Care Instructions.\"  Current as of: September 27, 2023               Content Version: 14.0    [id number] [employer].   Care instructions adapted under license by your healthcare professional. If you have questions about a medical condition or this instruction, always ask your healthcare professional. [employer] disclaims any warranty or liability for your use of this information.      Substance Use Disorder: Care Instructions  Overview     You can improve your life and health by stopping your use of alcohol or drugs. When you don't drink or use drugs, you may feel and sleep better. You may get along better with " your family, friends, and coworkers. There are medicines and programs that can help with substance use disorder.  How can you care for yourself at home?  Here are some ways to help you stay sober and prevent relapse.  If you have been given medicine to help keep you sober or reduce your cravings, be sure to take it exactly as prescribed.  Talk to your doctor about programs that can help you stop using drugs or drinking alcohol.  Do not keep alcohol or drugs in your home.  Plan ahead. Think about what you'll say if other people ask you to drink or use drugs. Try not to spend time with people who drink or use drugs.  Use the time and money spent on drinking or drugs to do something that's important to you.  Preventing a relapse  Have a plan to deal with relapse. Learn to recognize changes in your thinking that lead you to drink or use drugs. Get help before you start to drink or use drugs again.  Try to stay away from situations, friends, or places that may lead you to drink or use drugs.  If you feel the need to drink alcohol or use drugs again, seek help right away. Call a trusted friend or family member. Some people get support from organizations such as Narcotics Anonymous or [employer] or from treatment facilities.  If you relapse, get help as soon as you can. Some people make a plan with another person that outlines what they want that person to do for them if they relapse. The plan usually includes how to handle the relapse and who to notify in case of relapse.  Don't give up. Remember that a relapse doesn't mean that you have failed. Use the experience to learn the triggers that lead you to drink or use drugs. Then quit again. Recovery is a lifelong process. Many people have several relapses before they are able to quit for good.  Follow-up care is a key part of your treatment and safety. Be sure to make and go to all appointments, and call your doctor if you are having problems. It's also a good idea to  "know your test results and keep a list of the medicines you take.  When should you call for help?   Call 911  anytime you think you may need emergency care. For example, call if you or someone else:    Has overdosed or has withdrawal signs. Be sure to tell the emergency workers that you are or someone else is using or trying to quit using drugs. Overdose or withdrawal signs may include:  Losing consciousness.  Seizure.  Seeing or hearing things that aren't there (hallucinations).     Is thinking or talking about suicide or harming others.   Where to get help 24 hours a day, 7 days a week   If you or someone you know talks about suicide, self-harm, a mental health crisis, a substance use crisis, or any other kind of emotional distress, get help right away. You can:    Call the Suicide and Crisis Lifeline at 988.     Call 7-601-773-TALK (1-628.668.7783).     Text HOME to 739603 to access the Crisis Text Line.   Consider saving these numbers in your phone.  Go to [employer] for more information or to chat online.  Call your doctor now or seek immediate medical care if:    You are having withdrawal symptoms. These may include nausea or vomiting, sweating, shakiness, and anxiety.   Watch closely for changes in your health, and be sure to contact your doctor if:    You have a relapse.     You need more help or support to stop.   Where can you learn more?  Go to https://www.[employer].com.net/patiented  Enter H573 in the search box to learn more about \"Substance Use Disorder: Care Instructions.\"  Current as of: November 15, 2023               Content Version: 14.0    7722-2064 [employer].   Care instructions adapted under license by your healthcare professional. If you have questions about a medical condition or this instruction, always ask your healthcare professional. [employer] disclaims any warranty or liability for your use of this information.         "

## 2024-10-01 ENCOUNTER — TELEPHONE (OUTPATIENT)
Dept: UROLOGY | Facility: CLINIC | Age: 79
End: 2024-10-01
Payer: MEDICARE

## 2024-10-01 NOTE — TELEPHONE ENCOUNTER
"[facility] Call Center    Phone Message    May a detailed message be left on voicemail: no     Reason for Call: Other: Patient stating he needs faxes sent over to the company \"[employer]\" . Gorin call patient .     Action Taken: Other: [facility]    Travel Screening: Not Applicable     Date of Service:                                                                     "

## 2024-10-01 NOTE — TELEPHONE ENCOUNTER
Spoke with patient and advised him that the notes that he requested to be faxed were faxed on 9-6-24. [employer] called today requesting an addendum to the note from 10-23, we cannot do that to note that is almost a year old. They verbalized understanding.  Latasha Brizuela LPN

## 2024-10-02 ENCOUNTER — ALLIED HEALTH/NURSE VISIT (OUTPATIENT)
Dept: FAMILY MEDICINE | Facility: CLINIC | Age: 79
End: 2024-10-02
Payer: MEDICARE

## 2024-10-02 DIAGNOSIS — Z23 HIGH PRIORITY FOR 2019-NCOV VACCINE: Primary | ICD-10-CM

## 2024-10-02 PROCEDURE — 99207 PR NO CHARGE NURSE ONLY: CPT

## 2024-10-02 PROCEDURE — 91320 SARSCV2 VAC 30MCG TRS-SUC IM: CPT

## 2024-10-02 PROCEDURE — 90480 ADMN SARSCOV2 VAC 1/ONLY CMP: CPT

## 2024-10-12 ENCOUNTER — IMMUNIZATION (OUTPATIENT)
Dept: FAMILY MEDICINE | Facility: CLINIC | Age: 79
End: 2024-10-12
Payer: MEDICARE

## 2024-10-12 DIAGNOSIS — Z23 NEED FOR PROPHYLACTIC VACCINATION AND INOCULATION AGAINST INFLUENZA: Primary | ICD-10-CM

## 2024-10-12 PROCEDURE — 90471 IMMUNIZATION ADMIN: CPT

## 2024-10-12 PROCEDURE — 90662 IIV NO PRSV INCREASED AG IM: CPT

## 2024-10-29 ENCOUNTER — OFFICE VISIT (OUTPATIENT)
Dept: UROLOGY | Facility: CLINIC | Age: 79
End: 2024-10-29
Payer: MEDICARE

## 2024-10-29 VITALS
HEIGHT: 66 IN | DIASTOLIC BLOOD PRESSURE: 71 MMHG | WEIGHT: 166 LBS | BODY MASS INDEX: 26.68 KG/M2 | SYSTOLIC BLOOD PRESSURE: 144 MMHG

## 2024-10-29 DIAGNOSIS — N31.2 ATONIC BLADDER: Primary | ICD-10-CM

## 2024-10-29 DIAGNOSIS — N18.2 CKD (CHRONIC KIDNEY DISEASE) STAGE 2, GFR 60-89 ML/MIN: ICD-10-CM

## 2024-10-29 DIAGNOSIS — R39.89 SUSPECTED UTI: ICD-10-CM

## 2024-10-29 LAB
ALBUMIN UR-MCNC: NEGATIVE MG/DL
APPEARANCE UR: ABNORMAL
BILIRUB UR QL STRIP: NEGATIVE
COLOR UR AUTO: YELLOW
GLUCOSE UR STRIP-MCNC: NEGATIVE MG/DL
HGB UR QL STRIP: NEGATIVE
KETONES UR STRIP-MCNC: NEGATIVE MG/DL
LEUKOCYTE ESTERASE UR QL STRIP: ABNORMAL
NITRATE UR QL: POSITIVE
PH UR STRIP: 5.5 [PH] (ref 5–7)
SP GR UR STRIP: 1.01 (ref 1–1.03)
UROBILINOGEN UR STRIP-ACNC: 0.2 E.U./DL

## 2024-10-29 PROCEDURE — 87086 URINE CULTURE/COLONY COUNT: CPT | Performed by: STUDENT IN AN ORGANIZED HEALTH CARE EDUCATION/TRAINING PROGRAM

## 2024-10-29 PROCEDURE — 99213 OFFICE O/P EST LOW 20 MIN: CPT | Performed by: STUDENT IN AN ORGANIZED HEALTH CARE EDUCATION/TRAINING PROGRAM

## 2024-10-29 PROCEDURE — 81003 URINALYSIS AUTO W/O SCOPE: CPT | Mod: QW | Performed by: STUDENT IN AN ORGANIZED HEALTH CARE EDUCATION/TRAINING PROGRAM

## 2024-10-29 PROCEDURE — 87186 SC STD MICRODIL/AGAR DIL: CPT | Performed by: STUDENT IN AN ORGANIZED HEALTH CARE EDUCATION/TRAINING PROGRAM

## 2024-10-29 ASSESSMENT — PAIN SCALES - GENERAL: PAINLEVEL_OUTOF10: MILD PAIN (2)

## 2024-10-29 NOTE — LETTER
"10/29/2024       RE: Sushant Zepeda  [address]  [address] [place] [id number]     Dear Colleague,    Thank you for referring your patient, Sushant Zepeda, to the [facility] [place] [facility]. Please see a copy of my visit note below.    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      He has managed to stay out of the hospital for the past year and has avoided any further UTI    Patient is asymptomatic. Catheterizing well, up to 4 times a day. He has developed a ritual of cleanliness to try to avoid infections    PHYSICAL EXAM  Patient is a 79 year old  male   Vitals: Blood pressure (!) 144/71, height 1.664 m (5' 5.5\"), weight 75.3 kg (166 lb).  Body mass index is 27.2 kg/m .  General Appearance Adult:   Alert, no acute distress, oriented  HENT: throat/mouth:normal, good dentition  Lungs: no respiratory distress, or pursed lip breathing  Heart: No obvious jugular venous distension present  Abdomen: nondistended  Musculoskeltal: extremities normal, no peripheral edema  Skin: no suspicious lesions or rashes  Neuro: Alert, oriented, speech and mentation normal  Psych: affect and mood normal  Gait: Normal  : deferred      Component      Latest Ref Rng 10/29/2024  9:53 AM   Color Urine      Colorless, Straw, Light Yellow, Yellow  Yellow    Appearance Urine      Clear  Cloudy !    Glucose Urine      Negative mg/dL Negative    Bilirubin Urine      Negative  Negative    Ketones Urine      Negative mg/dL Negative    Specific Gravity Urine      1.003 - 1.035  1.015    Blood Urine      Negative  Negative    pH Urine      5.0 - 7.0  5.5    Protein Albumin Urine      Negative mg/dL " Negative    Urobilinogen Urine      0.2, 1.0 E.U./dL 0.2    Nitrite Urine      Negative  Positive !    Leukocyte Esterase Urine      Negative  Small !       Legend:  ! 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      Doing well from cath standpoint  CKD is stable    Positive nitrite, suspect bacteruria. He denies any dysuria. No fever or chills. No pain with catheterization. 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 We will send a culture nonetheless to be prepared in case he becomes symptomatic    - continue to catheterize 4 times a day at least, indefinitely  - contact office if develops UTI symptoms, then should treat with abx  - return 1 year with UA       Ramin Mo MD   [facility]  [facility] Phone: [phone]      Again, thank you for allowing me to participate in the care of your patient.      Sincerely,    Ramin Mo MD

## 2024-10-29 NOTE — PROGRESS NOTES
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      He has managed to stay out of the hospital for the past year and has avoided any further UTI    Patient is asymptomatic. Catheterizing well, up to 4 times a day. He has developed a ritual of cleanliness to try to avoid infections    PHYSICAL EXAM  Patient is a 79 year old  male   Vitals: Blood pressure (!) 144/71, height 1.664 m (5' 5.5\"), weight 75.3 kg (166 lb).  Body mass index is 27.2 kg/m .  General Appearance Adult:   Alert, no acute distress, oriented  HENT: throat/mouth:normal, good dentition  Lungs: no respiratory distress, or pursed lip breathing  Heart: No obvious jugular venous distension present  Abdomen: nondistended  Musculoskeltal: extremities normal, no peripheral edema  Skin: no suspicious lesions or rashes  Neuro: Alert, oriented, speech and mentation normal  Psych: affect and mood normal  Gait: Normal  : deferred      Component      Latest Ref Rng 10/29/2024  9:53 AM   Color Urine      Colorless, Straw, Light Yellow, Yellow  Yellow    Appearance Urine      Clear  Cloudy !    Glucose Urine      Negative mg/dL Negative    Bilirubin Urine      Negative  Negative    Ketones Urine      Negative mg/dL Negative    Specific Gravity Urine      1.003 - 1.035  1.015    Blood Urine      Negative  Negative    pH Urine      5.0 - 7.0  5.5    Protein Albumin Urine      Negative mg/dL Negative    Urobilinogen Urine      0.2, 1.0 E.U./dL 0.2    Nitrite Urine      Negative  Positive !    Leukocyte Esterase Urine      Negative  Small !       Legend:  ! 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      Doing well " from cath standpoint  CKD is stable    Positive nitrite, suspect bacteruria. He denies any dysuria. No fever or chills. No pain with catheterization. 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 We will send a culture nonetheless to be prepared in case he becomes symptomatic    - continue to catheterize 4 times a day at least, indefinitely  - contact office if develops UTI symptoms, then should treat with abx  - return 1 year with UA       Ramin Mo MD   [facility]  [facility] Phone: [phone]

## 2024-10-29 NOTE — NURSING NOTE
"Chief Complaint   Patient presents with    Atonic bladder     Yearly follow up     Pt states catheterizing is going well, caths 3-5 times daily.  No current UTI symptoms.    Pt currently unhappy with \"[employer] catheters\" will take home some Rosa M and Cure catheters.      Stephanie Ortega, Clinic Assistant    "

## 2024-10-30 LAB — BACTERIA UR CULT: ABNORMAL

## 2024-11-07 NOTE — NURSING NOTE
Sushant Zepeda comes into clinic today for Urinary Retention  to place an order for more catheters.    Pt is unhappy with his current catheter brand and catheter supplier and would like to try ordering through [facility].  Pt recently tested a couple different catheters and likes the 14 Fr coude Cure hydrophilic.    Pt has chronic urinary retention requiring self intermittent catheterization 4 times daily.  Due to enlarged prostate, patient cannot pass a straight catheter and requires a coude-tip catheter.  Per Dr. Mo, patient should perform SIC 4 times daily.    Order will be placed through [facility] for 14 Fr Cure Hydrophilic catheters.    This service provided today was under the supervising provider of the day VENUS Thomas, who was available if needed.    Stephanie Ortega

## 2024-11-26 ENCOUNTER — TELEPHONE (OUTPATIENT)
Dept: UROLOGY | Facility: CLINIC | Age: 79
End: 2024-11-26
Payer: MEDICARE

## 2024-11-26 NOTE — TELEPHONE ENCOUNTER
[facility] Call Center    Phone Message    May a detailed message be left on voicemail: yes     Reason for Call: Other: pt calling and has not heard anything for cath supplies, he needs to have this taken care of, please reach out to pt     Action Taken: Other: urology    Travel Screening: Not Applicable     Date of Service:

## 2024-12-05 ENCOUNTER — TELEPHONE (OUTPATIENT)
Dept: UROLOGY | Facility: CLINIC | Age: 79
End: 2024-12-05
Payer: MEDICARE

## 2024-12-05 DIAGNOSIS — E11.9 TYPE 2 DIABETES, HBA1C GOAL < 8% (H): ICD-10-CM

## 2024-12-05 NOTE — TELEPHONE ENCOUNTER
[facility] Call Center    Phone Message    May a detailed message be left on voicemail: yes     Reason for Call: Other: They received the 10/29/24 nurse notes from office visit but need Dr Mo to sign off. Please have him sign off and fax [phone], in order to validate the information. Thank you!     Action Taken: Message routed to:  [facility]    Travel Screening: Not Applicable     Date of Service:

## 2024-12-11 ENCOUNTER — TELEPHONE (OUTPATIENT)
Dept: UROLOGY | Facility: CLINIC | Age: 79
End: 2024-12-11
Payer: MEDICARE

## 2024-12-11 NOTE — TELEPHONE ENCOUNTER
[facility] Call Center    Phone Message    May a detailed message be left on voicemail: yes     Reason for Call: Other: Patient called in and states the new supply company for his catheters has not received the forms from the clinic. Patient is asking for a call back to further discuss.      Action Taken: Other: Urology    Travel Screening: Not Applicable

## 2025-02-17 ENCOUNTER — OFFICE VISIT (OUTPATIENT)
Dept: DERMATOLOGY | Facility: CLINIC | Age: 80
End: 2025-02-17
Payer: MEDICARE

## 2025-02-17 DIAGNOSIS — L73.9 FOLLICULITIS: ICD-10-CM

## 2025-02-17 DIAGNOSIS — L57.8 ACTINIC SKIN DAMAGE: Primary | ICD-10-CM

## 2025-02-17 DIAGNOSIS — L81.4 LENTIGINES: ICD-10-CM

## 2025-02-17 PROCEDURE — 99204 OFFICE O/P NEW MOD 45 MIN: CPT | Performed by: STUDENT IN AN ORGANIZED HEALTH CARE EDUCATION/TRAINING PROGRAM

## 2025-02-17 NOTE — PROGRESS NOTES
[facility] Dermatology Note    Encounter Date: Feb 17, 2025    Dermatology Problem List:    ______________________________________    Impression/Plan:  Sushant was seen today for derm problem.    Diagnoses and all orders for this visit:    Actinic skin damage  Lentigines  - Reviewed the compounding benefits of incremental changes to sun protective clothing behaviors including increased frequency of sunscreen and sun protective clothing like broad brimmed hats and longsleeved UPF containing clothing    Folliculitis  - declines topical abx      Follow-up PRN.  Gave him our business card and encouraged him to reach out if he has any new issues.      Staff Involved:  Staff Only    Shayan Montes MD   of Dermatology  Department of Dermatology  [facility]      CC:   Chief Complaint   Patient presents with    Derm Problem     Lesions-2 on the left forearm-asymptomatic-treated with liquid nitrogen previously       History of Present Illness:  Mr. Sushant Zepeda is a 79 year old male who presents as a new patient.    Pt presents today for concerns about spots on trunk and extremities.  Had 2 spots on his forearm that were evaluated by the primary care doctor and frozen with liquid nitrogen.  These areas seem to respond well.      Labs:      Physical exam:  Vitals: There were no vitals taken for this visit.  GEN: well developed, well-nourished, in no acute distress, in a pleasant mood.     SKIN: Sylvester phototype 1  - Sun-exposed skin, which includes the head/face, neck, both arms, digits, and/or nails was examined.   - Flat brown macules and patches in a sun exposed areas on face and extremities  - beata follicular erythema w/ some pustules scattered on scalp  - No other lesions of concern on areas examined.     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    OPTICAL TRACKING SYSTEM FUSION SPINE POSTERIOR LUMBAR THREE+ LEVELS N/A 2/14/2023    Procedure: L3 to LS1 Transforaminal Lumbar Interbody Fusion  L5 to S1 Smith-Lewis osteotomy via bilateral L5 to S1 facetectomys Central laminectomy, L3, L4, L5 and S1 Posterior instrumentation using a bilateral pedicle screw and vlad construct spanning from L3 to S1.;  Surgeon: Lam Castellanos MD;  Location:  OR    Z APPENDECTOMY      ZZC TOTAL HIP ARTHROPLASTY      Hip Replacement, Total       Social History:   reports that he has never smoked. He has never been exposed to tobacco smoke. He has never used smokeless tobacco. He reports current alcohol use. He reports that he does not use drugs.    Family History:  Family History   Problem Relation Age of Onset    Diabetes Mother     Heart Disease Mother     Eye Disorder Mother     Cerebrovascular Disease Father     Musculoskeletal Disorder Sister     Prostate Cancer No family hx of     Cancer - colorectal No family hx of        Medications:  Current Outpatient Medications   Medication Sig Dispense Refill    atorvastatin (LIPITOR) 40 MG tablet Take 1 tablet (40 mg) by mouth daily. 90 tablet 4    blood glucose (CONTOUR NEXT TEST) test strip Use to  test blood sugar 1 times daily or as directed. 100 strip 1    blood glucose (NO BRAND SPECIFIED) lancets standard Use to test blood sugar 1 times daily or as directed. 100 lancet 3    blood glucose monitoring (NO BRAND SPECIFIED) meter device kit Use to test blood sugar 1 times daily or as directed. 1 kit 0    EPINEPHrine (ANY BX GENERIC EQUIV) 0.3 MG/0.3ML injection 2-pack INJECT 0.3 MG IN THE MUSCLE ONCE AS NEEDED FOR ALLERGIC STATE 2 each 0    lisinopril (ZESTRIL) 20 MG tablet Take 1 tablet (20 mg) by mouth daily. 90 tablet 4    metFORMIN (GLUCOPHAGE XR) 500 MG 24 hr tablet Take 1 tablet (500 mg) by mouth daily (with dinner). 90 tablet 1    MULTI-VITAMIN OR TABS 1 tablet daily       Allergies   Allergen Reactions    Nsaids      aleve, ibuprofen     hives and swelling

## 2025-02-17 NOTE — LETTER
2/17/2025      Sushant Zepeda  [address]  [address]  [place] [facility] [place] [facility] [address]      Dear Colleague,    Thank you for referring your patient, Sushant Zepeda, to the [facility]. Please see a copy of my visit note below.    [facility] Dermatology Note    Encounter Date: Feb 17, 2025    Dermatology Problem List:    ______________________________________    Impression/Plan:  Sushant was seen today for derm problem.    Diagnoses and all orders for this visit:    Actinic skin damage  Lentigines  - Reviewed the compounding benefits of incremental changes to sun protective clothing behaviors including increased frequency of sunscreen and sun protective clothing like broad brimmed hats and longsleeved UPF containing clothing    Folliculitis  - declines topical abx      Follow-up PRN.  Gave him our business card and encouraged him to reach out if he has any new issues.      Staff Involved:  Staff Only    Shayan Montes MD   of Dermatology  Department of Dermatology  [facility]      CC:   Chief Complaint   Patient presents with     Derm Problem     Lesions-2 on the left forearm-asymptomatic-treated with liquid nitrogen previously       History of Present Illness:  Mr. Sushant Zepeda is a 79 year old male who presents as a new patient.    Pt presents today for concerns about spots on trunk and extremities.  Had 2 spots on his forearm that were evaluated by the primary care doctor and frozen with liquid nitrogen.  These areas seem to respond well.      Labs:      Physical exam:  Vitals: There were no vitals taken for this visit.  GEN: well developed, well-nourished, in no acute distress, in a pleasant mood.     SKIN: Sylvester phototype 1  - Sun-exposed skin, which includes the head/face, neck, both arms, digits, and/or nails was examined.   - Flat brown macules and patches in a sun exposed areas on face and  extremities  - beata follicular erythema w/ some pustules scattered on scalp  - No other lesions of concern on areas examined.     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     OPTICAL TRACKING SYSTEM FUSION SPINE POSTERIOR LUMBAR THREE+ LEVELS N/A 2/14/2023    Procedure: L3 to LS1 Transforaminal Lumbar Interbody Fusion  L5 to S1 Smith-Lewis osteotomy via bilateral L5 to S1 facetectomys Central laminectomy, L3, L4, L5 and S1 Posterior instrumentation using a bilateral pedicle screw and vlad construct spanning from L3 to S1.;  Surgeon: Lam Castellanos MD;  Location:  OR     [facility] APPENDECTOMY       [facility] TOTAL HIP ARTHROPLASTY      Hip Replacement, Total       Social History:   reports that he has never smoked. He has never been exposed to tobacco smoke. He has never used smokeless tobacco. He reports current alcohol use. He reports that he does not use drugs.    Family History:  Family History   Problem Relation Age of Onset     Diabetes Mother      Heart Disease Mother      Eye Disorder Mother      Cerebrovascular Disease Father      Musculoskeletal Disorder Sister      Prostate  Cancer No family hx of      Cancer - colorectal No family hx of        Medications:  Current Outpatient Medications   Medication Sig Dispense Refill     atorvastatin (LIPITOR) 40 MG tablet Take 1 tablet (40 mg) by mouth daily. 90 tablet 4     blood glucose (CONTOUR NEXT TEST) test strip Use to test blood sugar 1 times daily or as directed. 100 strip 1     blood glucose (NO BRAND SPECIFIED) lancets standard Use to test blood sugar 1 times daily or as directed. 100 lancet 3     blood glucose monitoring (NO BRAND SPECIFIED) meter device kit Use to test blood sugar 1 times daily or as directed. 1 kit 0     EPINEPHrine (ANY BX GENERIC EQUIV) 0.3 MG/0.3ML injection 2-pack INJECT 0.3 MG IN THE MUSCLE ONCE AS NEEDED FOR ALLERGIC STATE 2 each 0     lisinopril (ZESTRIL) 20 MG tablet Take 1 tablet (20 mg) by mouth daily. 90 tablet 4     metFORMIN (GLUCOPHAGE XR) 500 MG 24 hr tablet Take 1 tablet (500 mg) by mouth daily (with dinner). 90 tablet 1     MULTI-VITAMIN OR TABS 1 tablet daily       Allergies   Allergen Reactions     Nsaids      aleve, ibuprofen     hives and swelling               Again, thank you for allowing me to participate in the care of your patient.        Sincerely,        Shayan Montes MD    Electronically signed

## 2025-03-10 ENCOUNTER — OFFICE VISIT (OUTPATIENT)
Dept: FAMILY MEDICINE | Facility: CLINIC | Age: 80
End: 2025-03-10
Payer: MEDICARE

## 2025-03-10 VITALS
OXYGEN SATURATION: 98 % | TEMPERATURE: 97.4 F | HEART RATE: 76 BPM | HEIGHT: 66 IN | BODY MASS INDEX: 27.8 KG/M2 | DIASTOLIC BLOOD PRESSURE: 66 MMHG | WEIGHT: 173 LBS | SYSTOLIC BLOOD PRESSURE: 125 MMHG | RESPIRATION RATE: 20 BRPM

## 2025-03-10 DIAGNOSIS — E78.5 HYPERLIPIDEMIA LDL GOAL <100: ICD-10-CM

## 2025-03-10 DIAGNOSIS — E11.9 TYPE 2 DIABETES MELLITUS WITHOUT COMPLICATION, WITHOUT LONG-TERM CURRENT USE OF INSULIN (H): Primary | ICD-10-CM

## 2025-03-10 DIAGNOSIS — N18.2 SECONDARY DIABETES MELLITUS WITH STAGE 2 CHRONIC KIDNEY DISEASE (H): ICD-10-CM

## 2025-03-10 DIAGNOSIS — E13.22 SECONDARY DIABETES MELLITUS WITH STAGE 2 CHRONIC KIDNEY DISEASE (H): ICD-10-CM

## 2025-03-10 LAB
ALBUMIN SERPL BCG-MCNC: 4.2 G/DL (ref 3.5–5.2)
ALP SERPL-CCNC: 103 U/L (ref 40–150)
ALT SERPL W P-5'-P-CCNC: 19 U/L (ref 0–70)
AST SERPL W P-5'-P-CCNC: 22 U/L (ref 0–45)
BILIRUB DIRECT SERPL-MCNC: 0.22 MG/DL (ref 0–0.3)
BILIRUB SERPL-MCNC: 0.5 MG/DL
CREAT UR-MCNC: 84.7 MG/DL
EST. AVERAGE GLUCOSE BLD GHB EST-MCNC: 140 MG/DL
HBA1C MFR BLD: 6.5 % (ref 0–5.6)
HOLD SPECIMEN: NORMAL
MICROALBUMIN UR-MCNC: 47.3 MG/L
MICROALBUMIN/CREAT UR: 55.84 MG/G CR (ref 0–17)
PROT SERPL-MCNC: 6.9 G/DL (ref 6.4–8.3)

## 2025-03-10 PROCEDURE — 99214 OFFICE O/P EST MOD 30 MIN: CPT

## 2025-03-10 PROCEDURE — 3074F SYST BP LT 130 MM HG: CPT

## 2025-03-10 PROCEDURE — 80076 HEPATIC FUNCTION PANEL: CPT

## 2025-03-10 PROCEDURE — 36415 COLL VENOUS BLD VENIPUNCTURE: CPT

## 2025-03-10 PROCEDURE — 82043 UR ALBUMIN QUANTITATIVE: CPT

## 2025-03-10 PROCEDURE — G2211 COMPLEX E/M VISIT ADD ON: HCPCS

## 2025-03-10 PROCEDURE — 83036 HEMOGLOBIN GLYCOSYLATED A1C: CPT

## 2025-03-10 PROCEDURE — 82570 ASSAY OF URINE CREATININE: CPT

## 2025-03-10 PROCEDURE — 3078F DIAST BP <80 MM HG: CPT

## 2025-03-10 RX ORDER — METFORMIN HYDROCHLORIDE 500 MG/1
500 TABLET, EXTENDED RELEASE ORAL
Qty: 90 TABLET | Refills: 1 | Status: SHIPPED | OUTPATIENT
Start: 2025-03-10

## 2025-03-10 ASSESSMENT — ANXIETY QUESTIONNAIRES
5. BEING SO RESTLESS THAT IT IS HARD TO SIT STILL: NOT AT ALL
3. WORRYING TOO MUCH ABOUT DIFFERENT THINGS: NOT AT ALL
1. FEELING NERVOUS, ANXIOUS, OR ON EDGE: NOT AT ALL
6. BECOMING EASILY ANNOYED OR IRRITABLE: NOT AT ALL
GAD7 TOTAL SCORE: 0
2. NOT BEING ABLE TO STOP OR CONTROL WORRYING: NOT AT ALL
IF YOU CHECKED OFF ANY PROBLEMS ON THIS QUESTIONNAIRE, HOW DIFFICULT HAVE THESE PROBLEMS MADE IT FOR YOU TO DO YOUR WORK, TAKE CARE OF THINGS AT HOME, OR GET ALONG WITH OTHER PEOPLE: NOT DIFFICULT AT ALL
GAD7 TOTAL SCORE: 0
7. FEELING AFRAID AS IF SOMETHING AWFUL MIGHT HAPPEN: NOT AT ALL

## 2025-03-10 ASSESSMENT — PATIENT HEALTH QUESTIONNAIRE - PHQ9: 5. POOR APPETITE OR OVEREATING: NOT AT ALL

## 2025-03-10 NOTE — PROGRESS NOTES
"  Assessment & Plan     (E11.9) Type 2 diabetes mellitus without complication, without long-term current use of insulin (H)  (primary encounter diagnosis)    (E13.22,  N18.2) 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)  No concerns. Well managed with metformin. No polyuria or polydipsia, no changes to sensation on lower extremities.   Plan: metFORMIN (GLUCOPHAGE XR) 500 MG 24 hr tablet,         Hepatic panel (Albumin, ALT, AST, Bili, Alk         Phos, TP)       (E78.5) Hyperlipidemia LDL goal <100  Well controlled with use of atorvastatin. Not due for cholesterol check at this time, will get with physical in August. No new side effects of the medication. Refill provided. Will continue to monitor.  Plan: Hepatic panel (Albumin, ALT, AST, Bili, Alk         Phos, TP)    The longitudinal plan of care for the diagnosis(es)/condition(s) as documented were addressed during this visit. Due to the added complexity in care, I will continue to support Sushant in the subsequent management and with ongoing continuity of care.    BMI  Estimated body mass index is 28.35 kg/m  as calculated from the following:    Height as of this encounter: 1.664 m (5' 5.5\").    Weight as of this encounter: 78.5 kg (173 lb).   Weight management plan: Discussed healthy diet and exercise guidelines      Follow up in 6 months for physical; sooner with any acute concerns.    Lilia Canchola is a 79 year old, presenting for the following health issues:  Diabetes        3/10/2025     9:13 AM   Additional Questions   Roomed by Lynne SMITH     History of Present Illness       Diabetes:   He presents for follow up of diabetes.  He is checking home blood glucose one time daily.   He checks blood glucose before meals.  Blood glucose is never over 200 and never under 70. He is aware of hypoglycemia symptoms including shakiness and weakness.    He has no concerns regarding his diabetes at this time.  He is having excessive thirst.    "         He eats 2-3 servings of fruits and vegetables daily.He consumes 0 sweetened beverage(s) daily.He exercises with enough effort to increase his heart rate 9 or less minutes per day.  He exercises with enough effort to increase his heart rate 3 or less days per week.   He is taking medications regularly.    Sushant is doing well, no concerns regarding his health except an occasional right calf cramp that occurs when sleeping. This improves with moving his heel and he uses CBD/THC gummies before bed to aid in sleep with this pain.     Diabetes Follow-up  How often are you checking your blood sugar? One time daily  What time of day are you checking your blood sugars (select all that apply)?  Before meals  Have you had any blood sugars above 200?  No  Have you had any blood sugars below 70?  No  What symptoms do you notice when your blood sugar is low?  Shaky and Weak  What concerns do you have today about your diabetes? None   Do you have any of these symptoms? (Select all that apply)  Excessive thirst  -has noticed sugars have been more controlled recently with better dietary changes. Morning fasting sugars have been in the 110-120's most days.    BP Readings from Last 2 Encounters:   03/10/25 125/66   10/29/24 (!) 144/71     Hemoglobin A1C (%)   Date Value   03/10/2025 6.5 (H)   08/28/2024 7.2 (H)   02/22/2021 6.8 (H)   08/26/2020 6.7 (H)     LDL Cholesterol Calculated (mg/dL)   Date Value   09/10/2024 38   08/24/2023 39   08/26/2020 68   04/04/2019 72     How many servings of fruits and vegetables do you eat daily?  2-3  On average, how many sweetened beverages do you drink each day (Examples: soda, juice, sweet tea, etc.  Do NOT count diet or artificially sweetened beverages)?   0  How many days per week do you exercise enough to make your heart beat faster? 3 or less  How many minutes a day do you exercise enough to make your heart beat faster? 9 or less  How many days per week do you miss taking your  "medication? 0      Review of Systems  Constitutional, HEENT, cardiovascular, pulmonary, gi and gu systems are negative, except as otherwise noted.        Objective    /66 (BP Location: Right arm, Patient Position: Sitting, Cuff Size: Adult Large)   Pulse 76   Temp 97.4  F (36.3  C) (Oral)   Resp 20   Ht 1.664 m (5' 5.5\")   Wt 78.5 kg (173 lb)   SpO2 98%   BMI 28.35 kg/m    Body mass index is 28.35 kg/m .  Physical Exam   GENERAL: alert and no distress  EYES: Eyes grossly normal to inspection, conjunctivae and sclerae normal  RESP: lungs clear to auscultation - no rales, rhonchi or wheezes  CV: regular rate and rhythm, normal S1 S2, no S3 or S4, no murmur  MS: no gross musculoskeletal defects noted      Signed Electronically by: Rabia Moore PA-C    "

## 2025-04-26 ENCOUNTER — APPOINTMENT (OUTPATIENT)
Dept: GENERAL RADIOLOGY | Facility: CLINIC | Age: 80
End: 2025-04-26
Attending: PHYSICIAN ASSISTANT
Payer: MEDICARE

## 2025-04-26 ENCOUNTER — APPOINTMENT (OUTPATIENT)
Dept: CT IMAGING | Facility: CLINIC | Age: 80
End: 2025-04-26
Attending: PHYSICIAN ASSISTANT
Payer: MEDICARE

## 2025-04-26 ENCOUNTER — HOSPITAL ENCOUNTER (EMERGENCY)
Facility: CLINIC | Age: 80
Discharge: HOME | End: 2025-04-26
Attending: PHYSICIAN ASSISTANT | Admitting: PHYSICIAN ASSISTANT
Payer: MEDICARE

## 2025-04-26 VITALS
SYSTOLIC BLOOD PRESSURE: 128 MMHG | OXYGEN SATURATION: 98 % | DIASTOLIC BLOOD PRESSURE: 64 MMHG | HEART RATE: 71 BPM | TEMPERATURE: 98.6 F | RESPIRATION RATE: 16 BRPM
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
## 2025-04-26 DIAGNOSIS — M54.50 ACUTE RIGHT-SIDED LOW BACK PAIN WITHOUT SCIATICA: ICD-10-CM

## 2025-04-26 PROCEDURE — 72131 CT LUMBAR SPINE W/O DYE: CPT

## 2025-04-26 PROCEDURE — 99284 EMERGENCY DEPT VISIT MOD MDM: CPT | Mod: 25

## 2025-04-26 PROCEDURE — 250N000013 HC RX MED GY IP 250 OP 250 PS 637: Performed by: PHYSICIAN ASSISTANT

## 2025-04-26 PROCEDURE — 72170 X-RAY EXAM OF PELVIS: CPT

## 2025-04-26 RX ORDER — OXYCODONE HYDROCHLORIDE 5 MG/1
5 TABLET ORAL ONCE
Status: COMPLETED | OUTPATIENT
Start: 2025-04-26 | End: 2025-04-26

## 2025-04-26 RX ORDER — OXYCODONE HYDROCHLORIDE 5 MG/1
5 TABLET ORAL EVERY 6 HOURS PRN
Qty: 10 TABLET | Refills: 0 | Status: SHIPPED | OUTPATIENT
Start: 2025-04-26

## 2025-04-26 RX ADMIN — OXYCODONE HYDROCHLORIDE 5 MG: 5 TABLET ORAL at 17:13

## 2025-04-26 ASSESSMENT — ACTIVITIES OF DAILY LIVING (ADL)
ADLS_ACUITY_SCORE: 52

## 2025-04-26 ASSESSMENT — COLUMBIA-SUICIDE SEVERITY RATING SCALE - C-SSRS
2. HAVE YOU ACTUALLY HAD ANY THOUGHTS OF KILLING YOURSELF IN THE PAST MONTH?: NO
1. IN THE PAST MONTH, HAVE YOU WISHED YOU WERE DEAD OR WISHED YOU COULD GO TO SLEEP AND NOT WAKE UP?: NO
6. HAVE YOU EVER DONE ANYTHING, STARTED TO DO ANYTHING, OR PREPARED TO DO ANYTHING TO END YOUR LIFE?: NO

## 2025-04-26 NOTE — ED TRIAGE NOTES
Patient has chronic lower back pain with a surgical hx. Patient was twisting side to side yesterday and strained his back. His wife tripped over his legs today and caused him to twist around to check on her and injured his back further.

## 2025-04-26 NOTE — ED PROVIDER NOTES
Emergency Department Note      History of Present Illness     Chief Complaint   Back Pain      HPI   Sushant Zepeda is a 79 year old male with a history of chronic back pain, s/p L3-S1 fusion/surgery, who presents to the ED for evaluation of back pain. Patient notes that he was sitting on bleachers yesterday when the chair that he was sitting on slipped forward, causing him to fall forward onto his left knee. He notes sudden onset of severe right sided low back pain at that time. He denies hitting his head, no LOC. No pain elsewhere at that time. He was able to get up and manage symptoms without medication last evening. Today, after his wife tripped over his legs, he made a sudden rotation of his torso to the left and had severe worsening of pain to his right low back. No numbness or tingling. No saddle anesthesia. No urinary symptoms, he catheterizes at baseline. No bowel incontinence. No fevers or chills. No rash.    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    Mumps     Renal disease     Unspecified essential hypertension        Medications   oxyCODONE (ROXICODONE) 5 MG tablet  atorvastatin (LIPITOR) 40 MG tablet  blood glucose (CONTOUR NEXT TEST) test strip  blood glucose (NO BRAND SPECIFIED) lancets standard  blood glucose monitoring (NO BRAND SPECIFIED) meter device kit  EPINEPHrine (ANY BX GENERIC EQUIV) 0.3 MG/0.3ML injection 2-pack  lisinopril (ZESTRIL) 20 MG tablet  metFORMIN (GLUCOPHAGE XR) 500 MG 24 hr tablet  MULTI-VITAMIN OR TABS        Surgical History   Past Surgical History:   Procedure Laterality Date     COLONOSCOPY N/A 3/2/2017    Procedure: COMBINED COLONOSCOPY, SINGLE OR MULTIPLE BIOPSY/POLYPECTOMY BY BIOPSY;  Surgeon: Ru Deal MD;  Location:  GI    CYSTOSCOPY, TRANSURETHRAL RESECTION (TUR) PROSTATE, COMBINED Left 1/8/2019    Procedure: Video cystopanendoscopy, left retrograde ureterogram and pyelogram, exam under anesthesia;  Surgeon: Addy Sofia MD;  Location: [facility] OR    HC REMOVE TONSILS/ADENOIDS,<11 Y/O      T & A <12y.o.    OPTICAL TRACKING SYSTEM FUSION SPINE POSTERIOR LUMBAR THREE+ LEVELS N/A 2/14/2023    Procedure: L3 to LS1 Transforaminal Lumbar Interbody Fusion  L5 to S1 Smith-Lewis osteotomy via bilateral L5 to S1 facetectomys Central laminectomy, L3, L4, L5 and S1 Posterior instrumentation using a bilateral pedicle screw and vlad construct spanning from L3 to S1.;  Surgeon: Lam Castellanos MD;  Location: [facility] OR    ZZC APPENDECTOMY      ZZC TOTAL HIP ARTHROPLASTY      Hip Replacement, Total       Physical Exam     Patient Vitals for the past 24 hrs:   BP Temp Temp src Pulse Resp SpO2   04/26/25 1923 128/64 -- -- 71 -- 98 %   04/26/25 1813 125/65 -- -- 65 -- 99 %   04/26/25 1651 (!) 159/67 -- -- -- -- --   04/26/25 1650 -- 98.6  F (37  C) Temporal 73 16 99 %     Physical Exam  Constitutional: Pleasant. Cooperative.   Eyes: Pupils equally round and reactive  HENT: No scalp hematoma. No scalp tenderness. No bony step-off or crepitus. No facial bone tenderness or instability. No periorbital ecchymosis or Cardoza signs. Oropharynx is normal with moist mucus membranes. No evidence for intraoral injury.  Cardiovascular: Regular rate and rhythm and without murmurs.  Respiratory: Normal respiratory effort, lungs are clear bilaterally.  GI: Abdomen is soft, non-tender, non-distended. No guarding, rebound, or rigidity.  Musculoskeletal: No midline cervical, thoracic, or lumbar tenderness. TTP to right low back in paraspinal L spine region. Normal ROM of the neck. No clavicular tenderness. No  upper extremity tenderness. No lower extremity tenderness. Normal ROM of extremities. No tenderness with compression of the rib cage or pelvis.  Skin: No rashes. No lacerations or abrasions noted.  Neurologic: Cranial nerves grossly intact, normal cognition, no focal deficits. Alert and oriented x 3. GCS 15  Psychiatric: Normal affect.  Nursing notes and vital signs reviewed.    Diagnostics     Lab Results   Labs Ordered and Resulted from Time of ED Arrival to Time of ED Departure - No data to display    Imaging   XR Pelvis 1/2 Views   Final Result   IMPRESSION:    1.  Postoperative changes in the visualized spine.   2.  Osteoarthrosis of the SI joints, right greater than left.   3.  Moderate osteoarthrosis of the left hip.   4.  Right total hip arthroplasty. Mild asymmetric spacer wear, but no evidence of osteolysis or hardware loosening. There is no evidence of an acute periprosthetic fracture. Moderate amount of heterotopic ossification superolateral to the hip.      CT Lumbar Spine w/o Contrast   Final Result   IMPRESSION:   1.  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   2.  No acute fracture or posttraumatic subluxation.   3.  Postoperative and spondylotic changes, as described. No evidence of hardware failure.        Independent Interpretation   None    ED Course      Medications Administered   Medications   oxyCODONE (ROXICODONE) tablet 5 mg (5 mg Oral $Given 4/26/25 7087)       Procedures   Procedures     Discussion of Management   None    ED Course        Additional Documentation  None    Medical Decision Making / Diagnosis     CMS Diagnoses: None    MIPS       CT/MRI of the lumbar spine was obtained because of risk factors for fracture (minor trauma in elderly/osteoporosis).    YONI Zepeda is a 79 year old male who presents to the ED for evaluation of back pain.  See HPI as above for additional details.  Vitals and physical exam as above.  Differentials broad included  cauda equina, fracture, strain, disc pathology, kidney stone, pyelonephritis, shingles, amongst others.  No red flag signs or symptoms to suggest for cauda equina at this time. Given history of surgery and age, elected to pursue CT as above.  Fortunately this returned negative for acute bony abnormality as does x-ray of the pelvis.  Suspect strain versus disc pathology at this time.  No urinary symptoms to suggest for stone or pyelonephritis, and pain began immediately following fall down onto the knee.  Do not feel based upon mechanism that CT of head or C-spine or any other imaging is indicated at this time and patient is comfortable with this plan.  No rash.  Discussed with patient option for admission for pain control versus discharge to home with pain management.  Patient with strong preference for the latter.  Patient able to ambulate here with walker without difficulty and feels comfortable with discharge to home.  Short course of oxycodone for home to be used in addition to Tylenol.  Narcotic precautions discussed.  Advised patient follow-up with PCP versus his back surgeon with persistent symptoms.  Discussed low threshold to return with any new or worsening symptoms.  All questions answered.  Patient discharged home in stable condition.  Lower suspicion for remainder of differential at this time based upon history and exam.  Discussed reasons to return. All questions answered. Patient discharged to home in stable condition.    Disposition   The patient was discharged.     Diagnosis     ICD-10-CM    1. Fall, initial encounter  W19.XXXA       2. Acute right-sided low back pain without sciatica  M54.50            Discharge Medications   Discharge Medication List as of 4/26/2025  7:15 PM        START taking these medications    Details   oxyCODONE (ROXICODONE) 5 MG tablet Take 1 tablet (5 mg) by mouth every 6 hours as needed for severe pain., Disp-10 tablet, R-0, [facility]             This record was created  at least in part using electronic voice recognition software, so please excuse any typographical errors.         Moy Willoughby PA-C  04/26/25 1938

## 2025-04-27 NOTE — ED NOTES
Ambulation trial with walker successful, pt able to tolerate pain and maintain balance. Plan to discharge home and use walker at home as necessary.

## 2025-04-27 NOTE — DISCHARGE INSTRUCTIONS
Use Tylenol 1000mg every 8 hours for pain.  Use oxycodone for breakthrough pain. This is sedating. Do not drive after taking. Be very careful with ambulation to avoid any falls.  Use ice to area of pain for 20 minutes every hour for the next 1-2 days.  Follow up with primary doctor or orthopedics (or your back surgeon) with ongoing symptoms.  Return with severe worsening symptoms.

## 2025-05-06 ENCOUNTER — PATIENT OUTREACH (OUTPATIENT)
Dept: CARE COORDINATION | Facility: CLINIC | Age: 80
End: 2025-05-06
Payer: MEDICARE

## 2025-05-06 NOTE — PROGRESS NOTES
[facility]  Medicare ACO Reach Population - Proactive Outreach  Unable to Reach    Background: Patient outreach conducted proactively to support health maintenance initiatives within [facility]'s Medicare ACO Reach Population.     Outreach attempted x 1.  Left message on patient's voicemail briefly explaining this is a proactive outreach from [facility].. Patient encouraged to call the [facility] scheduling team at [phone] with any scheduling needs or questions, or they can conveniently schedule via Medical Envelope.    Plan:  Care Coordinator will try to reach patient again in 1-2 business days.    Amy Wheeler RN  [facility]

## 2025-05-13 NOTE — PROGRESS NOTES
[facility]  Medicare ACO Reach Population - Proactive Outreach  Unable to Reach    Background: Patient outreach conducted proactively to support health maintenance initiatives within [facility]'s Medicare ACO Reach Population.     Outreach attempted x 2.  Left message on patient's voicemail briefly explaining this is a proactive outreach from [facility].. Patient encouraged to call the [facility] scheduling team at [phone] with any scheduling needs or questions, or they can conveniently schedule via [employer].    Plan:  Care Coordinator will do no further outreaches at this time.    Amy Wheeler, ELIJAH  [facility]

## 2025-06-07 DIAGNOSIS — E11.9 TYPE 2 DIABETES, HBA1C GOAL < 8% (H): ICD-10-CM

## (undated) DEVICE — TOOL DISSECT MIDAS MR8 14CM MATCH HEAD 3MM MR8-14MH30

## (undated) DEVICE — LINEN FULL SHEET 5511

## (undated) DEVICE — PAD PROAXIS TABLE KIT SPK10182

## (undated) DEVICE — GLOVE PROTEXIS POWDER FREE SMT 7.5  2D72PT75X

## (undated) DEVICE — SOL NACL 0.9% IRRIG 1000ML BOTTLE 2F7124

## (undated) DEVICE — PACK SET-UP STD 9102

## (undated) DEVICE — MARKER SPHERES PASSIVE MEDT PACK 5 8801075

## (undated) DEVICE — DECANTER BAG 2002S

## (undated) DEVICE — PACK CYSTO CUSTOM RIDGES

## (undated) DEVICE — PREP TECHNI-CARE CHLOROXYLENOL 3% 4OZ BOTTLE C222-4ZWO

## (undated) DEVICE — PREP DURAPREP 26ML APL 8630

## (undated) DEVICE — ADH SKIN CLOSURE PREMIERPRO EXOFIN 1.0ML 3470

## (undated) DEVICE — CATH URETERAL FLEX TIP TIGERTAIL 06FRX70CM 139006

## (undated) DEVICE — SUCTION TIP YANKAUER W/O VENT K86

## (undated) DEVICE — DRAIN HEMOVAC RESERVOIR KIT 10FR 1/8" MED 00-2550-002-10

## (undated) DEVICE — DRAPE STERI TOWEL LG 1010

## (undated) DEVICE — DRSG AQUACEL AG HYDROFIBER  3.5X10" 422605

## (undated) DEVICE — DRAPE X-RAY TUBE 00-901169-01-OEC

## (undated) DEVICE — ESU PENCIL W/SMOKE EVAC CVPLP2000

## (undated) DEVICE — LINEN DRAPE 54X72" 5467

## (undated) DEVICE — SOL WATER IRRIG 1000ML BOTTLE 2F7114

## (undated) DEVICE — LINEN ORTHO ACL PACK 5447

## (undated) DEVICE — RAD RX ISOVUE 300 (50ML) 61% IOPAMIDOL CHARGE PER ML

## (undated) DEVICE — SU MONOCRYL 3-0 PS-2 27" Y427H

## (undated) DEVICE — SU STRATAFIX PDS PLUS 1 CT-1 12" SXPP1A443

## (undated) DEVICE — SOL WATER IRRIG 3000ML BAG 2B7117

## (undated) DEVICE — GLOVE BIOGEL PI MICRO INDICATOR UNDERGLOVE SZ 8.5 48985

## (undated) DEVICE — GLOVE BIOGEL PI MICRO SZ 8.0 48580

## (undated) DEVICE — SPONGE COTTONOID 1/2X1" 80-1402

## (undated) DEVICE — SPONGE SURGIFOAM 01GM POWDER 1978

## (undated) DEVICE — NDL BLUNT 18GA 1" W/O FILTER 305181

## (undated) DEVICE — SUCTION FRAZIER 12FR W/OBTURATOR 33120

## (undated) DEVICE — ENDO SNARE POLYPECTOMY OVAL 15MM LOOP SD-240U-15

## (undated) DEVICE — CATH TRAY FOLEY COUDE SURESTEP 16FR W/DRN BAG LATEX A304416A

## (undated) DEVICE — DRSG TEGADERM 4X4 3/4" 1626W

## (undated) DEVICE — SU ETHILON 2-0 PS 18" 585H

## (undated) DEVICE — CUSHION INSERT LG PRONE VIEW JACKSON TABLE

## (undated) DEVICE — CATH URETERAL CONE 08FR 70CM 138008LT / 138008RT

## (undated) DEVICE — DRAPE LAP W/ARMBOARD 29410

## (undated) DEVICE — ESU ELEC BLADE 2.75" COATED/INSULATED E1455

## (undated) DEVICE — SUCTION MANIFOLD NEPTUNE 2 SYS 4 PORT 0702-020-000

## (undated) DEVICE — COVER FOOTSWITCH W/CINCH 20X24" 923267

## (undated) DEVICE — LINEN POUCH DBL 5427

## (undated) DEVICE — BAG URINARY DRAIN 4000ML LF 153509

## (undated) DEVICE — SOL NACL 0.9% 10ML VIAL 0409-4888-02

## (undated) DEVICE — DEVICE DUST COLLECTOR BONE BOX S-3500

## (undated) DEVICE — GOWN IMPERVIOUS SPECIALTY XLG/XLONG 32474

## (undated) DEVICE — CATH IV ANGIO INTRO 12GA 382277

## (undated) DEVICE — GUIDEWIRE URO STR STIFF .035"X150CM NITINOL 150NSS35

## (undated) DEVICE — CATH HOLDER STRAP 36600

## (undated) DEVICE — TUBING SET IRRIGATION 4 LEAD 90" DYND19124

## (undated) DEVICE — LINEN HALF SHEET 5512

## (undated) DEVICE — Device

## (undated) DEVICE — PACK SMALL SPINE RIDGES

## (undated) DEVICE — KIT ENDO TURNOVER/PROCEDURE CARRY-ON 101822

## (undated) DEVICE — BLADE BONE MILL STRK 5.0MM MED 5400-701-000

## (undated) DEVICE — ESU GROUND PAD ADULT W/CORD E7507

## (undated) DEVICE — SU VICRYL 2-0 CT-1 27" UND J259H

## (undated) DEVICE — BAG CLEAR TRASH 1.3M 39X33" P4040C

## (undated) RX ORDER — ACETAMINOPHEN 325 MG/1
TABLET ORAL
Status: DISPENSED
Start: 2023-02-14

## (undated) RX ORDER — PROPOFOL 10 MG/ML
INJECTION, EMULSION INTRAVENOUS
Status: DISPENSED
Start: 2019-01-08

## (undated) RX ORDER — HYDROMORPHONE HYDROCHLORIDE 2 MG/1
TABLET ORAL
Status: DISPENSED
Start: 2023-02-14

## (undated) RX ORDER — PROPOFOL 10 MG/ML
INJECTION, EMULSION INTRAVENOUS
Status: DISPENSED
Start: 2023-02-14

## (undated) RX ORDER — DEXAMETHASONE SODIUM PHOSPHATE 4 MG/ML
INJECTION, SOLUTION INTRA-ARTICULAR; INTRALESIONAL; INTRAMUSCULAR; INTRAVENOUS; SOFT TISSUE
Status: DISPENSED
Start: 2023-02-14

## (undated) RX ORDER — FENTANYL CITRATE 50 UG/ML
INJECTION, SOLUTION INTRAMUSCULAR; INTRAVENOUS
Status: DISPENSED
Start: 2023-02-14

## (undated) RX ORDER — FENTANYL CITRATE 50 UG/ML
INJECTION, SOLUTION INTRAMUSCULAR; INTRAVENOUS
Status: DISPENSED
Start: 2019-01-08

## (undated) RX ORDER — BUPIVACAINE HYDROCHLORIDE AND EPINEPHRINE 2.5; 5 MG/ML; UG/ML
INJECTION, SOLUTION EPIDURAL; INFILTRATION; INTRACAUDAL; PERINEURAL
Status: DISPENSED
Start: 2023-02-14

## (undated) RX ORDER — GLYCOPYRROLATE 0.2 MG/ML
INJECTION INTRAMUSCULAR; INTRAVENOUS
Status: DISPENSED
Start: 2023-02-14

## (undated) RX ORDER — LIDOCAINE HYDROCHLORIDE 10 MG/ML
INJECTION, SOLUTION EPIDURAL; INFILTRATION; INTRACAUDAL; PERINEURAL
Status: DISPENSED
Start: 2023-02-14

## (undated) RX ORDER — METHOCARBAMOL 500 MG/1
TABLET, FILM COATED ORAL
Status: DISPENSED
Start: 2023-02-14

## (undated) RX ORDER — EPHEDRINE SULFATE 50 MG/ML
INJECTION, SOLUTION INTRAMUSCULAR; INTRAVENOUS; SUBCUTANEOUS
Status: DISPENSED
Start: 2023-02-14

## (undated) RX ORDER — HYDROXYZINE HYDROCHLORIDE 10 MG/1
TABLET, FILM COATED ORAL
Status: DISPENSED
Start: 2023-02-14

## (undated) RX ORDER — CEFAZOLIN SODIUM/WATER 2 G/20 ML
SYRINGE (ML) INTRAVENOUS
Status: DISPENSED
Start: 2023-02-14

## (undated) RX ORDER — CEFAZOLIN SODIUM 2 G/100ML
INJECTION, SOLUTION INTRAVENOUS
Status: DISPENSED
Start: 2019-01-08

## (undated) RX ORDER — ONDANSETRON 2 MG/ML
INJECTION INTRAMUSCULAR; INTRAVENOUS
Status: DISPENSED
Start: 2023-02-14

## (undated) RX ORDER — GLYCOPYRROLATE 0.2 MG/ML
INJECTION INTRAMUSCULAR; INTRAVENOUS
Status: DISPENSED
Start: 2019-01-08

## (undated) RX ORDER — DEXAMETHASONE SODIUM PHOSPHATE 4 MG/ML
INJECTION, SOLUTION INTRA-ARTICULAR; INTRALESIONAL; INTRAMUSCULAR; INTRAVENOUS; SOFT TISSUE
Status: DISPENSED
Start: 2019-01-08

## (undated) RX ORDER — ONDANSETRON 2 MG/ML
INJECTION INTRAMUSCULAR; INTRAVENOUS
Status: DISPENSED
Start: 2019-01-08

## (undated) RX ORDER — GABAPENTIN 100 MG/1
CAPSULE ORAL
Status: DISPENSED
Start: 2023-02-14

## (undated) RX ORDER — TRANEXAMIC ACID 10 MG/ML
INJECTION, SOLUTION INTRAVENOUS
Status: DISPENSED
Start: 2023-02-14

## (undated) RX ORDER — CEFAZOLIN SODIUM 1 G/3ML
INJECTION, POWDER, FOR SOLUTION INTRAMUSCULAR; INTRAVENOUS
Status: DISPENSED
Start: 2023-02-14

## (undated) RX ORDER — FENTANYL CITRATE 50 UG/ML
INJECTION, SOLUTION INTRAMUSCULAR; INTRAVENOUS
Status: DISPENSED
Start: 2017-03-02